# Patient Record
Sex: FEMALE | Race: ASIAN | NOT HISPANIC OR LATINO | Employment: UNEMPLOYED | URBAN - METROPOLITAN AREA
[De-identification: names, ages, dates, MRNs, and addresses within clinical notes are randomized per-mention and may not be internally consistent; named-entity substitution may affect disease eponyms.]

---

## 2023-04-08 ENCOUNTER — HOSPITAL ENCOUNTER (INPATIENT)
Facility: CLINIC | Age: 53
LOS: 6 days | Discharge: NURSING, REHAB OR LONG-TERM CARE | End: 2023-04-14
Attending: PSYCHIATRY & NEUROLOGY | Admitting: PSYCHIATRY & NEUROLOGY
Payer: COMMERCIAL

## 2023-04-08 ENCOUNTER — APPOINTMENT (OUTPATIENT)
Dept: CT IMAGING | Facility: HOSPITAL | Age: 53
End: 2023-04-08
Attending: EMERGENCY MEDICINE
Payer: COMMERCIAL

## 2023-04-08 ENCOUNTER — APPOINTMENT (OUTPATIENT)
Dept: CT IMAGING | Facility: CLINIC | Age: 53
End: 2023-04-08
Attending: PSYCHIATRY & NEUROLOGY
Payer: COMMERCIAL

## 2023-04-08 ENCOUNTER — APPOINTMENT (OUTPATIENT)
Dept: SPEECH THERAPY | Facility: CLINIC | Age: 53
End: 2023-04-08
Attending: STUDENT IN AN ORGANIZED HEALTH CARE EDUCATION/TRAINING PROGRAM
Payer: COMMERCIAL

## 2023-04-08 ENCOUNTER — HOSPITAL ENCOUNTER (EMERGENCY)
Facility: HOSPITAL | Age: 53
Discharge: ANOTHER HOSPITAL | End: 2023-04-08
Attending: EMERGENCY MEDICINE | Admitting: EMERGENCY MEDICINE
Payer: COMMERCIAL

## 2023-04-08 ENCOUNTER — APPOINTMENT (OUTPATIENT)
Dept: CT IMAGING | Facility: CLINIC | Age: 53
End: 2023-04-08
Attending: NURSE PRACTITIONER
Payer: COMMERCIAL

## 2023-04-08 ENCOUNTER — APPOINTMENT (OUTPATIENT)
Dept: OCCUPATIONAL THERAPY | Facility: CLINIC | Age: 53
End: 2023-04-08
Attending: STUDENT IN AN ORGANIZED HEALTH CARE EDUCATION/TRAINING PROGRAM
Payer: COMMERCIAL

## 2023-04-08 VITALS
DIASTOLIC BLOOD PRESSURE: 73 MMHG | BODY MASS INDEX: 22.04 KG/M2 | TEMPERATURE: 98.6 F | HEIGHT: 58 IN | SYSTOLIC BLOOD PRESSURE: 128 MMHG | RESPIRATION RATE: 19 BRPM | WEIGHT: 105 LBS | HEART RATE: 124 BPM | OXYGEN SATURATION: 97 %

## 2023-04-08 DIAGNOSIS — I10 HYPERTENSION, UNSPECIFIED TYPE: ICD-10-CM

## 2023-04-08 DIAGNOSIS — I62.9 INTRACRANIAL HEMORRHAGE (H): ICD-10-CM

## 2023-04-08 DIAGNOSIS — I61.9 HYPERTENSIVE INTRACEREBRAL HEMORRHAGE (H): ICD-10-CM

## 2023-04-08 DIAGNOSIS — I61.0 NONTRAUMATIC SUBCORTICAL HEMORRHAGE OF LEFT CEREBRAL HEMISPHERE (H): Primary | ICD-10-CM

## 2023-04-08 DIAGNOSIS — R53.1 ACUTE RIGHT-SIDED WEAKNESS: ICD-10-CM

## 2023-04-08 DIAGNOSIS — E78.5 HYPERLIPIDEMIA, UNSPECIFIED HYPERLIPIDEMIA TYPE: ICD-10-CM

## 2023-04-08 DIAGNOSIS — E87.6 HYPOKALEMIA: ICD-10-CM

## 2023-04-08 LAB
ABO/RH(D): NORMAL
ANION GAP SERPL CALCULATED.3IONS-SCNC: 12 MMOL/L (ref 7–15)
ANION GAP SERPL CALCULATED.3IONS-SCNC: 15 MMOL/L (ref 7–15)
ANTIBODY SCREEN: NEGATIVE
APTT PPP: 29 SECONDS (ref 22–38)
ATRIAL RATE - MUSE: 83 BPM
BASOPHILS # BLD AUTO: 0 10E3/UL (ref 0–0.2)
BASOPHILS NFR BLD AUTO: 1 %
BUN SERPL-MCNC: 4.6 MG/DL (ref 6–20)
BUN SERPL-MCNC: 6.6 MG/DL (ref 6–20)
CALCIUM SERPL-MCNC: 9.3 MG/DL (ref 8.6–10)
CALCIUM SERPL-MCNC: 9.4 MG/DL (ref 8.6–10)
CHLORIDE SERPL-SCNC: 103 MMOL/L (ref 98–107)
CHLORIDE SERPL-SCNC: 105 MMOL/L (ref 98–107)
CREAT SERPL-MCNC: 0.58 MG/DL (ref 0.51–0.95)
CREAT SERPL-MCNC: 0.68 MG/DL (ref 0.51–0.95)
DEPRECATED HCO3 PLAS-SCNC: 20 MMOL/L (ref 22–29)
DEPRECATED HCO3 PLAS-SCNC: 26 MMOL/L (ref 22–29)
DIASTOLIC BLOOD PRESSURE - MUSE: 118 MMHG
EOSINOPHIL # BLD AUTO: 0.2 10E3/UL (ref 0–0.7)
EOSINOPHIL NFR BLD AUTO: 4 %
ERYTHROCYTE [DISTWIDTH] IN BLOOD BY AUTOMATED COUNT: 16 % (ref 10–15)
GFR SERPL CREATININE-BSD FRML MDRD: >90 ML/MIN/1.73M2
GFR SERPL CREATININE-BSD FRML MDRD: >90 ML/MIN/1.73M2
GLUCOSE BLDC GLUCOMTR-MCNC: 109 MG/DL (ref 70–99)
GLUCOSE BLDC GLUCOMTR-MCNC: 115 MG/DL (ref 70–99)
GLUCOSE BLDC GLUCOMTR-MCNC: 116 MG/DL (ref 70–99)
GLUCOSE BLDC GLUCOMTR-MCNC: 155 MG/DL (ref 70–99)
GLUCOSE SERPL-MCNC: 111 MG/DL (ref 70–99)
GLUCOSE SERPL-MCNC: 213 MG/DL (ref 70–99)
HBA1C MFR BLD: 6.2 %
HCT VFR BLD AUTO: 36.9 % (ref 35–47)
HGB BLD-MCNC: 11.5 G/DL (ref 11.7–15.7)
IMM GRANULOCYTES # BLD: 0 10E3/UL
IMM GRANULOCYTES NFR BLD: 0 %
INR PPP: 0.99 (ref 0.85–1.15)
INTERPRETATION ECG - MUSE: NORMAL
LDLC SERPL DIRECT ASSAY-MCNC: 142 MG/DL
LYMPHOCYTES # BLD AUTO: 2.5 10E3/UL (ref 0.8–5.3)
LYMPHOCYTES NFR BLD AUTO: 37 %
MAGNESIUM SERPL-MCNC: 1.7 MG/DL (ref 1.7–2.3)
MCH RBC QN AUTO: 25.5 PG (ref 26.5–33)
MCHC RBC AUTO-ENTMCNC: 31.2 G/DL (ref 31.5–36.5)
MCV RBC AUTO: 82 FL (ref 78–100)
MONOCYTES # BLD AUTO: 0.6 10E3/UL (ref 0–1.3)
MONOCYTES NFR BLD AUTO: 8 %
NEUTROPHILS # BLD AUTO: 3.4 10E3/UL (ref 1.6–8.3)
NEUTROPHILS NFR BLD AUTO: 50 %
NRBC # BLD AUTO: 0 10E3/UL
NRBC BLD AUTO-RTO: 0 /100
P AXIS - MUSE: 44 DEGREES
PHOSPHATE SERPL-MCNC: 3.2 MG/DL (ref 2.5–4.5)
PLATELET # BLD AUTO: 287 10E3/UL (ref 150–450)
POTASSIUM SERPL-SCNC: 2.6 MMOL/L (ref 3.4–5.3)
POTASSIUM SERPL-SCNC: 2.7 MMOL/L (ref 3.4–5.3)
POTASSIUM SERPL-SCNC: 3.3 MMOL/L (ref 3.4–5.3)
PR INTERVAL - MUSE: 162 MS
QRS DURATION - MUSE: 84 MS
QT - MUSE: 390 MS
QTC - MUSE: 458 MS
R AXIS - MUSE: 23 DEGREES
RADIOLOGIST FLAGS: ABNORMAL
RBC # BLD AUTO: 4.51 10E6/UL (ref 3.8–5.2)
SODIUM SERPL-SCNC: 140 MMOL/L (ref 136–145)
SODIUM SERPL-SCNC: 141 MMOL/L (ref 136–145)
SPECIMEN EXPIRATION DATE: NORMAL
SYSTOLIC BLOOD PRESSURE - MUSE: 234 MMHG
T AXIS - MUSE: -87 DEGREES
TROPONIN T SERPL HS-MCNC: 10 NG/L
TROPONIN T SERPL HS-MCNC: 32 NG/L
TROPONIN T SERPL HS-MCNC: 40 NG/L
TROPONIN T SERPL HS-MCNC: 45 NG/L
TROPONIN T SERPL HS-MCNC: 50 NG/L
TSH SERPL DL<=0.005 MIU/L-ACNC: 3.45 UIU/ML (ref 0.3–4.2)
VENTRICULAR RATE- MUSE: 83 BPM
WBC # BLD AUTO: 6.7 10E3/UL (ref 4–11)

## 2023-04-08 PROCEDURE — 250N000011 HC RX IP 250 OP 636: Performed by: PSYCHIATRY & NEUROLOGY

## 2023-04-08 PROCEDURE — 99292 CRITICAL CARE ADDL 30 MIN: CPT | Performed by: PSYCHIATRY & NEUROLOGY

## 2023-04-08 PROCEDURE — 93005 ELECTROCARDIOGRAM TRACING: CPT | Performed by: EMERGENCY MEDICINE

## 2023-04-08 PROCEDURE — 80048 BASIC METABOLIC PNL TOTAL CA: CPT | Performed by: NURSE PRACTITIONER

## 2023-04-08 PROCEDURE — 97535 SELF CARE MNGMENT TRAINING: CPT | Mod: GO

## 2023-04-08 PROCEDURE — 92526 ORAL FUNCTION THERAPY: CPT | Mod: GN

## 2023-04-08 PROCEDURE — 85730 THROMBOPLASTIN TIME PARTIAL: CPT | Performed by: EMERGENCY MEDICINE

## 2023-04-08 PROCEDURE — 36415 COLL VENOUS BLD VENIPUNCTURE: CPT | Performed by: NURSE PRACTITIONER

## 2023-04-08 PROCEDURE — 36415 COLL VENOUS BLD VENIPUNCTURE: CPT | Performed by: EMERGENCY MEDICINE

## 2023-04-08 PROCEDURE — 84100 ASSAY OF PHOSPHORUS: CPT | Performed by: NURSE PRACTITIONER

## 2023-04-08 PROCEDURE — 84132 ASSAY OF SERUM POTASSIUM: CPT | Performed by: PSYCHIATRY & NEUROLOGY

## 2023-04-08 PROCEDURE — 97530 THERAPEUTIC ACTIVITIES: CPT | Mod: GO

## 2023-04-08 PROCEDURE — 82962 GLUCOSE BLOOD TEST: CPT

## 2023-04-08 PROCEDURE — 85610 PROTHROMBIN TIME: CPT | Performed by: EMERGENCY MEDICINE

## 2023-04-08 PROCEDURE — 93005 ELECTROCARDIOGRAM TRACING: CPT

## 2023-04-08 PROCEDURE — 83036 HEMOGLOBIN GLYCOSYLATED A1C: CPT | Performed by: NURSE PRACTITIONER

## 2023-04-08 PROCEDURE — 85025 COMPLETE CBC W/AUTO DIFF WBC: CPT | Performed by: EMERGENCY MEDICINE

## 2023-04-08 PROCEDURE — 96366 THER/PROPH/DIAG IV INF ADDON: CPT

## 2023-04-08 PROCEDURE — 250N000013 HC RX MED GY IP 250 OP 250 PS 637: Performed by: NURSE PRACTITIONER

## 2023-04-08 PROCEDURE — 70498 CT ANGIOGRAPHY NECK: CPT

## 2023-04-08 PROCEDURE — 250N000011 HC RX IP 250 OP 636: Performed by: NURSE PRACTITIONER

## 2023-04-08 PROCEDURE — 84484 ASSAY OF TROPONIN QUANT: CPT | Performed by: NURSE PRACTITIONER

## 2023-04-08 PROCEDURE — 83735 ASSAY OF MAGNESIUM: CPT | Performed by: NURSE PRACTITIONER

## 2023-04-08 PROCEDURE — 70496 CT ANGIOGRAPHY HEAD: CPT

## 2023-04-08 PROCEDURE — 250N000011 HC RX IP 250 OP 636: Performed by: EMERGENCY MEDICINE

## 2023-04-08 PROCEDURE — 258N000003 HC RX IP 258 OP 636: Performed by: STUDENT IN AN ORGANIZED HEALTH CARE EDUCATION/TRAINING PROGRAM

## 2023-04-08 PROCEDURE — 84443 ASSAY THYROID STIM HORMONE: CPT | Performed by: NURSE PRACTITIONER

## 2023-04-08 PROCEDURE — 70450 CT HEAD/BRAIN W/O DYE: CPT

## 2023-04-08 PROCEDURE — 250N000013 HC RX MED GY IP 250 OP 250 PS 637: Performed by: PSYCHIATRY & NEUROLOGY

## 2023-04-08 PROCEDURE — 200N000002 HC R&B ICU UMMC

## 2023-04-08 PROCEDURE — 80048 BASIC METABOLIC PNL TOTAL CA: CPT | Performed by: EMERGENCY MEDICINE

## 2023-04-08 PROCEDURE — 99291 CRITICAL CARE FIRST HOUR: CPT | Mod: 25

## 2023-04-08 PROCEDURE — 70450 CT HEAD/BRAIN W/O DYE: CPT | Mod: 26 | Performed by: RADIOLOGY

## 2023-04-08 PROCEDURE — 96365 THER/PROPH/DIAG IV INF INIT: CPT | Mod: 59

## 2023-04-08 PROCEDURE — 84484 ASSAY OF TROPONIN QUANT: CPT | Performed by: EMERGENCY MEDICINE

## 2023-04-08 PROCEDURE — 99291 CRITICAL CARE FIRST HOUR: CPT | Mod: FS | Performed by: NURSE PRACTITIONER

## 2023-04-08 PROCEDURE — 92610 EVALUATE SWALLOWING FUNCTION: CPT | Mod: GN

## 2023-04-08 PROCEDURE — 83721 ASSAY OF BLOOD LIPOPROTEIN: CPT | Performed by: NURSE PRACTITIONER

## 2023-04-08 PROCEDURE — 97167 OT EVAL HIGH COMPLEX 60 MIN: CPT | Mod: GO

## 2023-04-08 PROCEDURE — 86901 BLOOD TYPING SEROLOGIC RH(D): CPT | Performed by: EMERGENCY MEDICINE

## 2023-04-08 PROCEDURE — 36415 COLL VENOUS BLD VENIPUNCTURE: CPT | Performed by: PSYCHIATRY & NEUROLOGY

## 2023-04-08 PROCEDURE — 93010 ELECTROCARDIOGRAM REPORT: CPT | Mod: 59 | Performed by: INTERNAL MEDICINE

## 2023-04-08 PROCEDURE — 99292 CRITICAL CARE ADDL 30 MIN: CPT

## 2023-04-08 PROCEDURE — 70450 CT HEAD/BRAIN W/O DYE: CPT | Mod: 77

## 2023-04-08 RX ORDER — AMLODIPINE AND BENAZEPRIL HYDROCHLORIDE 10; 20 MG/1; MG/1
1 CAPSULE ORAL
Status: ON HOLD | COMMUNITY
End: 2023-04-13

## 2023-04-08 RX ORDER — ONDANSETRON 2 MG/ML
4 INJECTION INTRAMUSCULAR; INTRAVENOUS EVERY 6 HOURS PRN
Status: DISCONTINUED | OUTPATIENT
Start: 2023-04-08 | End: 2023-04-14 | Stop reason: HOSPADM

## 2023-04-08 RX ORDER — SODIUM CHLORIDE 9 MG/ML
INJECTION, SOLUTION INTRAVENOUS CONTINUOUS
Status: DISCONTINUED | OUTPATIENT
Start: 2023-04-08 | End: 2023-04-09

## 2023-04-08 RX ORDER — IOPAMIDOL 755 MG/ML
75 INJECTION, SOLUTION INTRAVASCULAR ONCE
Status: COMPLETED | OUTPATIENT
Start: 2023-04-08 | End: 2023-04-08

## 2023-04-08 RX ORDER — AMOXICILLIN 250 MG
2 CAPSULE ORAL EVERY EVENING
Status: DISCONTINUED | OUTPATIENT
Start: 2023-04-08 | End: 2023-04-14 | Stop reason: HOSPADM

## 2023-04-08 RX ORDER — POTASSIUM CHLORIDE 7.45 MG/ML
10 INJECTION INTRAVENOUS
Status: COMPLETED | OUTPATIENT
Start: 2023-04-08 | End: 2023-04-08

## 2023-04-08 RX ORDER — POTASSIUM CHLORIDE 7.45 MG/ML
10 INJECTION INTRAVENOUS
Status: COMPLETED | OUTPATIENT
Start: 2023-04-08 | End: 2023-04-09

## 2023-04-08 RX ORDER — NICOTINE POLACRILEX 4 MG
15-30 LOZENGE BUCCAL
Status: DISCONTINUED | OUTPATIENT
Start: 2023-04-08 | End: 2023-04-14 | Stop reason: HOSPADM

## 2023-04-08 RX ORDER — ACETAMINOPHEN 650 MG/1
650 SUPPOSITORY RECTAL EVERY 4 HOURS PRN
Status: DISCONTINUED | OUTPATIENT
Start: 2023-04-08 | End: 2023-04-14 | Stop reason: HOSPADM

## 2023-04-08 RX ORDER — POLYETHYLENE GLYCOL 3350 17 G/17G
17 POWDER, FOR SOLUTION ORAL DAILY
Status: DISCONTINUED | OUTPATIENT
Start: 2023-04-08 | End: 2023-04-14 | Stop reason: HOSPADM

## 2023-04-08 RX ORDER — HYDRALAZINE HYDROCHLORIDE 20 MG/ML
10 INJECTION INTRAMUSCULAR; INTRAVENOUS EVERY 4 HOURS PRN
Status: DISCONTINUED | OUTPATIENT
Start: 2023-04-08 | End: 2023-04-14 | Stop reason: HOSPADM

## 2023-04-08 RX ORDER — ACETAMINOPHEN 325 MG/1
650 TABLET ORAL EVERY 4 HOURS PRN
Status: DISCONTINUED | OUTPATIENT
Start: 2023-04-08 | End: 2023-04-14 | Stop reason: HOSPADM

## 2023-04-08 RX ORDER — LABETALOL HYDROCHLORIDE 5 MG/ML
10 INJECTION, SOLUTION INTRAVENOUS
Status: DISCONTINUED | OUTPATIENT
Start: 2023-04-08 | End: 2023-04-14 | Stop reason: HOSPADM

## 2023-04-08 RX ORDER — POLYETHYLENE GLYCOL 3350 17 G/17G
17 POWDER, FOR SOLUTION ORAL DAILY
Status: DISCONTINUED | OUTPATIENT
Start: 2023-04-08 | End: 2023-04-08

## 2023-04-08 RX ORDER — POTASSIUM CHLORIDE 7.45 MG/ML
10 INJECTION INTRAVENOUS ONCE
Status: COMPLETED | OUTPATIENT
Start: 2023-04-08 | End: 2023-04-08

## 2023-04-08 RX ORDER — POTASSIUM CHLORIDE 1.5 G/1.58G
40 POWDER, FOR SOLUTION ORAL ONCE
Status: COMPLETED | OUTPATIENT
Start: 2023-04-08 | End: 2023-04-08

## 2023-04-08 RX ORDER — DEXTROSE MONOHYDRATE 25 G/50ML
25-50 INJECTION, SOLUTION INTRAVENOUS
Status: DISCONTINUED | OUTPATIENT
Start: 2023-04-08 | End: 2023-04-14 | Stop reason: HOSPADM

## 2023-04-08 RX ORDER — ACETAMINOPHEN 325 MG/10.15ML
650 LIQUID ORAL EVERY 4 HOURS PRN
Status: DISCONTINUED | OUTPATIENT
Start: 2023-04-08 | End: 2023-04-08

## 2023-04-08 RX ORDER — AMOXICILLIN 250 MG
2 CAPSULE ORAL AT BEDTIME
Status: DISCONTINUED | OUTPATIENT
Start: 2023-04-08 | End: 2023-04-08

## 2023-04-08 RX ADMIN — POTASSIUM CHLORIDE 10 MEQ: 7.46 INJECTION, SOLUTION INTRAVENOUS at 01:30

## 2023-04-08 RX ADMIN — POTASSIUM CHLORIDE 40 MEQ: 1.5 POWDER, FOR SOLUTION ORAL at 12:54

## 2023-04-08 RX ADMIN — NICARDIPINE HYDROCHLORIDE 2.5 MG/HR: 0.2 INJECTION, SOLUTION INTRAVENOUS at 00:44

## 2023-04-08 RX ADMIN — POTASSIUM CHLORIDE 10 MEQ: 7.46 INJECTION, SOLUTION INTRAVENOUS at 16:05

## 2023-04-08 RX ADMIN — NICARDIPINE HYDROCHLORIDE 7.5 MG/HR: 0.2 INJECTION, SOLUTION INTRAVENOUS at 05:29

## 2023-04-08 RX ADMIN — IOPAMIDOL 75 ML: 755 INJECTION, SOLUTION INTRAVENOUS at 00:34

## 2023-04-08 RX ADMIN — POTASSIUM CHLORIDE 10 MEQ: 7.46 INJECTION, SOLUTION INTRAVENOUS at 12:54

## 2023-04-08 RX ADMIN — POTASSIUM CHLORIDE 10 MEQ: 7.46 INJECTION, SOLUTION INTRAVENOUS at 10:56

## 2023-04-08 RX ADMIN — POTASSIUM CHLORIDE 10 MEQ: 7.46 INJECTION, SOLUTION INTRAVENOUS at 23:11

## 2023-04-08 RX ADMIN — POTASSIUM CHLORIDE 10 MEQ: 7.46 INJECTION, SOLUTION INTRAVENOUS at 15:23

## 2023-04-08 RX ADMIN — SODIUM CHLORIDE: 9 INJECTION, SOLUTION INTRAVENOUS at 07:04

## 2023-04-08 RX ADMIN — LABETALOL HYDROCHLORIDE 10 MG: 5 INJECTION, SOLUTION INTRAVENOUS at 23:09

## 2023-04-08 RX ADMIN — POLYETHYLENE GLYCOL 3350 17 G: 17 POWDER, FOR SOLUTION ORAL at 12:54

## 2023-04-08 RX ADMIN — POTASSIUM CHLORIDE 10 MEQ: 7.46 INJECTION, SOLUTION INTRAVENOUS at 14:05

## 2023-04-08 RX ADMIN — POTASSIUM CHLORIDE 10 MEQ: 7.46 INJECTION, SOLUTION INTRAVENOUS at 22:35

## 2023-04-08 RX ADMIN — POTASSIUM CHLORIDE 10 MEQ: 7.46 INJECTION, SOLUTION INTRAVENOUS at 12:05

## 2023-04-08 ASSESSMENT — ACTIVITIES OF DAILY LIVING (ADL)
ADLS_ACUITY_SCORE: 41
ADLS_ACUITY_SCORE: 35
ADLS_ACUITY_SCORE: 41
ADLS_ACUITY_SCORE: 35
ADLS_ACUITY_SCORE: 41
ADLS_ACUITY_SCORE: 35
PREVIOUS_RESPONSIBILITIES: MEAL PREP;HOUSEKEEPING;LAUNDRY;SHOPPING;MEDICATION MANAGEMENT;DRIVING
ADLS_ACUITY_SCORE: 35
ADLS_ACUITY_SCORE: 41
ADLS_ACUITY_SCORE: 35
ADLS_ACUITY_SCORE: 35

## 2023-04-08 ASSESSMENT — VISUAL ACUITY
OU: NORMAL ACUITY

## 2023-04-08 ASSESSMENT — ENCOUNTER SYMPTOMS
FACIAL ASYMMETRY: 1
WEAKNESS: 1

## 2023-04-08 NOTE — LETTER
[facility] [address]  [address] [facility] [address]  Phone: [phone]    April 13, 2023        Name of Family Member:          To whom it may concern:    Please excuse ______________ from their professional duties from the dates of ____________, as they were an integral part in aiding in their family member's care whilst hospitalized.  They helped coordinate with translation and daily examinations with the patient under my care.       Please contact me for questions or concerns.       Sincerely,          Adryan Reed, DO  Resident Physician  Department of Neurology  [facility], [place]  [phone]

## 2023-04-08 NOTE — H&P
Neurocritical Care History & Physical    Reason for critical care admission: ICH  Admitting Team: TOD  Date of Service:  04/08/2023  Date of Admission:  4/8/2023  Hospital Day: 1    Assessment/Plan  Ms. Mercy Osmna is a 52 year old woman with a past medical history of HTN admitted on 04/08/23 for spontaneous ICH, likely hypertensive in etiology. She presented to [facility] after she fell due to sudden onset of right-sided weakness.      She was deemed suitable for transfer to [facility] for further management.     Neuro  #Spontaneous left thalamic intracranial hemorrhage, likely secondary to uncontrolled hypertension   #Vasogenic cerebral edema secondary to ICH  - ICH Score at admission: 0  - Hold all antiplatelet and anticoagulant medications  - Repeat head CT now   - Admit to Neuro ICU  - Neurochecks per protocol  - Systolic BP Goal: < 160  - Labetalol and hydralazine PRN available   - Avoid hypotonic IV fluids  - Head of bed elevated  - PT/OT/SLP when appropriate      #Analgesics & sedation  - acetaminophen prn     CV  #HTN  - Cardiac monitoring  - SBP goal < 160 mmHg   - Labetalol and hydralazine available   - Hold PTA amlodipine-benazepril  - EKG, serial troponin  - Echocardiogram      Resp  #DELON  Oxygen/vent: none  - Continuous pulse ox  - Maintain O2 saturations greater than 92%     GI  #DELON  Diet: pending swallow screen, NPO  GI prophylaxis: not indicated  - Bowel regimen: scheduled senna-docusate and Miralax     Renal/  #Hypokalemia  - Daily BMP  - IV fluids: NS 75cc/hr  - Electrolyte replacement protocol     Endo  #Pre-diabetes   - Hgb A1c 6.2% 4/8/2023   - TSH pending   - Monitor glucose levels     Heme  #Anemia, chronic   - Daily CBC  - Hgb goal >7, plt goal >50k  - Transfuse to meet Hgb and plt goals     ID  #DELON  - Daily CBC  - Follow temperature curve     ICU Check List  Lines/tubes/drains: PIV  FEN: NS 75 ml/hour, electrolyte protocols, NPO for now   PPX: DVT - SCDs (pharm contraindicated due to  bleed); GI - not indicated .  Code: Full Code   Dispo: ICU - NCC    TIME SPENT ON THIS ENCOUNTER  I spent 70 minutes of critical care time on the unit/floor managing the care of Mercy Osman excluding time performing procedures. Upon evaluation, this patient had a high probability of imminent or life-threatening deterioration due to ICH, which required my direct attention, intervention, and personal management. Greater than 50% of my time was spent at the bedside counseling the patient and/or coordinating care including chart review, history, exam, documentation, and further activities per this note. I have personally reviewed the following data/imaging over the past 24 hours.     The patient was seen and discussed with the NCC attending, Dr. Javid Silva.    Jaelyn Ingram, APRN, CNP  Neurocritical Care *[id number]    History of Present Illness:  Ms. Mercy Osman is a 52 year old woman with a past medical history of HTN (on amlodipine-benazepril 10-20), history of prior Bell's Palsy admitted on 04/08/23 as a transfer from [facility] for intracranial hemorrhage.     Per chart review she presented 4/7/23 to [facility] ED via EMS after noted right sided weakness and facial droop. Last known well was 4/7 at 23:47. Per EHR review Ms. Osman was walking downstairs and fell due to sudden onset of right arm and leg weakness. She did not have obvious head trauma and she is not currently on anticoagulation. Stroke code activated upon arrival (see fellow note for full details). She was found to have a left basal ganglia hemorrhage. BP on arrival  To /115. Noted to be hypokalemic to 2.6, repleted. Started on a nicardipine gtt.     She was subsequently transferred to [facility].    No Known Allergies    Current Medications:    polyethylene glycol  17 g Oral or Feeding Tube Daily     senna-docusate  2 tablet Oral or Feeding Tube QPM       PRN Medications:  acetaminophen **OR** [DISCONTINUED] acetaminophen **OR**  acetaminophen, hydrALAZINE, labetalol, ondansetron, prochlorperazine    Infusions:    sodium chloride 75 mL/hr at 04/08/23 0704       Physical Examination:  Vitals: /86   Pulse 111   Temp 97.5  F (36.4  C) (Oral)   Resp 23   Wt 52.9 kg (116 lb 10 oz)   SpO2 95%   BMI 24.37 kg/m    General: Adult female patient, lying in bed, critically-ill.  HEENT: Normocephalic, atraumatic.  Cardiac: RRR, no obvious murmur. She appears adequately perfused.   Pulm: She is not hypoxic, no tachypnea.  Abdomen: Soft, non-distended.  Extremities: Warm, no edema, distal extremities appear adequately perfused.   Skin: No obvious rash or lesion on exposed skin.   Psych: Calm and cooperative.  Neuro: Exam is limited by language- family is present and willing to serve as .   Mental status: Awake, alert, attentive, speech not always understandable per family. Follows commands when commands are asked by family in native language.   Cranial nerves: PERRL, conjugate gaze, EOMI, right lower facial droop, unable to shrug right shoulder.    Motor: Normal bulk and tone. No abnormal movements. Right upper extremity no movement, right lower extremity weak but does not hit bed.   Sensory: Appears to be intact.   Coordination: Deferred.    Gait: Deferred.    ICH Score (at arrival)  Scoring Tool Score   Age ? 80 years 1 point No   GCS score  3-4   5-12   13-15   2 point  1 point  0 point GCS 13-15   Hematoma volume, cm 3 ? 30 1 point No   Intraventricular extension 1 point No   Infratentorial location 1 point No   Total 0     Labs and Imaging:    Results for orders placed or performed during the hospital encounter of 04/08/23 (from the past 24 hour(s))   EKG 12-lead   Result Value Ref Range    Systolic Blood Pressure  mmHg    Diastolic Blood Pressure  mmHg    Ventricular Rate 113 BPM    Atrial Rate 113 BPM    SD Interval 142 ms    QRS Duration 88 ms     ms    QTc 496 ms    P Axis 17 degrees    R AXIS -4 degrees    T Axis -50  degrees    Interpretation ECG       Sinus tachycardia  Left ventricular hypertrophy with repolarization abnormality ( R in aVL , Sokolow-Jackson , [place] product )  Abnormal ECG  When compared with ECG of 08-APR-2023 00:35, (unconfirmed)  Nonspecific T wave abnormality, improved in Anterolateral leads     EKG 12-lead, complete   Result Value Ref Range    Systolic Blood Pressure  mmHg    Diastolic Blood Pressure  mmHg    Ventricular Rate 83 BPM    Atrial Rate 83 BPM    NV Interval 168 ms    QRS Duration 90 ms     ms    QTc 526 ms    P Axis 44 degrees    R AXIS 3 degrees    T Axis 105 degrees    Interpretation ECG       Sinus rhythm with sinus arrhythmia  Voltage criteria for left ventricular hypertrophy ( R in aVL , Sokolow-Jackson , [place] product )  ST & T wave abnormality, consider lateral ischemia  Prolonged QT  Abnormal ECG  When compared with ECG of 08-APR-2023 07:01, (unconfirmed)  Nonspecific T wave abnormality has replaced inverted T waves in Inferior leads     Hemoglobin A1c   Result Value Ref Range    Hemoglobin A1C 6.2 (H) <5.7 %       All relevant imaging and laboratory values personally reviewed.

## 2023-04-08 NOTE — PROGRESS NOTES
Neurosurgery Treatment Plan:   Reviewed patients chart  Right sided weakness and slurred speech that began a hour ago   Blood pressure on arrival 227/115 not on blood thinners   Stroke neurology called      Images:   IMPRESSION:   HEAD CT:  1. 2.7 x 1.4 x 2.5 cm acute hemorrhage in the left thalamus/left internal capsule with involvement of the left basal ganglia. No intraventricular extension. This is typical of a hypertensive bleed.  2. Slight left to right bowing of the midline structures.      HEAD CTA: No stenosis/occlusion, aneurysm, or high flow vascular malformation.     NECK CTA:  No measurable stenosis or dissection.     Plan:   Needs emergent transfer to [facility] to neuro ICU pending bed availability if no beds will need to transfer out of system unable to stay at [facility]   No neurosurgical intervention presently indicated  Systolic BP <150   HOB> 30 degrees  Hold all anticoagulants   Repeat head CT in 6 hours      Shae Larson PA-C  [facility] Neurosurgery  O: [phone]

## 2023-04-08 NOTE — PLAN OF CARE
Major Shift Events:  Q2 neuros. RUE only withdraws to pain, no spontaneous movement. RLE will have whole leg movement but limited plantar and dorsal flexion. LUE and LLE follow commands and have +5 strength. Was able to stand and pivot to chair with heavy assist of 2. AxO X4 via family as interpretors. MRI completed.  Cleared for puree diet and thin liquids by speech.  Large amounts of urine, 2 Ls measured and full briefs of unmeasured urine. 6 bags of 10 meq potassium given in replacement along with 40 meqs orally.   Plan: Continue neuros, track bleed  For vital signs and complete assessments, please see documentation flowsheets.

## 2023-04-08 NOTE — PLAN OF CARE
Admitted/transferred from: [facility] ED   Reason for admission/transfer: NC consult   2 RN skin assessment: completed by LILY Sesay and LILY Ayala  Result of skin assessment and interventions/actions: No intervention required   Height, weight, drug calc weight: Done   Patient belongings: None, came with son   MDRO education added to care plan: N/A  ?

## 2023-04-08 NOTE — CONSULTS
[facility]    Stroke Telephone Note    I was called by Zen Wills on 04/08/23 regarding patient Mercy Osman. The patient is a 52 year old female who presents with   PMhx of HTN went to the bathroom and came back down the stairs and had right sided weakness and face droop. She fell against the wall without any head trauma.    LKN: 2347  Stroke code was paged out when patient arrived 30min after symptom onset, stroke code was received at 0042    Stroke Code Data (for stroke code without tele)  Stroke code activated 04/08/23   0042   Stroke provider first response  04/08/23   0044            Last known normal 04/08/23   2347        Time of discovery   (or onset of symptoms) 04/08/23   2347   Head CT read by Stroke Neuro Dr/Provider 04/08/23   0048   Was stroke code de-escalated? No              Imaging Findings   IMPRESSION:   HEAD CT:     1. 2.7 x 1.4 x 2.5 cm acute hemorrhage in the left thalamus/left internal capsule with involvement of the left basal ganglia. No intraventricular extension. This is typical of a hypertensive bleed.  2. Slight left to right bowing of the midline structures.      HEAD CTA: No stenosis/occlusion, aneurysm, or high flow vascular malformation.     NECK CTA:  No measurable stenosis or dissection.       Intravenous Thrombolysis  Not given due to:   - active bleeding    Endovascular Treatment  Not initiated due to absence of proximal vessel occlusion    Impression  Non-traumatic intracerebral hemorrhage of Left thalamus/ IC- who presents with right sided weakness that developed suddenly. She presents with BP in the 230s/118s consistent with a hypertensive etiology of her bleed. CTA showed no evidence of aneurysm or vascular malformation.     Recommendations   Acute Hemorrhagic Stroke Recommendations  - Neurochecks and Vital Signs every Q1H  - Systolic BP Goal: 140-160  - Don't drop BP by 20% in the first hour  - Labs coags: PT/INR  - Head of bed  "elevated  - Telemetry, EKG  - Imaging: repeat head CT in 6 hours  - Bedside Glucose Monitoring  - A1c, Troponin x 3  - PT/OT/SLP  - Stroke Education  - Euthermia, Euglycemia  - NSYG consult   - Transfer to [facility] or [facility]  - MRI brain WO    My recommendations are based on the information provided over the phone by Mercy Osman's in-person providers. They are not intended to replace the clinical judgment of her in-person providers. I was not requested to personally see or examine the patient at this time.    The Stroke Staff is Dr. Diaz  .    Belle Buchanan MD  Vascular Neurology Fellow    To page me or covering stroke neurology team member, click here: AMCOM  Choose \"On Call\" tab at top, then select \"NEUROLOGY/ALL SITES\" from middle drop-down box, press Enter, then look for \"stroke\" or \"telestroke\" for your site.          "

## 2023-04-08 NOTE — PROGRESS NOTES
04/08/23 1124   Appointment Info   Signing Clinician's Name / Credentials (SLP) Nadia Forrester MA CCC-SLP   General Information   Onset of Illness/Injury or Date of Surgery 04/08/23   Referring Physician Julisa Barker MD   Patient/Family Therapy Goal Statement (SLP) None stated   Pertinent History of Current Problem Pt is a 52 year old woman with a past medical history of HTN admitted on 04/08/23 for spontaneous ICH, likely hypertensive in etiology. She presented to [facility] after she fell due to sudden onset of right-sided weakness. Clinical swallow eval completed per MD order.   General Observations Upon SLP arrival, pt positioned upright in the chair, alert, and willing to participate. Noted significant communication deficits. Pt's family present.       Present yes  (Family)   Language Hmong   Type of Evaluation   Type of Evaluation Swallow Evaluation   Oral Motor   Oral Musculature anomalies present   Structural Abnormalities none present   Mucosal Quality dry   Dentition (Oral Motor)   Dentition (Oral Motor) some missing teeth   Facial Symmetry (Oral Motor)   Facial Symmetry (Oral Motor) right side impairment   Right Side Facial Asymmetry moderate impairment   Lip Function (Oral Motor)   Lip Range of Motion (Oral Motor) protrusion impairment;retraction impairment   Protrusion, Lip Range of Motion right side;moderate impairment   Retraction, Lip Range of Motion right side;moderate impairment   Tongue Function (Oral Motor)   Tongue ROM (Oral Motor) WNL   Jaw Function (Oral Motor)   Jaw Function (Oral Motor) WNL   Cough/Swallow/Gag Reflex (Oral Motor)   Volitional Throat Clear/Cough (Oral Motor) unable/difficult to assess   Volitional Swallow (Oral Motor) mildly delayed   Vocal Quality/Secretion Management (Oral Motor)   Vocal Quality (Oral Motor) WFL   Secretion Management (Oral Motor) WNL   General Swallowing Observations   Past History of Dysphagia No hx of dysphagia per  chart review and family report.   Respiratory Support (General Swallowing Observations) none   Current Diet/Method of Nutritional Intake (General Swallowing Observations, NIS) NPO   Swallowing Evaluation Clinical swallow evaluation   Clinical Swallow Evaluation   Feeding Assistance frequent cues/help required   Clinical Swallow Evaluation Textures Trialed thin liquids;pureed   Clinical Swallow Eval: Thin Liquid Texture Trial   Mode of Presentation, Thin Liquids cup;spoon;straw;fed by clinician   Volume of Liquid or Food Presented 4 oz   Oral Phase of Swallow WFL   Pharyngeal Phase of Swallow intact   Diagnostic Statement No overt s/sx of aspiration evident.   Clinical Swallow Evaluation: Puree Solid Texture Trial   Mode of Presentation, Puree spoon;fed by clinician   Volume of Puree Presented 2 oz   Oral Phase, Puree WFL   Pharyngeal Phase, Puree intact   Diagnostic Statement No overt s/sx of aspiration evident.   Esophageal Phase of Swallow   Patient reports or presents with symptoms of esophageal dysphagia No   Swallowing Recommendations   Diet Consistency Recommendations thin liquids (level 0);pureed (level 4)   Supervision Level for Intake 1:1 supervision needed   Mode of Delivery Recommendations bolus size, small   Swallowing Maneuver Recommendations alternate food and liquid intake   Monitoring/Assistance Required (Eating/Swallowing) stop eating activities when fatigue is present;monitor for cough or change in vocal quality with intake   Recommended Feeding/Eating Techniques (Swallow Eval) maintain upright sitting position for eating;provide assist with feeding;minimize distractions during oral intake   Medication Administration Recommendations, Swallowing (SLP) Crushed in puree   Instrumental Assessment Recommendations instrumental evaluation not recommended at this time   General Therapy Interventions   Planned Therapy Interventions Dysphagia Treatment   Dysphagia treatment Oropharyngeal exercise  training;Modified diet education;Instruction of safe swallow strategies;Compensatory strategies for swallowing   Clinical Impression   Criteria for Skilled Therapeutic Interventions Met (SLP Eval) Yes, treatment indicated   SLP Diagnosis Mild oropharyngeal dysphagia   Problem List (SLP) Below baseline swallow mechanism   Risks & Benefits of therapy have been explained evaluation/treatment results reviewed;care plan/treatment goals reviewed;risks/benefits reviewed;current/potential barriers reviewed;participants voiced agreement with care plan;participants included;patient   Clinical Impression Comments Clinical swallow eval completed per MD order. Pt presents w/ mild oropharyngeal dysphagia in setting of ICH. Oral mech exam remarkable for R sided facial and labial weakness and some missing teeth. Mod-severe dysarthria noted. Pt assessed w/ thin liquids via tsp, cup, and straw and puree. Oral phase adequate. Pharyngeal phase unremarkable, w/ no overt s/sx of aspiration. Pt required frequent models, cues, and assiatnce to follow safe swallowing during eval. Recommend puree diet (IDDSI 4) and thin liquids w/ 1:1 supervision. Meds OK crushed in puree. Ensure pt is fully upright and alert, taking small sips/bites at a slow rate. SLP to follow.   SLP Total Evaluation Time   Eval: oral/pharyngeal swallow function, clinical swallow Minutes (46620) 17   SLP Discharge Planning   SLP Plan Diet tolerance/upgrade   SLP Discharge Recommendation Transitional Care Facility;Acute Rehab Center-Motivated patient will benefit from intensive, interdisciplinary therapy.  Anticipate will be able to tolerate 3 hours of therapy per day   SLP Rationale for DC Rec Dysphagia, below baseline communication   SLP Brief overview of current status  Recommend puree diet (IDDSI 4) and thin liquids w/ 1:1 supervision. Meds OK crushed in puree. Ensure pt is fully upright and alert, taking small sips/bites at a slow rate. SLP to follow.   Total Session  Time   Total Session Time (sum of timed and untimed services) 25

## 2023-04-08 NOTE — ED NOTES
Bed: JNED-01  Expected date: 4/8/23  Expected time: 12:09 AM  Means of arrival: Ambulance  Comments:  Lisa thomas

## 2023-04-08 NOTE — ED TRIAGE NOTES
Pt came via EMS and went to CT upon arrival. Pt went to [facility] normally and came out with having stroke symptoms such as right sided weakness, garbled speech according to pt's son. Symptoms occurred 20 minutes before Ems got there. Pt's son at bedside to translate.

## 2023-04-08 NOTE — ED PROVIDER NOTES
NAME: Mercy Osman  AGE: 52 year old female  YOB: 1970  MRN: [id number]  EVALUATION DATE & TIME: No admission date for patient encounter.    PCP: Ara Osman    ED PROVIDER: Zne Wills M.D.    Chief Complaint   Patient presents with     Stroke Symptoms     FINAL IMPRESSION:  1. Hypertensive intracerebral hemorrhage (H)    2. Intracranial hemorrhage (H)    3. Acute right-sided weakness    4. Hypokalemia      MEDICAL DECISION MAKIN:12 AM I met with the patient, obtained history, performed an initial exam, and discussed options and plan for diagnostics and treatment here in the ED. Tier I stroke code called.   12:32 AM Spoke with [facility]. Patient with head bleed.   12:44 AM Spoke with Dr. Belle Buchanan, Stroke Neurology, about patient.   12:52 AM Spoke with Shae Larson PA-C, Neurosurgery, about patient.   1:32 AM Spoke with Dr. Silva, [facility] hospitalist, who agrees to accept patient from admission to neuro critical care.   2:02 AM patient's family updated and recheck of patient with improvement in movement of right upper extremity.    Patient was clinically assessed and consented to treatment. After assessment, medical decision making and workup were discussed with the patient. The patient was agreeable to plan for testing, workup, and treatment.  Pertinent Labs & Imaging studies reviewed. (See chart for details)       Medical Decision Making    History:    Supplemental history from: EMS    External Record(s) reviewed: Documented in chart, if applicable.    Work Up:    Chart documentation includes differential considered and any EKGs or imaging independently interpreted by provider, where specified.    In additional to work up documented, I considered the following work up: Documented in chart, if applicable.    External consultation:    Discussion of management with another provider: Radiology (regarding imaging) and Other: Stroke Neurology, Neuro  Intensivist    Complicating factors:    Care impacted by chronic illness: Hypertension and Other: Bell's Palsy    Care affected by social determinants of health: Other: Language Barrier    Disposition considerations: Admit.    Mercy Osman is a 52 year old female who presents with stroke symptoms.   Differential diagnosis includes but not limited to CVA, TIA, Leonard's paralysis, intracranial hemorrhage, hypertensive emergency.  Patient with right-sided deficit that started 30 minutes prior to arrival.  Patient does have significant drift and weakness on the right side in the upper and lower extremity as well as right facial droop.  Speech is dysarthric.  Stroke code activated on arrival and patient sent straight to CT scan.  Radiology called back and CT scan did show a left thalamic intraparenchymal hemorrhage.  Likely hypertensive related.  Patient blood pressure still in the 200s and radiology will consider CTA but likely hypertensive intracranial hemorrhage.  Nicardipine ordered to help with blood pressure control and I spoke with stroke neurology.  Given the bleed stroke neurology recommended patient be transferred to either [facility] or the [facility] where neurosurgery had intracranial capabilities.  I did also speak with neurosurgery who made similar recommendation for transfer, although they did not feel patient would emergently need any neurosurgical intervention.  They recommended blood pressure control and will try to find ICU bed at either of those facilities for admission.  Blood pressure control still ongoing with nicardipine and adjusting rate to reduce blood pressure below 160.  Blood pressure maintaining below 160 systolically at 7.5, nicardipine.  Neurochecks continuing and patient was accepted for ICU admission at [facility] with Dr. Silva.  We will continue nicardipine for blood pressure control and monitor neuro exam.  On rechecks patient's  neuro exam did improve slightly with some right-sided droop on the right side of her face as well as improvement in some proximal muscle movement of the right upper extremity.  We will continue close neuro exams and monitoring with plan to transfer to [place] as soon as transport is available.    Critical Care     Performed by: Dr Dmitry Wills  Authorized by: Dr Dmitry Wills  Total critical care time: 108 minutes  Critical care was necessary to treat or prevent imminent or life-threatening deterioration of the following conditions:  Right-sided weakness, hypertensive emergency, intracranial hemorrhage.  Critical care was time spent personally by me on the following activities: development of treatment plan with patient or surrogate, discussions with consultants, examination of patient, evaluation of patient's response to treatment, obtaining history from patient or surrogate, ordering and performing treatments and interventions, ordering and review of laboratory studies, ordering and review of radiographic studies, re-evaluation of patient's condition and monitoring for potential decompensation.  Critical care time was exclusive of separately billable procedures and treating other patients.    MEDICATIONS GIVEN IN THE EMERGENCY:  Medications   niCARdipine 40 mg in 200 mL NS (CARDENE) infusion (7.5 mg/hr Intravenous Rate/Dose Verify 4/8/23 0234)   iopamidol (ISOVUE-370) solution 75 mL (75 mLs Intravenous $Given 4/8/23 0034)   potassium chloride 10 mEq in 100 mL sterile water infusion (0 mEq Intravenous Stopped 4/8/23 0234)       NEW PRESCRIPTIONS STARTED AT TODAY'S ER VISIT:  New Prescriptions    No medications on file       =================================================================    HPI    Patient information was obtained from: EMS    Use of : N/A    HPI is limited due to acuity of condition.     Mercy Osman is a 52 year old female with a past medical history of HTN, Bell's Palsy, who presents with  "stroke symptoms. Patient's last known well was 30 minutes ago. Patient presents with weakness in her right arm and leg. Patient was was walking down stairs and fell due to sudden onset of right-sided weakness. She endorses rights-sided facial droop. BG measured to be 292.     REVIEW OF SYSTEMS   Review of Systems   Unable to perform ROS: Acuity of condition   Neurological: Positive for facial asymmetry and weakness.      PAST MEDICAL HISTORY:  History reviewed. No pertinent past medical history.    PAST SURGICAL HISTORY:  History reviewed. No pertinent surgical history.    CURRENT MEDICATIONS:      Current Facility-Administered Medications:      niCARdipine 40 mg in 200 mL NS (CARDENE) infusion, 0.5-15 mg/hr, Intravenous, Continuous, WallZen MD, Last Rate: 37.5 mL/hr at 04/08/23 0234, 7.5 mg/hr at 04/08/23 0234    Current Outpatient Medications:      amLODIPine-benazepril (LOTREL) 10-20 MG capsule, 1 capsule, Disp: , Rfl:     ALLERGIES:  No Known Allergies    FAMILY HISTORY:  History reviewed. No pertinent family history.    SOCIAL HISTORY:   Social History     Socioeconomic History     Marital status:      PHYSICAL EXAM:    Vitals: /84   Pulse (!) 121   Temp 98.6  F (37  C) (Oral)   Resp 29   Ht 1.473 m (4' 10\")   Wt 47.6 kg (105 lb)   SpO2 96%   BMI 21.95 kg/m     Physical Exam  Vitals and nursing note reviewed.   Constitutional:       General: She is in acute distress.      Appearance: Normal appearance. She is normal weight.   HENT:      Head: Atraumatic.   Eyes:      Extraocular Movements: Extraocular movements intact.      Pupils: Pupils are equal, round, and reactive to light.   Cardiovascular:      Rate and Rhythm: Normal rate and regular rhythm.      Heart sounds: Normal heart sounds.   Pulmonary:      Effort: Pulmonary effort is normal. No respiratory distress.      Breath sounds: Normal breath sounds.   Abdominal:      Palpations: Abdomen is soft.   Musculoskeletal: "         General: No signs of injury.   Skin:     General: Skin is warm and dry.      Coloration: Skin is not pale.   Neurological:      Mental Status: She is alert.      GCS: GCS eye subscore is 4. GCS verbal subscore is 5. GCS motor subscore is 6.      Cranial Nerves: Cranial nerve deficit, dysarthria and facial asymmetry present.      Sensory: Sensory deficit present.      Motor: Weakness, abnormal muscle tone and pronator drift present. No tremor.      Comments: Right-sided deficit of the face, upper and lower extremity with no effort against gravity.  Additionally decreased sensation and neglect of those upper and lower extremity.   Psychiatric:         Mood and Affect: Mood normal.      National [employer] Stroke Scale  Exam Interval: Baseline   Score    Level of consciousness: (0)   Alert, keenly responsive    LOC questions: (2)   Answers neither question correctly    LOC commands: (2)   Performs neither task correctly    Best gaze: (1)   Partial gaze palsy    Visual: (0)   No visual loss    Facial palsy: (2)   Partial paralysis (total/near total of lower face)    Motor arm (left): (0)   No drift    Motor arm (right): (4)   No movement    Motor leg (left): (0)   No drift    Motor leg (right): (4)   No movement    Limb ataxia: (0)   Absent    Sensory: (2)   Severe to total sensory loss    Best language: (0)   Normal- no aphasia    Dysarthria: (1)   Mild to moderate dysarthria    Extinction and inattention: (2)   Profound mir-inattention / extinction > one modality        Total Score:  20      LAB:  All pertinent labs reviewed and interpreted.  Labs Ordered and Resulted from Time of ED Arrival to Time of ED Departure   BASIC METABOLIC PANEL - Abnormal       Result Value    Sodium 141      Potassium 2.6 (*)     Chloride 103      Carbon Dioxide (CO2) 26      Anion Gap 12      Urea Nitrogen 6.6      Creatinine 0.68      Calcium 9.3      Glucose 213 (*)     GFR Estimate >90     CBC WITH PLATELETS AND  DIFFERENTIAL - Abnormal    WBC Count 6.7      RBC Count 4.51      Hemoglobin 11.5 (*)     Hematocrit 36.9      MCV 82      MCH 25.5 (*)     MCHC 31.2 (*)     RDW 16.0 (*)     Platelet Count 287      % Neutrophils 50      % Lymphocytes 37      % Monocytes 8      % Eosinophils 4      % Basophils 1      % Immature Granulocytes 0      NRBCs per 100 WBC 0      Absolute Neutrophils 3.4      Absolute Lymphocytes 2.5      Absolute Monocytes 0.6      Absolute Eosinophils 0.2      Absolute Basophils 0.0      Absolute Immature Granulocytes 0.0      Absolute NRBCs 0.0     INR - Normal    INR 0.99     PARTIAL THROMBOPLASTIN TIME - Normal    aPTT 29     TROPONIN T, HIGH SENSITIVITY - Normal    Troponin T, High Sensitivity 10     GLUCOSE MONITOR NURSING POCT   TYPE AND SCREEN, ADULT    ABO/RH(D) A POS      Antibody Screen Negative      SPECIMEN EXPIRATION DATE [id number]     ABO/RH TYPE AND SCREEN     RADIOLOGY:  CTA Head Neck with Contrast   Final Result   Abnormal   IMPRESSION:    HEAD CT:      1. 2.7 x 1.4 x 2.5 cm acute hemorrhage in the left thalamus/left internal capsule with involvement of the left basal ganglia. No intraventricular extension. This is typical of a hypertensive bleed.   2. Slight left to right bowing of the midline structures.       HEAD CTA: No stenosis/occlusion, aneurysm, or high flow vascular malformation.      NECK CTA:   No measurable stenosis or dissection.         [Critical Result: Acute intracranial hemorrhage]      Finding was identified on 4/8/2023 12:36 AM.       Dr. Wills was contacted by me on 4/8/2023 12:34 AM and verbalized understanding of the critical result.            EKG:   Performed at: 00:35  Impression: Sinus rhythm with minimal LVH criteria, no ST elevation or ischemia, no irregular rhythm.  Rate: 83 BPM  Rhythm: Sinus rhythm  QRS Interval: 84 ms  QTc Interval: 390/458 ms  Comparison: No prior EKG for comparison.  I have independently reviewed and interpreted the EKG(s) documented  above.     I, José Jovel, am serving as a scribe to document services personally performed by Dr. Zen Wills  based on my observation and the provider's statements to me. I, Zen Wills MD attest that José Jovel is acting in a scribe capacity, has observed my performance of the services and has documented them in accordance with my direction.    Zen Wills M.D.  Emergency Medicine  [facility] Emergency Department     Zen Wills MD  04/08/23 1666

## 2023-04-08 NOTE — ED NOTES
EMERGENCY DEPARTMENT PROGRESS NOTE         ED COURSE AND MEDICAL DECISION MAKING  Patient was signed out to me by Dr. Wills at 5:30 AM      Mercy Osman is a 52 year old female who presents with stroke symptoms    Patient signed out to me awaiting transport to [facility].  No acute events while waiting for transport.  Transport arrived at 6 AM and the patient was transferred with EMS    Medications   niCARdipine 40 mg in 200 mL NS (CARDENE) infusion (7.5 mg/hr Intravenous Rate/Dose Verify 4/8/23 0531)   iopamidol (ISOVUE-370) solution 75 mL (75 mLs Intravenous $Given 4/8/23 0034)   potassium chloride 10 mEq in 100 mL sterile water infusion (0 mEq Intravenous Stopped 4/8/23 0684)     New Prescriptions    No medications on file       LAB  Pertinent labs results reviewed   Results for orders placed or performed during the hospital encounter of 04/08/23   CTA Head Neck with Contrast     Status: Abnormal   Result Value Ref Range    Radiologist flags Acute intracranial hemorrhage (AA)     Narrative    EXAM: CTA HEAD NECK W CONTRAST  LOCATION: [facility]  DATE/TIME: 4/8/2023 12:31 AM    INDICATION: Code Stroke to evaluate for potential thrombolysis and thrombectomy. PLEASE READ IMMEDIATELY.  COMPARISON: None.  CONTRAST: ISOVUE 370 75ML  TECHNIQUE: Head and neck CT angiogram with IV contrast. Noncontrast head CT followed by axial helical CT images of the head and neck vessels obtained during the arterial phase of intravenous contrast administration. Axial 2D reconstructed images and   multiplanar 3D MIP reconstructed images of the head and neck vessels were performed by the technologist. Dose reduction techniques were used. All stenosis measurements made according to NASCET criteria unless otherwise specified.    FINDINGS:   NONCONTRAST HEAD CT:   INTRACRANIAL CONTENTS: There is a 2.7 x 1.4 x 2.5 cm (TR by AP by CC) acute hemorrhage in the left thalamus/left internal capsule with involvement of the  left basal ganglia. No definite intraventricular extension. Slight left to right bowing of the   midline structures. Basal cisterns are widely patent. Mild presumed chronic small vessel ischemic changes. Chronic lacunar infarcts in the bilateral corona radiata. Normal ventricles and sulci.     VISUALIZED ORBITS/SINUSES/MASTOIDS: No intraorbital abnormality. No paranasal sinus mucosal disease. No middle ear or mastoid effusion.    BONES/SOFT TISSUES: No acute abnormality.    HEAD CTA:  ANTERIOR CIRCULATION: Scattered atheromatous disease. No stenosis/occlusion, aneurysm, or high flow vascular malformation. Fetal origin of the right posterior cerebral artery from the anterior circulation.    POSTERIOR CIRCULATION: No stenosis/occlusion, aneurysm, or high flow vascular malformation. Balanced vertebral arteries supply a normal basilar artery.     DURAL VENOUS SINUSES: Not well evaluated on a technical basis.    NECK CTA:  RIGHT CAROTID: No measurable stenosis or dissection.    LEFT CAROTID: No measurable stenosis or dissection.    VERTEBRAL ARTERIES: No focal stenosis or dissection. Balanced vertebral arteries.    AORTIC ARCH: Classic aortic arch anatomy with no significant stenosis at the origin of the great vessels.    NONVASCULAR STRUCTURES: Unremarkable.      Impression    IMPRESSION:   HEAD CT:    1. 2.7 x 1.4 x 2.5 cm acute hemorrhage in the left thalamus/left internal capsule with involvement of the left basal ganglia. No intraventricular extension. This is typical of a hypertensive bleed.  2. Slight left to right bowing of the midline structures.     HEAD CTA: No stenosis/occlusion, aneurysm, or high flow vascular malformation.    NECK CTA:  No measurable stenosis or dissection.      [Critical Result: Acute intracranial hemorrhage]    Finding was identified on 4/8/2023 12:36 AM.     Dr. Wills was contacted by me on 4/8/2023 12:34 AM and verbalized understanding of the critical result.      Basic metabolic panel      Status: Abnormal   Result Value Ref Range    Sodium 141 136 - 145 mmol/L    Potassium 2.6 (LL) 3.4 - 5.3 mmol/L    Chloride 103 98 - 107 mmol/L    Carbon Dioxide (CO2) 26 22 - 29 mmol/L    Anion Gap 12 7 - 15 mmol/L    Urea Nitrogen 6.6 6.0 - 20.0 mg/dL    Creatinine 0.68 0.51 - 0.95 mg/dL    Calcium 9.3 8.6 - 10.0 mg/dL    Glucose 213 (H) 70 - 99 mg/dL    GFR Estimate >90 >60 mL/min/1.73m2   INR     Status: Normal   Result Value Ref Range    INR 0.99 0.85 - 1.15   Partial thromboplastin time     Status: Normal   Result Value Ref Range    aPTT 29 22 - 38 Seconds   Troponin T, High Sensitivity     Status: Normal   Result Value Ref Range    Troponin T, High Sensitivity 10 <=14 ng/L   CBC with platelets and differential     Status: Abnormal   Result Value Ref Range    WBC Count 6.7 4.0 - 11.0 10e3/uL    RBC Count 4.51 3.80 - 5.20 10e6/uL    Hemoglobin 11.5 (L) 11.7 - 15.7 g/dL    Hematocrit 36.9 35.0 - 47.0 %    MCV 82 78 - 100 fL    MCH 25.5 (L) 26.5 - 33.0 pg    MCHC 31.2 (L) 31.5 - 36.5 g/dL    RDW 16.0 (H) 10.0 - 15.0 %    Platelet Count 287 150 - 450 10e3/uL    % Neutrophils 50 %    % Lymphocytes 37 %    % Monocytes 8 %    % Eosinophils 4 %    % Basophils 1 %    % Immature Granulocytes 0 %    NRBCs per 100 WBC 0 <1 /100    Absolute Neutrophils 3.4 1.6 - 8.3 10e3/uL    Absolute Lymphocytes 2.5 0.8 - 5.3 10e3/uL    Absolute Monocytes 0.6 0.0 - 1.3 10e3/uL    Absolute Eosinophils 0.2 0.0 - 0.7 10e3/uL    Absolute Basophils 0.0 0.0 - 0.2 10e3/uL    Absolute Immature Granulocytes 0.0 <=0.4 10e3/uL    Absolute NRBCs 0.0 10e3/uL   Adult Type and Screen     Status: None   Result Value Ref Range    ABO/RH(D) A POS     Antibody Screen Negative Negative    SPECIMEN EXPIRATION DATE [id number]    CBC with Platelets & Differential     Status: Abnormal    Narrative    The following orders were created for panel order CBC with Platelets & Differential.  Procedure                               Abnormality         Status                      ---------                               -----------         ------                     CBC with platelets and d...[[id number]]  Abnormal            Final result                 Please view results for these tests on the individual orders.   ABO/Rh type and screen     Status: None    Narrative    The following orders were created for panel order ABO/Rh type and screen.  Procedure                               Abnormality         Status                     ---------                               -----------         ------                     Adult Type and Screen[[id number]]                            Final result                 Please view results for these tests on the individual orders.         RADIOLOGY    Pertinent imaging reviewed   Please see official radiology report.  CTA Head Neck with Contrast   Final Result   Abnormal   IMPRESSION:    HEAD CT:      1. 2.7 x 1.4 x 2.5 cm acute hemorrhage in the left thalamus/left internal capsule with involvement of the left basal ganglia. No intraventricular extension. This is typical of a hypertensive bleed.   2. Slight left to right bowing of the midline structures.       HEAD CTA: No stenosis/occlusion, aneurysm, or high flow vascular malformation.      NECK CTA:   No measurable stenosis or dissection.         [Critical Result: Acute intracranial hemorrhage]      Finding was identified on 4/8/2023 12:36 AM.       Dr. Wills was contacted by me on 4/8/2023 12:34 AM and verbalized understanding of the critical result.              FINAL IMPRESSION    1. Hypertensive intracerebral hemorrhage (H)    2. Intracranial hemorrhage (H)    3. Acute right-sided weakness    4. Hypokalemia            Kayli Kumar MD  04/08/23 0612

## 2023-04-08 NOTE — ED NOTES
RN tried another IV placement, pt was able to move her arm twisted and pronation. Pt unable to hold her arm against gravity.

## 2023-04-09 ENCOUNTER — APPOINTMENT (OUTPATIENT)
Dept: CARDIOLOGY | Facility: CLINIC | Age: 53
End: 2023-04-09
Attending: NURSE PRACTITIONER
Payer: COMMERCIAL

## 2023-04-09 LAB
ANION GAP SERPL CALCULATED.3IONS-SCNC: 13 MMOL/L (ref 7–15)
BUN SERPL-MCNC: 3.1 MG/DL (ref 6–20)
CALCIUM SERPL-MCNC: 9.4 MG/DL (ref 8.6–10)
CHLORIDE SERPL-SCNC: 108 MMOL/L (ref 98–107)
CREAT SERPL-MCNC: 0.64 MG/DL (ref 0.51–0.95)
DEPRECATED HCO3 PLAS-SCNC: 20 MMOL/L (ref 22–29)
ERYTHROCYTE [DISTWIDTH] IN BLOOD BY AUTOMATED COUNT: 16.9 % (ref 10–15)
GFR SERPL CREATININE-BSD FRML MDRD: >90 ML/MIN/1.73M2
GLUCOSE BLDC GLUCOMTR-MCNC: 113 MG/DL (ref 70–99)
GLUCOSE BLDC GLUCOMTR-MCNC: 118 MG/DL (ref 70–99)
GLUCOSE BLDC GLUCOMTR-MCNC: 82 MG/DL (ref 70–99)
GLUCOSE SERPL-MCNC: 115 MG/DL (ref 70–99)
HCT VFR BLD AUTO: 41.4 % (ref 35–47)
HGB BLD-MCNC: 12.5 G/DL (ref 11.7–15.7)
HOLD SPECIMEN: NORMAL
LVEF ECHO: NORMAL
MAGNESIUM SERPL-MCNC: 1.7 MG/DL (ref 1.7–2.3)
MCH RBC QN AUTO: 25 PG (ref 26.5–33)
MCHC RBC AUTO-ENTMCNC: 30.2 G/DL (ref 31.5–36.5)
MCV RBC AUTO: 83 FL (ref 78–100)
PHOSPHATE SERPL-MCNC: 3.3 MG/DL (ref 2.5–4.5)
PLATELET # BLD AUTO: 316 10E3/UL (ref 150–450)
POTASSIUM SERPL-SCNC: 3.4 MMOL/L (ref 3.4–5.3)
POTASSIUM SERPL-SCNC: 3.8 MMOL/L (ref 3.4–5.3)
RBC # BLD AUTO: 5.01 10E6/UL (ref 3.8–5.2)
SARS-COV-2 RNA RESP QL NAA+PROBE: NEGATIVE
SODIUM SERPL-SCNC: 141 MMOL/L (ref 136–145)
TROPONIN T SERPL HS-MCNC: 51 NG/L
TROPONIN T SERPL HS-MCNC: 52 NG/L
WBC # BLD AUTO: 7 10E3/UL (ref 4–11)

## 2023-04-09 PROCEDURE — 250N000013 HC RX MED GY IP 250 OP 250 PS 637: Performed by: STUDENT IN AN ORGANIZED HEALTH CARE EDUCATION/TRAINING PROGRAM

## 2023-04-09 PROCEDURE — 85027 COMPLETE CBC AUTOMATED: CPT | Performed by: NURSE PRACTITIONER

## 2023-04-09 PROCEDURE — 250N000013 HC RX MED GY IP 250 OP 250 PS 637: Performed by: NURSE PRACTITIONER

## 2023-04-09 PROCEDURE — 999N000208 ECHOCARDIOGRAM COMPLETE

## 2023-04-09 PROCEDURE — 80048 BASIC METABOLIC PNL TOTAL CA: CPT | Performed by: NURSE PRACTITIONER

## 2023-04-09 PROCEDURE — 93306 TTE W/DOPPLER COMPLETE: CPT | Mod: 26 | Performed by: INTERNAL MEDICINE

## 2023-04-09 PROCEDURE — 250N000013 HC RX MED GY IP 250 OP 250 PS 637: Performed by: PSYCHIATRY & NEUROLOGY

## 2023-04-09 PROCEDURE — 99233 SBSQ HOSP IP/OBS HIGH 50: CPT | Mod: FS | Performed by: NURSE PRACTITIONER

## 2023-04-09 PROCEDURE — 84100 ASSAY OF PHOSPHORUS: CPT | Performed by: NURSE PRACTITIONER

## 2023-04-09 PROCEDURE — 84484 ASSAY OF TROPONIN QUANT: CPT | Performed by: STUDENT IN AN ORGANIZED HEALTH CARE EDUCATION/TRAINING PROGRAM

## 2023-04-09 PROCEDURE — 250N000011 HC RX IP 250 OP 636: Performed by: NURSE PRACTITIONER

## 2023-04-09 PROCEDURE — 250N000011 HC RX IP 250 OP 636: Performed by: PSYCHIATRY & NEUROLOGY

## 2023-04-09 PROCEDURE — 36415 COLL VENOUS BLD VENIPUNCTURE: CPT | Performed by: NURSE PRACTITIONER

## 2023-04-09 PROCEDURE — 83735 ASSAY OF MAGNESIUM: CPT | Performed by: NURSE PRACTITIONER

## 2023-04-09 PROCEDURE — U0005 INFEC AGEN DETEC AMPLI PROBE: HCPCS | Performed by: NURSE PRACTITIONER

## 2023-04-09 PROCEDURE — 250N000009 HC RX 250: Performed by: NURSE PRACTITIONER

## 2023-04-09 PROCEDURE — 36415 COLL VENOUS BLD VENIPUNCTURE: CPT | Performed by: PSYCHIATRY & NEUROLOGY

## 2023-04-09 PROCEDURE — 84132 ASSAY OF SERUM POTASSIUM: CPT | Performed by: PSYCHIATRY & NEUROLOGY

## 2023-04-09 PROCEDURE — 120N000002 HC R&B MED SURG/OB UMMC

## 2023-04-09 PROCEDURE — 255N000002 HC RX 255 OP 636: Performed by: INTERNAL MEDICINE

## 2023-04-09 RX ORDER — POTASSIUM CHLORIDE 7.45 MG/ML
10 INJECTION INTRAVENOUS
Status: COMPLETED | OUTPATIENT
Start: 2023-04-09 | End: 2023-04-09

## 2023-04-09 RX ORDER — AMLODIPINE BESYLATE 10 MG/1
10 TABLET ORAL DAILY
Status: DISCONTINUED | OUTPATIENT
Start: 2023-04-09 | End: 2023-04-09

## 2023-04-09 RX ORDER — MAGNESIUM SULFATE HEPTAHYDRATE 40 MG/ML
2 INJECTION, SOLUTION INTRAVENOUS ONCE
Status: COMPLETED | OUTPATIENT
Start: 2023-04-09 | End: 2023-04-09

## 2023-04-09 RX ORDER — LISINOPRIL 10 MG/1
10 TABLET ORAL DAILY
Status: DISCONTINUED | OUTPATIENT
Start: 2023-04-09 | End: 2023-04-10

## 2023-04-09 RX ORDER — SODIUM CHLORIDE, SODIUM GLUCONATE, SODIUM ACETATE, POTASSIUM CHLORIDE AND MAGNESIUM CHLORIDE 526; 502; 368; 37; 30 MG/100ML; MG/100ML; MG/100ML; MG/100ML; MG/100ML
INJECTION, SOLUTION INTRAVENOUS CONTINUOUS
Status: DISCONTINUED | OUTPATIENT
Start: 2023-04-09 | End: 2023-04-12

## 2023-04-09 RX ORDER — LISINOPRIL 20 MG/1
20 TABLET ORAL DAILY
Status: DISCONTINUED | OUTPATIENT
Start: 2023-04-09 | End: 2023-04-09

## 2023-04-09 RX ADMIN — ACETAMINOPHEN 650 MG: 325 TABLET, FILM COATED ORAL at 19:52

## 2023-04-09 RX ADMIN — POTASSIUM CHLORIDE 10 MEQ: 7.46 INJECTION, SOLUTION INTRAVENOUS at 00:48

## 2023-04-09 RX ADMIN — HUMAN ALBUMIN MICROSPHERES AND PERFLUTREN 3 ML: 10; .22 INJECTION, SOLUTION INTRAVENOUS at 09:04

## 2023-04-09 RX ADMIN — POLYETHYLENE GLYCOL 3350 17 G: 17 POWDER, FOR SOLUTION ORAL at 09:29

## 2023-04-09 RX ADMIN — POTASSIUM CHLORIDE 10 MEQ: 7.46 INJECTION, SOLUTION INTRAVENOUS at 00:46

## 2023-04-09 RX ADMIN — POTASSIUM CHLORIDE 10 MEQ: 7.46 INJECTION, SOLUTION INTRAVENOUS at 06:47

## 2023-04-09 RX ADMIN — LABETALOL HYDROCHLORIDE 10 MG: 5 INJECTION, SOLUTION INTRAVENOUS at 16:01

## 2023-04-09 RX ADMIN — SODIUM CHLORIDE, SODIUM GLUCONATE, SODIUM ACETATE, POTASSIUM CHLORIDE AND MAGNESIUM CHLORIDE: 526; 502; 368; 37; 30 INJECTION, SOLUTION INTRAVENOUS at 09:29

## 2023-04-09 RX ADMIN — POTASSIUM CHLORIDE 10 MEQ: 7.46 INJECTION, SOLUTION INTRAVENOUS at 05:40

## 2023-04-09 RX ADMIN — SENNOSIDES AND DOCUSATE SODIUM 2 TABLET: 50; 8.6 TABLET ORAL at 19:52

## 2023-04-09 RX ADMIN — LISINOPRIL 10 MG: 10 TABLET ORAL at 13:10

## 2023-04-09 RX ADMIN — MAGNESIUM SULFATE IN WATER 2 G: 40 INJECTION, SOLUTION INTRAVENOUS at 03:02

## 2023-04-09 ASSESSMENT — ACTIVITIES OF DAILY LIVING (ADL)
ADLS_ACUITY_SCORE: 41
ADLS_ACUITY_SCORE: 26
ADLS_ACUITY_SCORE: 41
ADLS_ACUITY_SCORE: 41
ADLS_ACUITY_SCORE: 26
ADLS_ACUITY_SCORE: 41
ADLS_ACUITY_SCORE: 42
ADLS_ACUITY_SCORE: 26
ADLS_ACUITY_SCORE: 41
DEPENDENT_IADLS:: INDEPENDENT
ADLS_ACUITY_SCORE: 41

## 2023-04-09 NOTE — PROGRESS NOTES
Brief Stroke Neurology Progress Note    Mercy Osman is a 53-year-old female, with a past medical history that includes hypertension, chronic anemia, and prior Bell's palsy, who was admitted on 4/8 after she developed acute-onset weakness of the right hemibody.  She was found to have acute intracranial hemorrhage (ICH) of the left basal ganglia.  Mechanism of this ICH is thought to be poorly controlled hypertension.  Patient's blood pressure on arrival to the emergency department was 227/115.  She was initially managed on a nicardipine drip in the Neuro ICU, but was transitioned to a regimen of lisinopril 10 mg daily and as needed IV antihypertensives on 4/9.  She was deemed to be stable for transfer to the floor on 4/9.    Patient was evaluated at the bedside on 4/9.  She and her family members denied new subjective concerns.  Her neurologic exam was stable from prior exams documented by the neuro critical care team, and was notable for right facial droop and moderate to severe weakness of the right hemibody (RUE is weaker than RLE).  Patient was able to follow simple commands when they were given in her native language, but struggled to follow complex commands that involved sequencing multiple actions or crossing the midline. She did not respond to orientation questions, but was able to verbally identify family members in the room. The stroke neurology service assumed the role of the patient's primary team on the afternoon of 4/9.    Signed,    Hector Cardoso MD  PGY-1, Stroke Neurology Service

## 2023-04-09 NOTE — PROVIDER NOTIFICATION
Neurocrit notified of neuro change. Pt unable to move RLE and not responding to orientation questions. Also notified of diastolic BP >100. Provider saw pt at bedside and CT ordered. Provider aware of results.

## 2023-04-09 NOTE — CONSULTS
Care Management Initial Consult    General Information  Assessment completed with: Children, Patient, Adult children because they spoke english, they will translate what we discussed back to their mom  Type of CM/SW Visit: Initial Assessment    Primary Care Provider verified and updated as needed: Yes   Readmission within the last 30 days:        Reason for Consult: other (see comments) (consult for stroke)  Advance Care Planning: Advance Care Planning Reviewed: no concerns identified (family will consult with providers if and when this is needed- they don't feel she needs a document because they can speak for her wishes)          Communication Assessment  Patient's communication style: spoken language (non-English) (kids very willing to translate)             Cognitive  Cognitive/Neuro/Behavioral: (P) .WDL except, motor response, speech  Level of Consciousness: (P) alert  Arousal Level: (P) opens eyes spontaneously  Orientation: (P) disoriented to, situation  Mood/Behavior: (P) calm, cooperative  Best Language: (P) 1 - Mild to moderate  Speech: (P) clear, slow    Living Environment:   People in home: spouse, child(riaz), adult     Current living Arrangements: house      Able to return to prior arrangements: yes       Family/Social Support:  Care provided by: self  Provides care for: no one  Marital Status:   , Children          Description of Support System: Supportive, Involved    Support Assessment: Adequate family and caregiver support, Adequate social supports    Current Resources:   Patient receiving home care services: No     Community Resources: Other (see comment) (food stamps)  Equipment currently used at home: none  Supplies currently used at home: None    Employment/Financial:  Employment Status: retired        Financial Concerns: No concerns identified   Referral to Financial Worker: No       Lifestyle & Psychosocial Needs:  Social Determinants of Health     Tobacco Use: Not on file    Alcohol Use: Not on file   Financial Resource Strain: Not on file   Food Insecurity: Not on file   Transportation Needs: Not on file   Physical Activity: Not on file   Stress: Not on file   Social Connections: Not on file   Intimate Partner Violence: Not on file   Depression: Not on file   Housing Stability: Not on file       Functional Status:  Prior to admission patient needed assistance:   Dependent ADLs:: Independent  Dependent IADLs:: Independent       Mental Health Status:  Mental Health Status: No Current Concerns       Chemical Dependency Status:  Chemical Dependency Status: No Current Concerns             Values/Beliefs:  Spiritual, Cultural Beliefs, Denominational Practices, Values that affect care: no               Additional Information:  2:15 SW met with patient and family members at bedside. SW spoke with adult children as they were willing to translate what we discussed in our conversation back to [facility]. The children were able to confirm her PCP and residence letting this SW know that she lives in a house with her  (who was present in the room) as well as one of the 3 adult children who were present live with them and have other adult siblings who live together at the house. They said prior to this visit she was completely independent and able to care for herself with no other significant medical concerns. The only community resource the patient utilizes is food stamps as Mercy and selvin  are retired and getting social security moneyThey stated that they are unsure what discharge plans are because this is so recent of admitting to ICU, but they anticipate her going back home with family via their family car. The son asked about her being transferred stating that she may be able to go upstairs per what they have been told and are waiting for a bed. This SW explained the difference between units and the ICU is typically for very intense cases meaning that movement between floor is common and  usually a few transfers between floors and units happen before going home. This SW also explained that stroke patients typically start in ICU and may get transferred between floors as time goes on and that stroke patients get seen by SW's because of health acuity and seriousness. The children had no further questions or concerns at this time and SW explained should she need any services or transitions of care for discharging, that another SW may aid them in this process.        Sukh Haley, CHAD, [facility]   Covering ICU SW  Pager: [phone]

## 2023-04-09 NOTE — PHARMACY-ADMISSION MEDICATION HISTORY
Admission Medication History Completed by Pharmacy    See [facility] Admission Navigator for allergy information, preferred outpatient pharmacy, prior to admission medications and immunization status.     Medication History Sources:     Spoke in person with son Mamta who showed me a picture of the patient's only medication vial    Changes made to PTA medication list (reason):    Added: None    Deleted: None    Changed: None    Additional Information:    None    Prior to Admission medications    Medication Sig Last Dose Taking? Auth Provider Long Term End Date   amLODIPine-benazepril (LOTREL) 10-20 MG capsule 1 capsule   Reported, Patient Yes      Medications have been reviewed by me and are current to the best of my knowledge and ability.    Date completed: 04/08/23    Medication history completed by: Silke Powers, HollyD

## 2023-04-09 NOTE — PLAN OF CARE
Major Shift Events:  Neuro unchanged. RUE still only withdraws, RLE +2 strength with no fine motor movement in toes or foot. AxO X3. Was able to pivot to chair with heavy assist of 1. Incontinence X3. Maintain puree diet and thin liquids.    Plan: Transferred to  @1630.  For vital signs and complete assessments, please see documentation flowsheets.

## 2023-04-09 NOTE — PLAN OF CARE
Goal Outcome Evaluation:  BP (!) 138/90 (BP Location: Left arm)   Pulse 73   Temp 97.6  F (36.4  C) (Oral)   Resp 17   Wt 52.6 kg (115 lb 15.4 oz)   SpO2 100%   BMI 24.24 kg/m      Care from: 3222-8109    Hypertensive but otherwise vitally stable on RA. A&Ox3, disoriented to situation and needed choices to answer the other questions correctly. Family present at bedside to translate. Has right sided weakness and droop. RUE strength 1/5; only withdraws, LUE and LLE 5/5, RLE 2/5. Purewick in place, no BM. On puree diet with thin liquids. Heavy assist of 1 with walker and GB, not OOB this shift. Continue with neuro checks and plan of care.

## 2023-04-09 NOTE — PROGRESS NOTES
Neurocritical Care Progress Note    Reason for critical care admission: ICH  Admitting Team: TOD  Date of Service:  04/09/2023  Date of Admission:  4/8/2023  Hospital Day: 2    Assessment/Plan  Ms. Mercy Osman is a 52 year old woman with a past medical history of HTN admitted on 04/08/23 for spontaneous ICH, likely hypertensive in etiology. She presented to [facility] after she fell due to sudden onset of right-sided weakness.      She was deemed suitable for transfer to [facility] for further management.    24 Hour Update: CT last evening for decreased movement of RLE stable.      Neuro  #Spontaneous left thalamic intracranial hemorrhage, likely secondary to uncontrolled hypertension   #Vasogenic cerebral edema secondary to ICH  - ICH Score at admission: 0  - Hold all antiplatelet and anticoagulant medications  - Neurochecks Q4H  - Systolic BP Goal: < 160  - Labetalol and hydralazine PRN available   - Avoid hypotonic IV fluids  - Head of bed elevated 30 degrees   - PT/OT/SLP when appropriate      #Analgesics & sedation  - acetaminophen prn     CV  #HTN  - Cardiac monitoring  - SBP goal < 160 mmHg   - Labetalol and hydralazine available   - Hold PTA amlodipine-benazepril  - Initiate Lisinopril 10 mg daily     #Troponin elevation  - Trend 32 - 40 - 45 - 50 - 51   - EKG without obvious acute myocardial ischemia   - Echocardiogram pending     #Hyperlipidemia  - Direct   - Consider statin      Resp  #DELON  Oxygen/vent: none  - Continuous pulse ox  - Maintain O2 saturations greater than 92%     GI  #DELON  Diet: Pureed diet, thin liquids   GI prophylaxis: not indicated  - Bowel regimen: scheduled senna-docusate and Miralax     Renal/  #Hypokalemia  #Hyperchloridemia   - Daily BMP  - IV fluids: Plasmalyte 50 ml/hour   - Electrolyte replacement protocol     Endo  #Pre-diabetes   - Hgb A1c 6.2% 4/8/2023   - TSH pending   - Monitor glucose levels     Heme  #Anemia, chronic   - Daily CBC  - Hgb goal >7, plt goal  >50k  - Transfuse to meet Hgb and plt goals     ID  #[email or web address]  - Daily CBC  - Follow temperature curve     ICU Check List  Lines/tubes/drains: PIV  FEN: Plasmalyte 50 ml/hour, electrolyte protocols, pureed diet   PPX: DVT - SCDs (pharm contraindicated due to bleed); GI - not indicated .  Code: Full Code   Dispo: ICU - NCC     TIME SPENT ON THIS ENCOUNTER  I spent 52 minutes of critical care time on the unit/floor managing the care of Mercy Osman excluding time performing procedures. Upon evaluation, this patient had a high probability of imminent or life-threatening deterioration due to ICH, which required my direct attention, intervention, and personal management. Greater than 50% of my time was spent at the bedside counseling the patient and/or coordinating care including chart review, history, exam, documentation, and further activities per this note. I have personally reviewed the following data/imaging over the past 24 hours.      The patient was seen and discussed with the NCC attending, Dr. Javid Silva.     Jaelyn Ingram, APRN, CNP  Neurocritical Care *[id number]    24 Hour Vital Signs Summary:  Temp: 98.4  F (36.9  C) Temp  Min: 98.2  F (36.8  C)  Max: 98.5  F (36.9  C)  Resp: 15 Resp  Min: 14  Max: 16  SpO2: 99 % SpO2  Min: 95 %  Max: 100 %  Pulse: 75 Pulse  Min: 68  Max: 111    No data recorded  BP: (!) 156/107 Systolic (24hrs), Av , Min:114 , Max:186   Diastolic (24hrs), Av, Min:86, Max:174      Respiratory monitoring:   Resp: 15      I/O last 3 completed shifts:  In:  [I.V.:]  Out:  [Urine:]    Current Medications:    insulin aspart  1-7 Units Subcutaneous TID AC     insulin aspart  1-5 Units Subcutaneous At Bedtime     polyethylene glycol  17 g Oral or Feeding Tube Daily     potassium chloride  10 mEq Intravenous Q1H     senna-docusate  2 tablet Oral or Feeding Tube QPM       PRN Medications:  acetaminophen **OR** [DISCONTINUED] acetaminophen **OR** acetaminophen, glucose **OR**  dextrose **OR** glucagon, hydrALAZINE, labetalol, ondansetron, prochlorperazine    Infusions:      No Known Allergies    Physical Examination:  Vitals: BP (!) 156/107   Pulse 75   Temp 98.4  F (36.9  C) (Oral)   Resp 15   Wt 52.6 kg (115 lb 15.4 oz)   SpO2 99%   BMI 24.24 kg/m    General: Adult female patient, lying in bed, critically-ill.  HEENT: Normocephalic, atraumatic.  Cardiac: She appears adequately perfused.   Pulm: She is not hypoxic, no tachypnea.  Abdomen: Soft, non-distended.  Extremities: Warm, no edema, distal extremities appear adequately perfused.   Skin: No obvious rash or lesion on exposed skin.   Psych: Calm and cooperative.  Neuro: Exam is limited by language- family is present and willing to serve as .   Mental status: Awake, alert, attentive, speech not always understandable per family. Follows commands when commands are asked by family in native language.   Cranial nerves: PERRL, conjugate gaze, EOMI, right lower facial droop, unable to shrug right shoulder.    Motor: Normal bulk and tone. No abnormal movements. Right upper extremity no movement, right lower extremity weak but does not hit bed.   Sensory: Appears to be intact.   Coordination: Deferred.    Gait: Deferred.    Labs and Imaging:    Results for orders placed or performed during the hospital encounter of 04/08/23 (from the past 24 hour(s))   Troponin T, High Sensitivity   Result Value Ref Range    Troponin T, High Sensitivity 40 (H) <=14 ng/L   Glucose by meter   Result Value Ref Range    GLUCOSE BY METER POCT 116 (H) 70 - 99 mg/dL   Troponin T, High Sensitivity   Result Value Ref Range    Troponin T, High Sensitivity 45 (H) <=14 ng/L   Glucose by meter   Result Value Ref Range    GLUCOSE BY METER POCT 109 (H) 70 - 99 mg/dL   Potassium   Result Value Ref Range    Potassium 3.3 (L) 3.4 - 5.3 mmol/L   Troponin T, High Sensitivity   Result Value Ref Range    Troponin T, High Sensitivity 50 (H) <=14 ng/L   CT Head w/o  Contrast    Narrative    EXAM: CT HEAD W/O CONTRAST  4/8/2023 9:09 PM     HISTORY:  Worsened RLE weakness in pt with known L BG IPH       COMPARISON:  4/8/2023    TECHNIQUE: Using multidetector thin collimation helical acquisition  technique, axial, coronal and sagittal CT images from the skull base  to the vertex were obtained without intravenous contrast.   (topogram) image(s) also obtained and reviewed.    FINDINGS:  Unchanged appearance of 3.0 x 1.4 cm left thalamic intraparenchymal  hemorrhage with adjacent soft tissue edema. There is unchanged mass  effect on the adjacent cerebral parenchyma and left lateral ventricle.  Chronic lacunar infarcts in the lateral corona radiata . Unchanged  trace rightward midline shift. No acute loss of gray-white matter  differentiation in the cerebral hemispheres. Ventricles are  proportionate to the cerebral sulci. Clear basal cisterns.    The bony calvaria and the bones of the skull base are normal. The  visualized portions of the paranasal sinuses and mastoid air cells are  clear. Grossly normal orbits.       Impression    IMPRESSION: Unchanged intraparenchymal hemorrhage centered in the left  thalamus with associated mass effect. No new hemorrhage or acute  intracranial finding.    I have personally reviewed the examination and initial interpretation  and I agree with the findings.    DEBORAH YI MD         SYSTEM ID:  [id number]   Glucose by meter   Result Value Ref Range    GLUCOSE BY METER POCT 155 (H) 70 - 99 mg/dL   Troponin T, High Sensitivity   Result Value Ref Range    Troponin T, High Sensitivity 51 (H) <=14 ng/L   Basic metabolic panel   Result Value Ref Range    Sodium 141 136 - 145 mmol/L    Potassium 3.4 3.4 - 5.3 mmol/L    Chloride 108 (H) 98 - 107 mmol/L    Carbon Dioxide (CO2) 20 (L) 22 - 29 mmol/L    Anion Gap 13 7 - 15 mmol/L    Urea Nitrogen 3.1 (L) 6.0 - 20.0 mg/dL    Creatinine 0.64 0.51 - 0.95 mg/dL    Calcium 9.4 8.6 - 10.0 mg/dL    Glucose 115  (H) 70 - 99 mg/dL    GFR Estimate >90 >60 mL/min/1.73m2   CBC with platelets   Result Value Ref Range    WBC Count 7.0 4.0 - 11.0 10e3/uL    RBC Count 5.01 3.80 - 5.20 10e6/uL    Hemoglobin 12.5 11.7 - 15.7 g/dL    Hematocrit 41.4 35.0 - 47.0 %    MCV 83 78 - 100 fL    MCH 25.0 (L) 26.5 - 33.0 pg    MCHC 30.2 (L) 31.5 - 36.5 g/dL    RDW 16.9 (H) 10.0 - 15.0 %    Platelet Count 316 150 - 450 10e3/uL   Magnesium   Result Value Ref Range    Magnesium 1.7 1.7 - 2.3 mg/dL   Phosphorus   Result Value Ref Range    Phosphorus 3.3 2.5 - 4.5 mg/dL   Potassium   Result Value Ref Range    Potassium 3.8 3.4 - 5.3 mmol/L   Troponin T, High Sensitivity   Result Value Ref Range    Troponin T, High Sensitivity 52 (H) <=14 ng/L   Extra Tube ([place] Draw)    Narrative    The following orders were created for panel order Extra Tube ([place] Draw).  Procedure                               Abnormality         Status                     ---------                               -----------         ------                     Extra Purple Top Tube[[id number]]                            Final result                 Please view results for these tests on the individual orders.   Extra Purple Top Tube   Result Value Ref Range    Hold Specimen JI    Glucose by meter   Result Value Ref Range    GLUCOSE BY METER POCT 118 (H) 70 - 99 mg/dL       All relevant imaging and laboratory values personally reviewed.

## 2023-04-10 ENCOUNTER — APPOINTMENT (OUTPATIENT)
Dept: EDUCATION SERVICES | Facility: CLINIC | Age: 53
End: 2023-04-10
Attending: STUDENT IN AN ORGANIZED HEALTH CARE EDUCATION/TRAINING PROGRAM
Payer: COMMERCIAL

## 2023-04-10 LAB
ANION GAP SERPL CALCULATED.3IONS-SCNC: 13 MMOL/L (ref 7–15)
ATRIAL RATE - MUSE: 113 BPM
ATRIAL RATE - MUSE: 83 BPM
BUN SERPL-MCNC: 6.7 MG/DL (ref 6–20)
CALCIUM SERPL-MCNC: 9.1 MG/DL (ref 8.6–10)
CHLORIDE SERPL-SCNC: 108 MMOL/L (ref 98–107)
CREAT SERPL-MCNC: 0.73 MG/DL (ref 0.51–0.95)
DEPRECATED HCO3 PLAS-SCNC: 21 MMOL/L (ref 22–29)
DIASTOLIC BLOOD PRESSURE - MUSE: NORMAL MMHG
DIASTOLIC BLOOD PRESSURE - MUSE: NORMAL MMHG
ERYTHROCYTE [DISTWIDTH] IN BLOOD BY AUTOMATED COUNT: 17.1 % (ref 10–15)
GFR SERPL CREATININE-BSD FRML MDRD: >90 ML/MIN/1.73M2
GLUCOSE BLDC GLUCOMTR-MCNC: 105 MG/DL (ref 70–99)
GLUCOSE BLDC GLUCOMTR-MCNC: 85 MG/DL (ref 70–99)
GLUCOSE BLDC GLUCOMTR-MCNC: 87 MG/DL (ref 70–99)
GLUCOSE BLDC GLUCOMTR-MCNC: 88 MG/DL (ref 70–99)
GLUCOSE BLDC GLUCOMTR-MCNC: 96 MG/DL (ref 70–99)
GLUCOSE SERPL-MCNC: 105 MG/DL (ref 70–99)
HCT VFR BLD AUTO: 39.4 % (ref 35–47)
HGB BLD-MCNC: 12 G/DL (ref 11.7–15.7)
HOLD SPECIMEN: NORMAL
INTERPRETATION ECG - MUSE: NORMAL
INTERPRETATION ECG - MUSE: NORMAL
MAGNESIUM SERPL-MCNC: 2 MG/DL (ref 1.7–2.3)
MCH RBC QN AUTO: 25.3 PG (ref 26.5–33)
MCHC RBC AUTO-ENTMCNC: 30.5 G/DL (ref 31.5–36.5)
MCV RBC AUTO: 83 FL (ref 78–100)
P AXIS - MUSE: 17 DEGREES
P AXIS - MUSE: 44 DEGREES
PHOSPHATE SERPL-MCNC: 4 MG/DL (ref 2.5–4.5)
PLATELET # BLD AUTO: 302 10E3/UL (ref 150–450)
POTASSIUM SERPL-SCNC: 3.2 MMOL/L (ref 3.4–5.3)
POTASSIUM SERPL-SCNC: 4.6 MMOL/L (ref 3.4–5.3)
PR INTERVAL - MUSE: 142 MS
PR INTERVAL - MUSE: 168 MS
QRS DURATION - MUSE: 88 MS
QRS DURATION - MUSE: 90 MS
QT - MUSE: 362 MS
QT - MUSE: 448 MS
QTC - MUSE: 496 MS
QTC - MUSE: 526 MS
R AXIS - MUSE: -4 DEGREES
R AXIS - MUSE: 3 DEGREES
RBC # BLD AUTO: 4.74 10E6/UL (ref 3.8–5.2)
SODIUM SERPL-SCNC: 142 MMOL/L (ref 136–145)
SYSTOLIC BLOOD PRESSURE - MUSE: NORMAL MMHG
SYSTOLIC BLOOD PRESSURE - MUSE: NORMAL MMHG
T AXIS - MUSE: -50 DEGREES
T AXIS - MUSE: 105 DEGREES
TROPONIN T SERPL HS-MCNC: 100 NG/L
TROPONIN T SERPL HS-MCNC: 113 NG/L
TROPONIN T SERPL HS-MCNC: 81 NG/L
TROPONIN T SERPL HS-MCNC: 84 NG/L
VENTRICULAR RATE- MUSE: 113 BPM
VENTRICULAR RATE- MUSE: 83 BPM
WBC # BLD AUTO: 7.2 10E3/UL (ref 4–11)

## 2023-04-10 PROCEDURE — 36415 COLL VENOUS BLD VENIPUNCTURE: CPT | Performed by: STUDENT IN AN ORGANIZED HEALTH CARE EDUCATION/TRAINING PROGRAM

## 2023-04-10 PROCEDURE — 84132 ASSAY OF SERUM POTASSIUM: CPT | Performed by: STUDENT IN AN ORGANIZED HEALTH CARE EDUCATION/TRAINING PROGRAM

## 2023-04-10 PROCEDURE — 93005 ELECTROCARDIOGRAM TRACING: CPT

## 2023-04-10 PROCEDURE — 82310 ASSAY OF CALCIUM: CPT | Performed by: NURSE PRACTITIONER

## 2023-04-10 PROCEDURE — 83735 ASSAY OF MAGNESIUM: CPT | Performed by: NURSE PRACTITIONER

## 2023-04-10 PROCEDURE — 250N000013 HC RX MED GY IP 250 OP 250 PS 637: Performed by: NURSE PRACTITIONER

## 2023-04-10 PROCEDURE — 250N000009 HC RX 250: Performed by: NURSE PRACTITIONER

## 2023-04-10 PROCEDURE — 250N000013 HC RX MED GY IP 250 OP 250 PS 637: Performed by: STUDENT IN AN ORGANIZED HEALTH CARE EDUCATION/TRAINING PROGRAM

## 2023-04-10 PROCEDURE — 84484 ASSAY OF TROPONIN QUANT: CPT | Performed by: STUDENT IN AN ORGANIZED HEALTH CARE EDUCATION/TRAINING PROGRAM

## 2023-04-10 PROCEDURE — 120N000002 HC R&B MED SURG/OB UMMC

## 2023-04-10 PROCEDURE — 85027 COMPLETE CBC AUTOMATED: CPT | Performed by: NURSE PRACTITIONER

## 2023-04-10 PROCEDURE — 250N000013 HC RX MED GY IP 250 OP 250 PS 637: Performed by: PSYCHIATRY & NEUROLOGY

## 2023-04-10 PROCEDURE — 84484 ASSAY OF TROPONIN QUANT: CPT

## 2023-04-10 PROCEDURE — 99232 SBSQ HOSP IP/OBS MODERATE 35: CPT | Mod: GC | Performed by: PSYCHIATRY & NEUROLOGY

## 2023-04-10 PROCEDURE — 84100 ASSAY OF PHOSPHORUS: CPT | Performed by: NURSE PRACTITIONER

## 2023-04-10 PROCEDURE — 36415 COLL VENOUS BLD VENIPUNCTURE: CPT | Performed by: NURSE PRACTITIONER

## 2023-04-10 PROCEDURE — 93010 ELECTROCARDIOGRAM REPORT: CPT | Performed by: INTERNAL MEDICINE

## 2023-04-10 PROCEDURE — 36415 COLL VENOUS BLD VENIPUNCTURE: CPT

## 2023-04-10 PROCEDURE — 250N000011 HC RX IP 250 OP 636: Performed by: NURSE PRACTITIONER

## 2023-04-10 RX ORDER — POTASSIUM CHLORIDE 1.5 G/1.58G
40 POWDER, FOR SOLUTION ORAL ONCE
Status: COMPLETED | OUTPATIENT
Start: 2023-04-10 | End: 2023-04-10

## 2023-04-10 RX ORDER — LISINOPRIL 20 MG/1
20 TABLET ORAL DAILY
Status: DISCONTINUED | OUTPATIENT
Start: 2023-04-11 | End: 2023-04-14 | Stop reason: HOSPADM

## 2023-04-10 RX ORDER — MAGNESIUM OXIDE 400 MG/1
400 TABLET ORAL EVERY 4 HOURS
Status: COMPLETED | OUTPATIENT
Start: 2023-04-10 | End: 2023-04-10

## 2023-04-10 RX ADMIN — SODIUM CHLORIDE, SODIUM GLUCONATE, SODIUM ACETATE, POTASSIUM CHLORIDE AND MAGNESIUM CHLORIDE: 526; 502; 368; 37; 30 INJECTION, SOLUTION INTRAVENOUS at 04:54

## 2023-04-10 RX ADMIN — LISINOPRIL 10 MG: 10 TABLET ORAL at 08:16

## 2023-04-10 RX ADMIN — POLYETHYLENE GLYCOL 3350 17 G: 17 POWDER, FOR SOLUTION ORAL at 08:20

## 2023-04-10 RX ADMIN — POTASSIUM CHLORIDE 40 MEQ: 1.5 POWDER, FOR SOLUTION ORAL at 08:18

## 2023-04-10 RX ADMIN — MAGNESIUM OXIDE TAB 400 MG (241.3 MG ELEMENTAL MG) 400 MG: 400 (241.3 MG) TAB at 08:16

## 2023-04-10 RX ADMIN — LABETALOL HYDROCHLORIDE 10 MG: 5 INJECTION, SOLUTION INTRAVENOUS at 12:12

## 2023-04-10 RX ADMIN — MAGNESIUM OXIDE TAB 400 MG (241.3 MG ELEMENTAL MG) 400 MG: 400 (241.3 MG) TAB at 12:16

## 2023-04-10 RX ADMIN — SODIUM CHLORIDE, SODIUM GLUCONATE, SODIUM ACETATE, POTASSIUM CHLORIDE AND MAGNESIUM CHLORIDE: 526; 502; 368; 37; 30 INJECTION, SOLUTION INTRAVENOUS at 22:21

## 2023-04-10 RX ADMIN — ACETAMINOPHEN 650 MG: 325 TABLET, FILM COATED ORAL at 04:52

## 2023-04-10 RX ADMIN — ACETAMINOPHEN 650 MG: 325 TABLET, FILM COATED ORAL at 20:25

## 2023-04-10 RX ADMIN — ACETAMINOPHEN 650 MG: 325 TABLET, FILM COATED ORAL at 16:07

## 2023-04-10 ASSESSMENT — ACTIVITIES OF DAILY LIVING (ADL)
ADLS_ACUITY_SCORE: 32
ADLS_ACUITY_SCORE: 26
ADLS_ACUITY_SCORE: 36
ADLS_ACUITY_SCORE: 30
ADLS_ACUITY_SCORE: 32
ADLS_ACUITY_SCORE: 32
ADLS_ACUITY_SCORE: 26
ADLS_ACUITY_SCORE: 32
ADLS_ACUITY_SCORE: 26
ADLS_ACUITY_SCORE: 32
ADLS_ACUITY_SCORE: 26
ADLS_ACUITY_SCORE: 36

## 2023-04-10 NOTE — PLAN OF CARE
Status: Admitted for acute intracranial hemorrhage (ICH) of left basal ganglia. Mechanism of this ICH is thought to be poorly controlled HTN. Hx: HTN, chronic anemia, and prior Bell's palsy.   Vitals: VSS on RA ex hypertensive out of parameters, given 10mg of labetolol, MD notified. SBP<160. On continuous pulse ox and CCM.  Neuros: Hmong speaking, daughter translating at bedside. A&Ox4, needing choices for situation. Flat, withdrawn. Slow, whispered speech. R droop. R sided numbness including face. Strengths: L side 5/5, RUE 1/5 and withdraws, RLE 3/5 and able to lift off bed.  IV: PIV x2: one PIV infusing plasmalyte at 50mL/hr, one PIV SL.  Labs/Electrolytes: on electrolyte protocol, replaced magnesium and potassium replaced this shift. K+ redraw @ 4.6. All redraws ordered for tmw AM. Troponin T elevated @ 84 @ 0931, EKG ordered. EKG showed prolonged QT, now trending troponin T q6hr, last one critical troponin of 100 @ 1813.  Resp/trach: on RA. Denies SOB.  Diet: Pureed w/ thins. Takes pills whole in applesauce. family brought some food from home, educated on proper foods to bring with patients diet.  Bowel status: BS+, two large loose/watery incontinent BM this shift. LBM 4/10. Hold bowel meds.  : Voiding w/ incontinence, purewick in place.   Pain: Generalized body aches managed with PRN tylenol   Activity: A2/GB. Turn/repo as needed, able to weight shift. HOB @ 30 degrees. PPX: DVT - SCDs (pharm contraindicated due to bleed)  Social: Family at bedside for support and . Daughter able to stay overnight.  Plan: Hold all antiplatelet and anticoagulant medications. PLC completed today @ 1425 with family at bedside. Team aware that family would like doctor's note for work. Continue to monitor and follow POC.

## 2023-04-10 NOTE — CONSULTS
[facility] at bedside with family, one family member translated as needed for the rest. The patient was asleep throughout class.    Stroke Education Note    The following information has been reviewed with the family:    1. Warning signs of stroke    2. Calling 911 if having warning signs of stroke    3. All modifiable risk factors: hypertension, CAD, atrial fib, diabetes, hypercholesterolemia, smoking, substance abuse, diet, physical inactivity, obesity, sleep apnea.    4. Patient's risk factors for stroke which include: HTN, unhealthy diet (high salt and red meat)    5. Follow-up plan for after discharge    6. Discharge medications which include: Lisinopril    In addition, the above information was given to the family in writing as a part of the [facility] Stroke Class Handout.    Learner's response to risk factors / lifestyle modification education: Reasons to change Taking steps     Viola Ricks RN

## 2023-04-10 NOTE — PROGRESS NOTES
Care Management Follow Up    Length of Stay (days): 2    Expected Discharge Date: 04/13/23     Concerns to be Addressed: Discharge planning  Patient plan of care discussed at interdisciplinary rounds: Yes    Anticipated Discharge Disposition:  [facility]     Anticipated Discharge Services:  Therapies  Anticipated Discharge DME:  TBD    Patient/family educated on Medicare website which has current facility and service quality ratings:  [facility] already following  Education Provided on the Discharge Plan:  Yes  Patient/Family in Agreement with the Plan:  Yes    Referrals Placed by CM/SW:  [facility]  Private pay costs discussed: Not applicable    Additional Information:  SW spoke with  [facility] liaison who identified that Pt was being followed for [facility] and that updated therapy notes will help with determining appropriateness for placement. Pt remains on IV labetelol. SW will continue to follow for safe discharge planning and placement.     ________________    CHAD Balbuena, LICSW  6D   [facility]  Phone: [phone]  Pager: [phone]  Fax: [phone]

## 2023-04-10 NOTE — PLAN OF CARE
Status: Admitted for acute intracranial hemorrhage (ICH) of left basal ganglia. Mechanism of this ICH is thought to be poorly controlled HTN. Hx: HTN, chronic anemia, and prior Bell's palsy.   Vitals: VSS on RA ex/ HTN within 160 mara, continuous pulse ox in place. [facility]  Neuros: Hmong speaking, daughter translating at bedside. At 0000 [facility] orientation as pt. Was not speaking but was nodding yes/no appropriately to some ?'s. Arousing to voice/touch. At 0400 was Ox3, d/o to situation. R. Droop. R. Facial/UE/LE numbness. RUE/RLE 0-1/5, withdraws. At 0400 was able to lift R. heel off of bed. LUE/LLE 4-5/5.   IV: L. PIV infusing plasmalyte at 50mL/hr. R. PIV, SL   Labs/Electrolytes: WNL  Resp/trach: LS clear  Diet: Puree w/ thins. Takes pills crushed in applesauce.   Bowel status: No BM this shift. LBM PTA. Per pt. Not passing gas. BS+. Taking scheduled senna and miralax   : Voiding w/ incontinence, purewick in place.   Pain: Generalized body aches managed with PRN tylenol   Activity: Ax1-2, GB. Turn/repo. HOB 30 degrees. PPX: DVT - SCDs (pharm contraindicated due to bleed)  Social: Family at bedside for support and .   Plan: Hold all antiplatelet and anticoagulant medications. PLC ordered, not scheduled. Continue with current POC.

## 2023-04-10 NOTE — PLAN OF CARE
Status: Admitted for acute intracranial hemorrhage (ICH) of left basal ganglia. Mechanism of this ICH is thought to be poorly controlled HTN. Hx: HTN, chronic anemia, and prior Bell's palsy.   Vitals: VSS on RA, pt was HTN prior shift.   Neuros: A&Ox3, disoriented to situation. Needs choices. R sided weakness and numbness, R droop. RUE 1/5, RLE 2/5, L side 5.  IV: PIV SL   Labs/Electrolytes: BG ACHS  Resp/trach: WNL  Diet: Puree w/ thins. Takes pills crushed in applesauce.   Bowel status: No BM this shift.   : Voiding w/ incontinence, purewick in place.   Pain: Mild body aches, PRN tylenol given.   Activity: Ax1-2, GB  Social: Family at bedside for support and .   Plan: Continue with current POC.

## 2023-04-10 NOTE — PROVIDER NOTIFICATION
Huey Osman. Just wanted to clarify neuro status. Pt. not answering orientation ?'s but nodding yes/no appropriately. Not following all commands. RUE/RLE 0-1/5. Thanks! [id number]

## 2023-04-10 NOTE — PROGRESS NOTES
[facility]    Stroke Progress Note    Interval EventsNo acute events overnight. Patient is able to mobilize right lower extremity more keita yesterday. Patients troponin has been trending up since admission. ordered repeat troponin was 84.  Yesterday's troponin value was 52.  EKG was obtained which did not show any ischemic changes.  Will trend troponins until it peaks and get repeat EKG if patient has any symptoms of chest pain or shortness of breath.    HPI Summary  Ms. Mercy Osman is a 52 year old woman with a past medical history of HTN (on amlodipine-benazepril 10-20), history of prior Bell's Palsy admitted on 04/08/23 as a transfer from [facility] for intracranial hemorrhage.     Per chart review she presented 4/7/23 to [facility] ED via EMS after noted right sided weakness and facial droop. Last known well was 4/7 at 23:47. Per EHR review Ms. Osman was walking downstairs and fell due to sudden onset of right arm and leg weakness. She did not have obvious head trauma and she is not currently on anticoagulation. Stroke code activated upon arrival (see fellow note for full details). She was found to have a left basal ganglia hemorrhage. BP on arrival  To /115. Noted to be hypokalemic to 2.6, repleted. Started on a nicardipine gtt.    Stroke Evaluation Summarized    MRI/Head CT  CT head 4/8/2023 12:31 AM-  1. 2.7 x 1.4 x 2.5 cm acute hemorrhage in the left thalamus/left internal capsule with involvement of the left basal ganglia. No intraventricular extension. This is typical of a hypertensive bleed.  2. Slight left to right bowing of the midline structures.   CT head 4/8/2023 9:48 AM-  Stable intraparenchymal hemorrhage centered at the left  thalamus with associated mass effect upon the left lateral ventricle.  No new intracranial hemorrhage or other acute intracranial findings.  CT head 4/28/2023 9:09 PM-  Unchanged intraparenchymal hemorrhage  centered in the left  thalamus with associated mass effect. No new hemorrhage or acute  intracranial finding.   Intracranial Vasculature HEAD CTA:  ANTERIOR CIRCULATION: Scattered atheromatous disease. No stenosis/occlusion, aneurysm, or high flow vascular malformation. Fetal origin of the right posterior cerebral artery from the anterior circulation.     POSTERIOR CIRCULATION: No stenosis/occlusion, aneurysm, or high flow vascular malformation. Balanced vertebral arteries supply a normal basilar artery.      DURAL VENOUS SINUSES: Not well evaluated on a technical basis   Cervical Vasculature NECK CTA:  RIGHT CAROTID: No measurable stenosis or dissection.     LEFT CAROTID: No measurable stenosis or dissection.     VERTEBRAL ARTERIES: No focal stenosis or dissection. Balanced vertebral arteries.     AORTIC ARCH: Classic aortic arch anatomy with no significant stenosis at the origin of the great vess     Echocardiogram Left ventricular function is decreased. The ejection fraction is 50-55%  (borderline). Mild to moderate concentric wall thickening consistent with left  ventricular hypertrophy is present.  Global right ventricular function is normal.  No significant valvular abnormalities present.  Estimated mean right atrial pressure is normal.  Ascending aorta dilated at 4.4 cm.  Trivial pericardial effusion is present.   EKG/Telemetry Sinus rhythm with sinus arrhythmia   Voltage criteria for left ventricular hypertrophy ( R in aVL , Sokolow-Jackson , Stockton product )   ST & T wave abnormality, consider lateral ischemia   Prolonged QT   Abnormal ECG    Other Testing Not Applicable      LDL  4/8/2023: 142 mg/dL   A1C  4/8/2023: 6.2 %   Troponin 4/10/2023: 84 ng/L       Impression   #Acute hemorrhage in the left thalamus/left internal capsule with involvement of the left basal ganglia.   Ms. Mercy Osman is a 52 year old woman with a past medical history of HTN admitted on 04/08/23 for spontaneous ICH, likely hypertensive  in etiology. She presented to [facility] after she fell due to sudden onset of right-sided weakness.     Plan  #Spontaneous left thalamic intracranial hemorrhage, likely secondary to uncontrolled hypertension, Stable  #Vasogenic cerebral edema secondary to ICH  - ICH Score at admission: 0  - Hold all antiplatelet and anticoagulant medications  - holding statin for now.   - Neurochecks Q4H  - Systolic BP Goal: < 160  - Labetalol and hydralazine PRN available   - Avoid hypotonic IV fluids  - Head of bed elevated 30 degrees   - PT/OT/SLP when appropriate     #HTN  - Cardiac monitoring  - SBP goal < 160 mmHg   - Labetalol and hydralazine available   - Hold PTA amlodipine-benazepril  - Lisinopril 10 mg daily     #Troponin elevation  - Trend 32 - 40 - 45 - 50 - 51 - 84  - EKG without obvious acute ischemic changes   - Echocardiogram WNL  - Trending troponin's until plateaus Q6h.  - iIf patient has symptoms will consider  Another repeat EKG    #Hypokalemia  #Hyperchloridemia   - Daily BMP  - IV fluids: Plasmalyte 50 ml/hour   - Electrolyte replacement protocol    #Pre-diabetes   - Hgb A1c 6.2% 4/8/2023   - TSH pending   - Monitor glucose levels    #Anemia, chronic   - Daily CBC  - Hgb goal >7, plt goal >50k  - Transfuse to meet Hgb and plt goals    Patient Follow-up            The patient was discussed with The Stroke Staff is Dr. Castellano.    Glen Mai MD  Neurology Resident PGY2  ______________________________________________________    Clinically Significant Risk Factors        # Hypokalemia: Lowest K = 3.2 mmol/L in last 2 days, will replace as needed                           Medications   Scheduled Meds    insulin aspart  1-7 Units Subcutaneous TID AC     insulin aspart  1-5 Units Subcutaneous At Bedtime     lisinopril  10 mg Oral or Feeding Tube Daily     magnesium oxide  400 mg Oral Q4H     polyethylene glycol  17 g Oral or Feeding Tube Daily     senna-docusate  2 tablet Oral or Feeding Tube QPM        Infusion Meds    Plasma-Lyte A 50 mL/hr at 04/10/23 0454       PRN Meds  acetaminophen **OR** [DISCONTINUED] acetaminophen **OR** acetaminophen, glucose **OR** dextrose **OR** glucagon, hydrALAZINE, labetalol, ondansetron, prochlorperazine       PHYSICAL EXAMINATION  Temp:  [97.6  F (36.4  C)-99.3  F (37.4  C)] 98.6  F (37  C)  Pulse:  [66-81] 80  Resp:  [16-18] 16  BP: (126-169)/() 150/107  SpO2:  [96 %-100 %] 100 %      General Exam  General:  patient lying in bed without any acute distress    HEENT:  normocephalic/atraumatic  Cardio:  RRR  Pulmonary:  no respiratory distress  Abdomen:  soft  Extremities:  no edema  Skin:  intact     Neuro Exam  Mental status: Awake, alert, attentive, speech not always understandable per family. Follows commands when commands are asked by family in native language.   Cranial nerves: PERRL, conjugate gaze, EOMI, right lower facial droop, unable to shrug right shoulder.    Motor: Normal bulk and tone. No abnormal movements. Right upper extremity no movement, right lower extremity weak with mild drift.    Sensory: Appears to be intact.   Coordination: Deferred.    Gait: Deferred.    Stroke Scales    NIHSS  1a. Level of Consciousness 0-->Alert, keenly responsive   1b. LOC Questions 0-->Answers both questions correctly   1c. LOC Commands 0-->Performs both tasks correctly   2.   Best Gaze 0-->Normal   3.   Visual 0-->No visual loss   4.   Facial Palsy 1-->Minor paralysis (flattened nasolabial fold, asymmetry on smiling)   5a. Motor Arm, Left 0-->No drift, limb holds 90 (or 45) degrees for full 10 secs   5b. Motor Arm, Right 3-->No effort against gravity, limb falls   6a. Motor Leg, Left 0-->No drift, leg holds 30 degree position for full 5 secs   6b. Motor Leg, right 1-->Drift, leg falls by the end of the 5-sec period but does not hit bed   7.   Limb Ataxia 0-->Absent   8.   Sensory 1-->Mild-to-moderate sensory loss, patient feels pinprick is less sharp or is dull on the  affected side, or there is a loss of superficial pain with pinprick, but patient is aware of being touched   9.   Best Language 0-->No aphasia, normal   10. Dysarthria 1-->Mild-to-moderate dysarthria, patient slurs at least some words and, at worst, can be understood with some difficulty   11. Extinction and Inattention  0-->No abnormality   Total 7 (04/10/23 1038)       Modified [place] Score (Pre-morbid)  0-No deficits    Imaging  I personally reviewed all imaging; relevant findings per HPI.     Lab Results Data   CBC  Recent Labs   Lab 04/10/23  0540 04/09/23  0025 04/08/23  0032   WBC 7.2 7.0 6.7   RBC 4.74 5.01 4.51   HGB 12.0 12.5 11.5*   HCT 39.4 41.4 36.9    316 287     Basic Metabolic Panel    Recent Labs   Lab 04/10/23  0931 04/10/23  0730 04/10/23  0540 04/10/23  0253 04/09/23  0942 04/09/23  0327 04/09/23  0025 04/08/23  1259 04/08/23  0842   NA  --   --  142  --   --   --  141  --  140   POTASSIUM 4.6  --  3.2*  --   --  3.8 3.4   < > 2.7*   CHLORIDE  --   --  108*  --   --   --  108*  --  105   CO2  --   --  21*  --   --   --  20*  --  20*   BUN  --   --  6.7  --   --   --  3.1*  --  4.6*   CR  --   --  0.73  --   --   --  0.64  --  0.58   GLC  --  96 105* 88   < >  --  115*   < > 111*   SALAZAR  --   --  9.1  --   --   --  9.4  --  9.4    < > = values in this interval not displayed.     Liver Panel  No results for input(s): PROTTOTAL, ALBUMIN, BILITOTAL, ALKPHOS, AST, ALT, BILIDIRECT in the last 168 hours.  INR    Recent Labs   Lab Test 04/08/23  0032   INR 0.99      Lipid Profile    Recent Labs   Lab Test 04/08/23  0842   *     A1C    Recent Labs   Lab Test 04/08/23  0842   A1C 6.2*     Troponin    Recent Labs   Lab 04/10/23  0931 04/09/23  0327 04/09/23  0025   CTROPT 84* 52* 51*          Data

## 2023-04-11 ENCOUNTER — APPOINTMENT (OUTPATIENT)
Dept: SPEECH THERAPY | Facility: CLINIC | Age: 53
End: 2023-04-11
Attending: PSYCHIATRY & NEUROLOGY
Payer: COMMERCIAL

## 2023-04-11 ENCOUNTER — APPOINTMENT (OUTPATIENT)
Dept: CT IMAGING | Facility: CLINIC | Age: 53
End: 2023-04-11
Attending: PSYCHIATRY & NEUROLOGY
Payer: COMMERCIAL

## 2023-04-11 ENCOUNTER — APPOINTMENT (OUTPATIENT)
Dept: PHYSICAL THERAPY | Facility: CLINIC | Age: 53
End: 2023-04-11
Attending: STUDENT IN AN ORGANIZED HEALTH CARE EDUCATION/TRAINING PROGRAM
Payer: COMMERCIAL

## 2023-04-11 LAB
ABO/RH(D): NORMAL
ANION GAP SERPL CALCULATED.3IONS-SCNC: 15 MMOL/L (ref 7–15)
ANTIBODY SCREEN: NEGATIVE
ATRIAL RATE - MUSE: 74 BPM
BUN SERPL-MCNC: 6.8 MG/DL (ref 6–20)
CALCIUM SERPL-MCNC: 9.3 MG/DL (ref 8.6–10)
CHLORIDE SERPL-SCNC: 106 MMOL/L (ref 98–107)
CREAT SERPL-MCNC: 0.7 MG/DL (ref 0.51–0.95)
DEPRECATED HCO3 PLAS-SCNC: 21 MMOL/L (ref 22–29)
DIASTOLIC BLOOD PRESSURE - MUSE: NORMAL MMHG
ERYTHROCYTE [DISTWIDTH] IN BLOOD BY AUTOMATED COUNT: 16.8 % (ref 10–15)
GFR SERPL CREATININE-BSD FRML MDRD: >90 ML/MIN/1.73M2
GLUCOSE BLDC GLUCOMTR-MCNC: 104 MG/DL (ref 70–99)
GLUCOSE BLDC GLUCOMTR-MCNC: 84 MG/DL (ref 70–99)
GLUCOSE BLDC GLUCOMTR-MCNC: 91 MG/DL (ref 70–99)
GLUCOSE BLDC GLUCOMTR-MCNC: 97 MG/DL (ref 70–99)
GLUCOSE SERPL-MCNC: 88 MG/DL (ref 70–99)
HCT VFR BLD AUTO: 40.1 % (ref 35–47)
HGB BLD-MCNC: 12.2 G/DL (ref 11.7–15.7)
INTERPRETATION ECG - MUSE: NORMAL
MAGNESIUM SERPL-MCNC: 2 MG/DL (ref 1.7–2.3)
MCH RBC QN AUTO: 25.1 PG (ref 26.5–33)
MCHC RBC AUTO-ENTMCNC: 30.4 G/DL (ref 31.5–36.5)
MCV RBC AUTO: 83 FL (ref 78–100)
P AXIS - MUSE: 23 DEGREES
PHOSPHATE SERPL-MCNC: 4.4 MG/DL (ref 2.5–4.5)
PLATELET # BLD AUTO: 289 10E3/UL (ref 150–450)
POTASSIUM SERPL-SCNC: 3.4 MMOL/L (ref 3.4–5.3)
POTASSIUM SERPL-SCNC: 4.2 MMOL/L (ref 3.4–5.3)
PR INTERVAL - MUSE: 152 MS
QRS DURATION - MUSE: 88 MS
QT - MUSE: 446 MS
QTC - MUSE: 495 MS
R AXIS - MUSE: -5 DEGREES
RBC # BLD AUTO: 4.86 10E6/UL (ref 3.8–5.2)
SODIUM SERPL-SCNC: 142 MMOL/L (ref 136–145)
SPECIMEN EXPIRATION DATE: NORMAL
SYSTOLIC BLOOD PRESSURE - MUSE: NORMAL MMHG
T AXIS - MUSE: 52 DEGREES
TROPONIN T SERPL HS-MCNC: 111 NG/L
TROPONIN T SERPL HS-MCNC: 88 NG/L
TROPONIN T SERPL HS-MCNC: 95 NG/L
VENTRICULAR RATE- MUSE: 74 BPM
WBC # BLD AUTO: 6.4 10E3/UL (ref 4–11)

## 2023-04-11 PROCEDURE — 97161 PT EVAL LOW COMPLEX 20 MIN: CPT | Mod: GP

## 2023-04-11 PROCEDURE — 250N000013 HC RX MED GY IP 250 OP 250 PS 637

## 2023-04-11 PROCEDURE — 120N000002 HC R&B MED SURG/OB UMMC

## 2023-04-11 PROCEDURE — 86850 RBC ANTIBODY SCREEN: CPT | Performed by: NURSE PRACTITIONER

## 2023-04-11 PROCEDURE — 84484 ASSAY OF TROPONIN QUANT: CPT

## 2023-04-11 PROCEDURE — 36415 COLL VENOUS BLD VENIPUNCTURE: CPT | Performed by: PSYCHIATRY & NEUROLOGY

## 2023-04-11 PROCEDURE — 97530 THERAPEUTIC ACTIVITIES: CPT | Mod: GP

## 2023-04-11 PROCEDURE — 85027 COMPLETE CBC AUTOMATED: CPT | Performed by: NURSE PRACTITIONER

## 2023-04-11 PROCEDURE — 99232 SBSQ HOSP IP/OBS MODERATE 35: CPT | Mod: GC | Performed by: PSYCHIATRY & NEUROLOGY

## 2023-04-11 PROCEDURE — 84484 ASSAY OF TROPONIN QUANT: CPT | Performed by: STUDENT IN AN ORGANIZED HEALTH CARE EDUCATION/TRAINING PROGRAM

## 2023-04-11 PROCEDURE — 250N000011 HC RX IP 250 OP 636: Performed by: NURSE PRACTITIONER

## 2023-04-11 PROCEDURE — 84100 ASSAY OF PHOSPHORUS: CPT | Performed by: NURSE PRACTITIONER

## 2023-04-11 PROCEDURE — 83735 ASSAY OF MAGNESIUM: CPT | Performed by: NURSE PRACTITIONER

## 2023-04-11 PROCEDURE — 70450 CT HEAD/BRAIN W/O DYE: CPT | Mod: 26 | Performed by: RADIOLOGY

## 2023-04-11 PROCEDURE — 92526 ORAL FUNCTION THERAPY: CPT | Mod: GN

## 2023-04-11 PROCEDURE — 84132 ASSAY OF SERUM POTASSIUM: CPT | Performed by: PSYCHIATRY & NEUROLOGY

## 2023-04-11 PROCEDURE — 250N000009 HC RX 250: Performed by: NURSE PRACTITIONER

## 2023-04-11 PROCEDURE — 250N000013 HC RX MED GY IP 250 OP 250 PS 637: Performed by: PSYCHIATRY & NEUROLOGY

## 2023-04-11 PROCEDURE — 36415 COLL VENOUS BLD VENIPUNCTURE: CPT

## 2023-04-11 PROCEDURE — 70450 CT HEAD/BRAIN W/O DYE: CPT

## 2023-04-11 PROCEDURE — 36415 COLL VENOUS BLD VENIPUNCTURE: CPT | Performed by: NURSE PRACTITIONER

## 2023-04-11 PROCEDURE — 82310 ASSAY OF CALCIUM: CPT | Performed by: NURSE PRACTITIONER

## 2023-04-11 RX ORDER — ATORVASTATIN CALCIUM 20 MG/1
20 TABLET, FILM COATED ORAL EVERY EVENING
Status: DISCONTINUED | OUTPATIENT
Start: 2023-04-11 | End: 2023-04-14 | Stop reason: HOSPADM

## 2023-04-11 RX ORDER — MAGNESIUM OXIDE 400 MG/1
400 TABLET ORAL EVERY 4 HOURS
Status: COMPLETED | OUTPATIENT
Start: 2023-04-11 | End: 2023-04-11

## 2023-04-11 RX ORDER — POTASSIUM CHLORIDE 750 MG/1
40 TABLET, EXTENDED RELEASE ORAL ONCE
Status: COMPLETED | OUTPATIENT
Start: 2023-04-11 | End: 2023-04-11

## 2023-04-11 RX ORDER — CARBOXYMETHYLCELLULOSE SODIUM 5 MG/ML
1 SOLUTION/ DROPS OPHTHALMIC
Status: DISCONTINUED | OUTPATIENT
Start: 2023-04-11 | End: 2023-04-14 | Stop reason: HOSPADM

## 2023-04-11 RX ORDER — AMLODIPINE BESYLATE 10 MG/1
10 TABLET ORAL DAILY
Status: DISCONTINUED | OUTPATIENT
Start: 2023-04-11 | End: 2023-04-14 | Stop reason: HOSPADM

## 2023-04-11 RX ADMIN — SODIUM CHLORIDE, SODIUM GLUCONATE, SODIUM ACETATE, POTASSIUM CHLORIDE AND MAGNESIUM CHLORIDE: 526; 502; 368; 37; 30 INJECTION, SOLUTION INTRAVENOUS at 14:18

## 2023-04-11 RX ADMIN — ATORVASTATIN CALCIUM 20 MG: 20 TABLET, FILM COATED ORAL at 20:53

## 2023-04-11 RX ADMIN — LISINOPRIL 20 MG: 20 TABLET ORAL at 08:22

## 2023-04-11 RX ADMIN — LABETALOL HYDROCHLORIDE 10 MG: 5 INJECTION, SOLUTION INTRAVENOUS at 04:23

## 2023-04-11 RX ADMIN — AMLODIPINE BESYLATE 10 MG: 10 TABLET ORAL at 11:59

## 2023-04-11 RX ADMIN — Medication 400 MG: at 13:16

## 2023-04-11 RX ADMIN — Medication 400 MG: at 17:29

## 2023-04-11 RX ADMIN — POTASSIUM CHLORIDE 40 MEQ: 750 TABLET, EXTENDED RELEASE ORAL at 13:15

## 2023-04-11 ASSESSMENT — ACTIVITIES OF DAILY LIVING (ADL)
ADLS_ACUITY_SCORE: 32
ADLS_ACUITY_SCORE: 29
ADLS_ACUITY_SCORE: 29
ADLS_ACUITY_SCORE: 32
ADLS_ACUITY_SCORE: 32
ADLS_ACUITY_SCORE: 29
ADLS_ACUITY_SCORE: 29
ADLS_ACUITY_SCORE: 32
ADLS_ACUITY_SCORE: 29
ADLS_ACUITY_SCORE: 29

## 2023-04-11 NOTE — PROGRESS NOTES
"   04/11/23 1500   Appointment Info   Signing Clinician's Name / Credentials (PT) Evette Sky, SPT   Student Supervision Direct supervision provided   Rehab Comments (PT) monitor BP - HTN       Present no  (family with preference to interpret)   Language Hmong   Living Environment   People in Home child(riaz), adult;spouse  (2 daughters, 1 son)   Current Living Arrangements house   Home Accessibility stairs to enter home;stairs within home   Number of Stairs, Main Entrance 1   Stair Railings, Main Entrance none   Number of Stairs, Within Home, Primary greater than 10 stairs  (12)   Stair Railings, Within Home, Primary railing on right side (ascending)   Transportation Anticipated family or friend will provide   Living Environment Comments Pt lives with spouse and adult children (son and 2 daughters) in home with 1 BENJAMIN and no rail, 12 steps to basement for laundry but all needs can be met on main level.   Self-Care   Usual Activity Tolerance good   Current Activity Tolerance poor   Regular Exercise Yes   Activity/Exercise Type walking  (walking 1-2 miles at [place] during warm weather)   Exercise Amount/Frequency 3-5 times/wk   Equipment Currently Used at Home none   Fall history within last six months yes   Number of times patient has fallen within last six months 1   Activity/Exercise/Self-Care Comment Pt denies previous falls, however daughter reports multiple instances of dizziness/\"blacking out\" in which pt has fallen once. After fall, pt took 20-30 min to regain consciousness.   General Information   Onset of Illness/Injury or Date of Surgery 04/08/23   Referring Physician Julisa Barker MD   Patient/Family Therapy Goals Statement (PT) return home   Pertinent History of Current Problem (include personal factors and/or comorbidities that impact the POC) Mercy Osman is a 53-year-old female, with a past medical history that includes hypertension, chronic anemia, and prior Bell's palsy, " who was admitted on 4/8 after she developed acute-onset weakness of the right hemibody.  She was found to have acute intracranial hemorrhage (ICH) of the left basal ganglia.  Mechanism of this ICH is thought to be poorly controlled hypertension.   Existing Precautions/Restrictions fall   Cognition   Affect/Mental Status (Cognition) unable/difficult to assess;low arousal/lethargic   Orientation Status (Cognition) oriented to;person;place   Follows Commands (Cognition) WFL;follows one-step commands;over 90% accuracy   Pain Assessment   Patient Currently in Pain No   Integumentary/Edema   Integumentary/Edema no deficits were identifed   Posture    Posture Forward head position;Protracted shoulders   Range of Motion (ROM)   Range of Motion ROM deficits secondary to weakness   ROM Comment RLE<LLE   Strength (Manual Muscle Testing)   Strength Comments RLE - Supine: pt performs SLR ~1 inch, can initiate heel slide with movement ~1 inch. DF limited ROM. Minimal weightbearing and advancement of RLE during transfer.   Bed Mobility   Comment, (Bed Mobility) sup>sit with modAx2 at LEs and trunk   Transfers   Comment, (Transfers) STS no AD with minAx2   Gait/Stairs (Locomotion)   Comment, (Gait/Stairs) NT d/t safety concerns   Balance   Balance other (describe)   Sitting Balance: Static fair balance   Sitting Balance: Dynamic fair balance   Standing Balance: Static poor balance   Standing Balance: Dynamic poor balance   Balance Quick Add Sitting balance: Static;Sitting balance: dynamic;Standing balance: static;Standing balance: dynamic   Sensory Examination   Sensory Perception Comments Pt reports numbness from R hip to toes   Coordination   Coordination Comments NT d/t weakness   Muscle Tone   Muscle Tone Comments flacid RUE   Clinical Impression   Criteria for Skilled Therapeutic Intervention Yes, treatment indicated   PT Diagnosis (PT) impaired mobility   Influenced by the following impairments decreased strength, balance,  sensation, activity tolerance   Functional limitations due to impairments impaired abilityt to perform bed mobility, transfers, gait, and stairs   Clinical Presentation (PT Evaluation Complexity) Evolving/Changing   Clinical Presentation Rationale Cardiac concerns, ongoing HTN. Clinical judgment   Clinical Decision Making (Complexity) low complexity   Planned Therapy Interventions (PT) balance training;bed mobility training;gait training;home exercise program;motor coordination training;neuromuscular re-education;patient/family education;postural re-education;orthotic fitting/training;ROM (range of motion);stair training;strengthening;stretching;transfer training;progressive activity/exercise;risk factor education   Anticipated Equipment Needs at Discharge (PT) other (see comments)  (Pending pt progress during LOS)   Risk & Benefits of therapy have been explained evaluation/treatment results reviewed;care plan/treatment goals reviewed;risks/benefits reviewed;current/potential barriers reviewed;participants voiced agreement with care plan;participants included;spouse/significant other;patient;daughter;son   PT Total Evaluation Time   PT Eval, Low Complexity Minutes (98663) 15   Physical Therapy Goals   PT Frequency 6x/week   PT Predicted Duration/Target Date for Goal Attainment 06/10/23   PT Goals Bed Mobility;Transfers;Gait;Stairs   PT: Bed Mobility Supervision/stand-by assist;Rolling;Supine to/from sit   PT: Transfers Supervision/stand-by assist;Bed to/from chair;Sit to/from stand  (LRAD)   PT: Gait Supervision/stand-by assist;Greater than 200 feet;Assistive device  (LRAD)   PT: Stairs Minimal assist;1 stair  (no rail)   PT Discharge Planning   PT Plan progress standing endurance, transfer practice, pre-gait   PT Discharge Recommendation (DC Rec) [facility]-Motivated patient will benefit from intensive, interdisciplinary therapy.  Anticipate will be able to tolerate 3 hours of therapy per day   PT Rationale  for DC Rec Pt significantly below functional baseline. Pt has 24/7 support from daughter who lives with her. Daughter expressed interest in becoming pt's PCA. Pt is motivated, has multidisciplinary needs, and will likely tolerate 3 hours of therapy/day.   PT Brief overview of current status OH lift to recliner/commode; encourage upright sitting in recliner as tolerated   Total Session Time   Timed Code Treatment Minutes 8   Total Session Time (sum of timed and untimed services) 23

## 2023-04-11 NOTE — PLAN OF CARE
Status: Admitted for acute intracranial hemorrhage (ICH) of left basal ganglia. Mechanism of this ICH is thought to be poorly controlled HTN.   Hx: HTN, chronic anemia, and prior Bell's palsy.   Vitals: HTN, labetalol given x1 to keep SBP <160. On RA. CCM.   Neuros: Disorientated to time, with choices. Hmong speaking - daughter at bedside to help translate. Flat, withdrawn. Slow, whispered speech, WFD. Slight R droop. R side numbness to face. L side 5/5. RUE 0/5, RLE 3/5. DTA sensation on extremities.   IV: 1 PIV SL. 1 PIV infusing plasmalyte @ 50mL/hr.   Labs/Electrolytes: Elevated troponin = 113 - MD aware. Troponin redraw in AM, troponin q6hr.    Resp/trach: LSC.   Diet: Level 4 pureed with thins. Takes pills whole in applesauce.   Bowel status: LBM 4/10, loose. Hold bowel meds.   : Incontinence. Purewick in place.   Skin: Bruising on arms.   Pain: C/o headache. PRN tylenol offered, pt declined. Resolved when reassessed.   Activity: A2. Turn and repo q2hr. HOB @30 degrees. No SCDs d/t bleed and hx of DVT. PT   Social: Daughter (Kayli) at bedside, able to stay overnight.   Plan: PLC completed 4/10. Hold antiplatelet and anticoagulant medications. Team aware that family would like doctor's note for work. OT/PT and speech consults today.

## 2023-04-11 NOTE — PROGRESS NOTES
[facility]    Stroke Progress Note    Interval EventsNo acute events overnight. Troponin peaked at 113.  Repeat this AM at 111.  Ordered another check for 6 hrs from AM labs.  Pt continues to deny CP, SOB, nausea/vomiting.  Restarted amlodipine and statin today.     HPI Summary  Ms. Mercy Osman is a 52 year old woman with a past medical history of HTN (on amlodipine-benazepril 10-20), history of prior Bell's Palsy admitted on 04/08/23 as a transfer from [facility] for intracranial hemorrhage.     Per chart review she presented 4/7/23 to [facility] ED via EMS after noted right sided weakness and facial droop. Last known well was 4/7 at 23:47. Per EHR review Ms. Osman was walking downstairs and fell due to sudden onset of right arm and leg weakness. She did not have obvious head trauma and she is not currently on anticoagulation. Stroke code activated upon arrival (see fellow note for full details). She was found to have a left basal ganglia hemorrhage. BP on arrival  To /115. Noted to be hypokalemic to 2.6, repleted. Started on a nicardipine gtt which has been off since 4/8.  Currently tolerating lisinopril for pressure management.    Stroke Evaluation Summarized    MRI/Head CT  CT head 4/8/2023 12:31 AM-  1. 2.7 x 1.4 x 2.5 cm acute hemorrhage in the left thalamus/left internal capsule with involvement of the left basal ganglia. No intraventricular extension. This is typical of a hypertensive bleed.  2. Slight left to right bowing of the midline structures.   CT head 4/8/2023 9:48 AM-  Stable intraparenchymal hemorrhage centered at the left  thalamus with associated mass effect upon the left lateral ventricle.  No new intracranial hemorrhage or other acute intracranial findings.  CT head 4/28/2023 9:09 PM-  Unchanged intraparenchymal hemorrhage centered in the left  thalamus with associated mass effect. No new hemorrhage or acute  intracranial  finding.   Intracranial Vasculature HEAD CTA:  ANTERIOR CIRCULATION: Scattered atheromatous disease. No stenosis/occlusion, aneurysm, or high flow vascular malformation. Fetal origin of the right posterior cerebral artery from the anterior circulation.     POSTERIOR CIRCULATION: No stenosis/occlusion, aneurysm, or high flow vascular malformation. Balanced vertebral arteries supply a normal basilar artery.      DURAL VENOUS SINUSES: Not well evaluated on a technical basis   Cervical Vasculature NECK CTA:  RIGHT CAROTID: No measurable stenosis or dissection.     LEFT CAROTID: No measurable stenosis or dissection.     VERTEBRAL ARTERIES: No focal stenosis or dissection. Balanced vertebral arteries.     AORTIC ARCH: Classic aortic arch anatomy with no significant stenosis at the origin of the great vess     Echocardiogram Left ventricular function is decreased. The ejection fraction is 50-55%  (borderline). Mild to moderate concentric wall thickening consistent with left  ventricular hypertrophy is present.  Global right ventricular function is normal.  No significant valvular abnormalities present.  Estimated mean right atrial pressure is normal.  Ascending aorta dilated at 4.4 cm.  Trivial pericardial effusion is present.   EKG/Telemetry Sinus rhythm with sinus arrhythmia   Voltage criteria for left ventricular hypertrophy ( R in aVL , Sokolow-Jackson , [place] product )   ST & T wave abnormality, consider lateral ischemia   Prolonged QT   Abnormal ECG    Other Testing Not Applicable      LDL  4/8/2023: 142 mg/dL   A1C  4/8/2023: 6.2 %   Troponin 4/11/2023: 111 ng/L       Impression   #Acute hemorrhage in the left thalamus/left internal capsule with involvement of the left basal ganglia.   Ms. Mercy Osman is a 52 year old woman with a past medical history of HTN admitted on 04/08/23 for spontaneous ICH, likely hypertensive in etiology (location, HTN during hospitalization). She presented to [facility] after she fell due  to sudden onset of right-sided weakness.     Plan  #Spontaneous left thalamic intracranial hemorrhage, likely secondary to uncontrolled hypertension, Stable  #Vasogenic cerebral edema secondary to ICH  - ICH Score at admission: 0  - Hold all antiplatelet and anticoagulant medications  - Was initially holding statin, however will initiate atorvastatin 20mg daily (in setting of IPH) today  - Neurochecks Q4H  - Systolic BP Goal: < 180  - Labetalol and hydralazine PRN available   - Avoid hypotonic IV fluids  - Head of bed elevated 30 degrees   - PT --> awaiting recommendations  - OT --> recommend ARU  - SLP --> advance diet to regular diet with thin liquids     #HTN  - Cardiac monitoring  - SBP goal < 160 mmHg   - Labetalol and hydralazine available   - Hold PTA amlodipine-benazepril, further medication recommendations below  - Lisinopril 20mg daily  - Will initiate amlodipine 10mg daily today    #Troponin elevation  - Trend 32 - 40 - 45 - 50 - 51 - 84 - 100 - 113 - 111; seems to have peaked on the AM of 4/11, will repeat study in another 6 hours.  - EKG without obvious acute ischemic changes   - Echocardiogram WNL  - Trending troponin's until plateaus Q6h.  - iIf patient has symptoms will consider  Another repeat EKG    #Hypokalemia, resolved, received replacement  #Hyperchloridemia, resolved with adequate hydration   - Daily BMP  - IV fluids: Plasmalyte 50 ml/hour   - Electrolyte replacement protocol    #Pre-diabetes   - Hgb A1c 6.2% 4/8/2023   - TSH pending   - Monitor glucose levels    #Anemia, chronic   - Daily CBC  - Hgb goal >7, plt goal >50k  - Transfuse to meet Hgb and plt goals    Patient Follow-up    Pending disposition/work up    The patient was discussed with The Stroke Staff is Dr. Castellano.    Adryan Reed DO  Resident Physician, PGY-2  Department of Neurology    Dragon disclaimer: This documentation was completed with the aid of dictation software.  Please note that there may be some inconsistencies  due to software errors.  Errors are corrected in real time, however if there is a remaining error, please do not hesitate to reach out for clarification.     ______________________________________________________    Clinically Significant Risk Factors        # Hypokalemia: Lowest K = 3.2 mmol/L in last 2 days, will replace as needed                           Medications   Scheduled Meds    insulin aspart  1-7 Units Subcutaneous TID AC     insulin aspart  1-5 Units Subcutaneous At Bedtime     lisinopril  20 mg Oral or Feeding Tube Daily     polyethylene glycol  17 g Oral or Feeding Tube Daily     senna-docusate  2 tablet Oral or Feeding Tube QPM       Infusion Meds    Plasma-Lyte A 50 mL/hr at 04/11/23 0023       PRN Meds  acetaminophen **OR** [DISCONTINUED] acetaminophen **OR** acetaminophen, glucose **OR** dextrose **OR** glucagon, hydrALAZINE, labetalol, ondansetron, prochlorperazine       PHYSICAL EXAMINATION  Temp:  [98  F (36.7  C)-99  F (37.2  C)] 98.8  F (37.1  C)  Pulse:  [65-80] 65  Resp:  [12-19] 17  BP: (119-177)/(103-129) 164/107  SpO2:  [94 %-98 %] 94 %      General Exam  General:  patient lying in bed without any acute distress    HEENT:  normocephalic/atraumatic  Cardio:  RRR  Pulmonary:  no respiratory distress  Abdomen:  soft  Extremities:  no edema  Skin:  intact     Neuro Exam  Mental status: Awake, alert, attentive, speech not always understandable per family. Oriented to month, year, self.  Follows commands when commands are asked by family in native language.   Cranial nerves: PERRL, conjugate gaze, visual fields full on confrontational testing. EOMI, right lower facial droop.    Motor: Normal bulk and tone. No abnormal movements. Full strength in Left hemibody.  Right upper extremity some spontaneous movement, weak , right lower extremity weak with mild drift.    Sensory: Appears to be intact.   Coordination: Deferred.    Gait: Deferred.    Stroke Scales    NIHSS  1a. Level of  Consciousness 0-->Alert, keenly responsive   1b. LOC Questions 0-->Answers both questions correctly   1c. LOC Commands 0-->Performs both tasks correctly   2.   Best Gaze 0-->Normal   3.   Visual 0-->No visual loss   4.   Facial Palsy 1-->Minor paralysis (flattened nasolabial fold, asymmetry on smiling)   5a. Motor Arm, Left 0-->No drift, limb holds 90 (or 45) degrees for full 10 secs   5b. Motor Arm, Right 2-->Some effort against gravity, limb cannot get to or maintain (if cued) 90 (or 45) degrees, drifts down to bed, but has some effort against gravity   6a. Motor Leg, Left 1-->Drift, leg falls by the end of the 5-sec period but does not hit bed   6b. Motor Leg, right 0-->No drift, leg holds 30 degree position for full 5 secs   7.   Limb Ataxia 0-->Absent   8.   Sensory 1-->Mild-to-moderate sensory loss, patient feels pinprick is less sharp or is dull on the affected side, or there is a loss of superficial pain with pinprick, but patient is aware of being touched   9.   Best Language 0-->No aphasia, normal   10. Dysarthria 1-->Mild-to-moderate dysarthria, patient slurs at least some words and, at worst, can be understood with some difficulty   11. Extinction and Inattention  0-->No abnormality   Total 6 (04/11/23 0846)       Modified [place] Score (Pre-morbid)  0-No deficits    Imaging  I personally reviewed all imaging; relevant findings per HPI.     Lab Results Data   CBC  Recent Labs   Lab 04/11/23  0558 04/10/23  0540 04/09/23  0025   WBC 6.4 7.2 7.0   RBC 4.86 4.74 5.01   HGB 12.2 12.0 12.5   HCT 40.1 39.4 41.4    302 316     Basic Metabolic Panel    Recent Labs   Lab 04/11/23  0756 04/11/23  0558 04/10/23  2142 04/10/23  1146 04/10/23  0931 04/10/23  0730 04/10/23  0540 04/09/23  0327 04/09/23  0025   NA  --  142  --   --   --   --  142  --  141   POTASSIUM  --  3.4  --   --  4.6  --  3.2*   < > 3.4   CHLORIDE  --  106  --   --   --   --  108*  --  108*   CO2  --  21*  --   --   --   --  21*  --  20*    BUN  --  6.8  --   --   --   --  6.7  --  3.1*   CR  --  0.70  --   --   --   --  0.73  --  0.64   GLC 84 88 105*   < >  --    < > 105*   < > 115*   SALAZAR  --  9.3  --   --   --   --  9.1  --  9.4    < > = values in this interval not displayed.     Liver Panel  No results for input(s): PROTTOTAL, ALBUMIN, BILITOTAL, ALKPHOS, AST, ALT, BILIDIRECT in the last 168 hours.  INR    Recent Labs   Lab Test 04/08/23  0032   INR 0.99      Lipid Profile    Recent Labs   Lab Test 04/08/23  0842   *     A1C    Recent Labs   Lab Test 04/08/23  0842   A1C 6.2*     Troponin    Recent Labs   Lab 04/11/23  0558 04/10/23  2228 04/10/23  1813   CTROPT 111* 113* 100*          Data

## 2023-04-11 NOTE — PLAN OF CARE
Status: Admitted for acute intracranial hemorrhage (ICH) of left basal ganglia. Mechanism of this ICH is thought to be poorly controlled HTN.   Hx: HTN, chronic anemia, and prior Bell's palsy.    Vitals: VSS, ex HTN.   To keep SBP < 160.  On RA.  [facility]  Neuros: Hmong speaking only.  Family at bedside, involved and helping with cares, also helping to translate.  Disoriented to time.  Oriented to place with choices.  Flat. Withdrawn. Slow, whispered speech.  WFD.  R droop.  R side numbness, including R face.  L side 5/5.  RUE 0/5. RLE 3/5 (can lift left off bed).   IV: 1 PIV SL.  1 PIV infusing plasmalyte @ 50mL/hr.  Labs/Electrolytes: elevated troponin = 111 (0600).  MD aware.  Troponin redraw q6hrs.    Resp/trach: C  Diet: regular diet with thins.  Takes pills whole with applesauce.  Speech following.  Diet advanced today from pureed to regular.  Needs tray set-up and assistance with feeding.  Bowel status: LBM 4/10 loose.  Hold bowel meds.  : Incontinence.  Purewick in place.  Skin: bruising to arms  Pain: denies  Activity: A2/turn and repo Q2hrs.  HOB @ 30 degrees.   Social: Daughter (Kayli), , and son at bedside.  Able to stay overnight.   Plan: Hold antiplatelet and anticoagulant medications. Team aware that family would like notes from MD for their work.    Updates this shift: Hmong  (in person) order placed, for this afternoon.      Stroke Patients:   PLC scheduled or completed: completed 4/10  Pneumoboots in place: No SCDs d/t bleed and hx of DVT.

## 2023-04-11 NOTE — PROVIDER NOTIFICATION
"MD notified, \"Pt troponin trending up, latest results = 113. No s/sx at this time.\"    MD called back, will order [facility] troponin lab in AM for follow-up    "

## 2023-04-12 ENCOUNTER — APPOINTMENT (OUTPATIENT)
Dept: PHYSICAL THERAPY | Facility: CLINIC | Age: 53
End: 2023-04-12
Attending: PSYCHIATRY & NEUROLOGY
Payer: COMMERCIAL

## 2023-04-12 ENCOUNTER — APPOINTMENT (OUTPATIENT)
Dept: OCCUPATIONAL THERAPY | Facility: CLINIC | Age: 53
End: 2023-04-12
Attending: PSYCHIATRY & NEUROLOGY
Payer: COMMERCIAL

## 2023-04-12 ENCOUNTER — APPOINTMENT (OUTPATIENT)
Dept: SPEECH THERAPY | Facility: CLINIC | Age: 53
End: 2023-04-12
Attending: PSYCHIATRY & NEUROLOGY
Payer: COMMERCIAL

## 2023-04-12 LAB
ANION GAP SERPL CALCULATED.3IONS-SCNC: 15 MMOL/L (ref 7–15)
BUN SERPL-MCNC: 8.2 MG/DL (ref 6–20)
CALCIUM SERPL-MCNC: 9.5 MG/DL (ref 8.6–10)
CHLORIDE SERPL-SCNC: 103 MMOL/L (ref 98–107)
CREAT SERPL-MCNC: 0.66 MG/DL (ref 0.51–0.95)
DEPRECATED HCO3 PLAS-SCNC: 20 MMOL/L (ref 22–29)
ERYTHROCYTE [DISTWIDTH] IN BLOOD BY AUTOMATED COUNT: 16.7 % (ref 10–15)
GFR SERPL CREATININE-BSD FRML MDRD: >90 ML/MIN/1.73M2
GLUCOSE BLDC GLUCOMTR-MCNC: 101 MG/DL (ref 70–99)
GLUCOSE BLDC GLUCOMTR-MCNC: 130 MG/DL (ref 70–99)
GLUCOSE BLDC GLUCOMTR-MCNC: 152 MG/DL (ref 70–99)
GLUCOSE BLDC GLUCOMTR-MCNC: 84 MG/DL (ref 70–99)
GLUCOSE BLDC GLUCOMTR-MCNC: 87 MG/DL (ref 70–99)
GLUCOSE SERPL-MCNC: 93 MG/DL (ref 70–99)
HCT VFR BLD AUTO: 43.1 % (ref 35–47)
HGB BLD-MCNC: 13 G/DL (ref 11.7–15.7)
MAGNESIUM SERPL-MCNC: 2.2 MG/DL (ref 1.7–2.3)
MCH RBC QN AUTO: 24.9 PG (ref 26.5–33)
MCHC RBC AUTO-ENTMCNC: 30.2 G/DL (ref 31.5–36.5)
MCV RBC AUTO: 83 FL (ref 78–100)
PHOSPHATE SERPL-MCNC: 4.5 MG/DL (ref 2.5–4.5)
PLATELET # BLD AUTO: 318 10E3/UL (ref 150–450)
POTASSIUM SERPL-SCNC: 3.8 MMOL/L (ref 3.4–5.3)
RBC # BLD AUTO: 5.22 10E6/UL (ref 3.8–5.2)
SODIUM SERPL-SCNC: 138 MMOL/L (ref 136–145)
WBC # BLD AUTO: 8.1 10E3/UL (ref 4–11)

## 2023-04-12 PROCEDURE — 97116 GAIT TRAINING THERAPY: CPT | Mod: GP | Performed by: REHABILITATION PRACTITIONER

## 2023-04-12 PROCEDURE — 250N000013 HC RX MED GY IP 250 OP 250 PS 637: Performed by: PSYCHIATRY & NEUROLOGY

## 2023-04-12 PROCEDURE — 80048 BASIC METABOLIC PNL TOTAL CA: CPT | Performed by: NURSE PRACTITIONER

## 2023-04-12 PROCEDURE — 97530 THERAPEUTIC ACTIVITIES: CPT | Mod: GO | Performed by: OCCUPATIONAL THERAPIST

## 2023-04-12 PROCEDURE — 85027 COMPLETE CBC AUTOMATED: CPT | Performed by: NURSE PRACTITIONER

## 2023-04-12 PROCEDURE — 250N000013 HC RX MED GY IP 250 OP 250 PS 637: Performed by: STUDENT IN AN ORGANIZED HEALTH CARE EDUCATION/TRAINING PROGRAM

## 2023-04-12 PROCEDURE — 36415 COLL VENOUS BLD VENIPUNCTURE: CPT | Performed by: NURSE PRACTITIONER

## 2023-04-12 PROCEDURE — 84100 ASSAY OF PHOSPHORUS: CPT | Performed by: NURSE PRACTITIONER

## 2023-04-12 PROCEDURE — 250N000009 HC RX 250: Performed by: NURSE PRACTITIONER

## 2023-04-12 PROCEDURE — 83735 ASSAY OF MAGNESIUM: CPT | Performed by: NURSE PRACTITIONER

## 2023-04-12 PROCEDURE — 120N000002 HC R&B MED SURG/OB UMMC

## 2023-04-12 PROCEDURE — 97530 THERAPEUTIC ACTIVITIES: CPT | Mod: GP | Performed by: REHABILITATION PRACTITIONER

## 2023-04-12 PROCEDURE — 97535 SELF CARE MNGMENT TRAINING: CPT | Mod: GO | Performed by: OCCUPATIONAL THERAPIST

## 2023-04-12 PROCEDURE — 250N000013 HC RX MED GY IP 250 OP 250 PS 637

## 2023-04-12 PROCEDURE — 99231 SBSQ HOSP IP/OBS SF/LOW 25: CPT | Mod: GC | Performed by: PSYCHIATRY & NEUROLOGY

## 2023-04-12 PROCEDURE — 97112 NEUROMUSCULAR REEDUCATION: CPT | Mod: GO | Performed by: OCCUPATIONAL THERAPIST

## 2023-04-12 PROCEDURE — 92526 ORAL FUNCTION THERAPY: CPT | Mod: GN

## 2023-04-12 RX ORDER — POTASSIUM CHLORIDE 1.5 G/1.58G
20 POWDER, FOR SOLUTION ORAL ONCE
Status: COMPLETED | OUTPATIENT
Start: 2023-04-12 | End: 2023-04-12

## 2023-04-12 RX ADMIN — SODIUM CHLORIDE, SODIUM GLUCONATE, SODIUM ACETATE, POTASSIUM CHLORIDE AND MAGNESIUM CHLORIDE: 526; 502; 368; 37; 30 INJECTION, SOLUTION INTRAVENOUS at 02:05

## 2023-04-12 RX ADMIN — AMLODIPINE BESYLATE 10 MG: 10 TABLET ORAL at 08:00

## 2023-04-12 RX ADMIN — POTASSIUM CHLORIDE 20 MEQ: 1.5 POWDER, FOR SOLUTION ORAL at 12:34

## 2023-04-12 RX ADMIN — ACETAMINOPHEN 650 MG: 325 TABLET, FILM COATED ORAL at 18:36

## 2023-04-12 RX ADMIN — SENNOSIDES AND DOCUSATE SODIUM 2 TABLET: 50; 8.6 TABLET ORAL at 20:00

## 2023-04-12 RX ADMIN — POLYETHYLENE GLYCOL 3350 17 G: 17 POWDER, FOR SOLUTION ORAL at 08:00

## 2023-04-12 RX ADMIN — LISINOPRIL 20 MG: 20 TABLET ORAL at 08:01

## 2023-04-12 RX ADMIN — ATORVASTATIN CALCIUM 20 MG: 20 TABLET, FILM COATED ORAL at 20:00

## 2023-04-12 ASSESSMENT — ACTIVITIES OF DAILY LIVING (ADL)
ADLS_ACUITY_SCORE: 30
ADLS_ACUITY_SCORE: 32
ADLS_ACUITY_SCORE: 30
ADLS_ACUITY_SCORE: 32
ADLS_ACUITY_SCORE: 30
ADLS_ACUITY_SCORE: 32
ADLS_ACUITY_SCORE: 30
ADLS_ACUITY_SCORE: 32
ADLS_ACUITY_SCORE: 30
ADLS_ACUITY_SCORE: 30

## 2023-04-12 ASSESSMENT — VISUAL ACUITY: OU: NORMAL ACUITY

## 2023-04-12 NOTE — PROGRESS NOTES
[facility]    Stroke Progress Note    Interval EventsPhysical therapy evaluated the pt after stabilization of troponins (two subsequent decreased values after peak at 113), recommended ARU.  Pt complained of intermittent blurry vision on 4/11 evening.  Repeat scan stable. No other acute events overnight.  Medically ready for discharge.  Will initiate discussions with SW for placement.    HPI Summary  Ms. Mercy Osman is a 52 year old woman with a past medical history of HTN (on amlodipine-benazepril 10-20), history of prior Bell's Palsy admitted on 04/08/23 as a transfer from [facility] for intracranial hemorrhage.     Per chart review she presented 4/7/23 to [facility] ED via EMS after noted right sided weakness and facial droop. Last known well was 4/7 at 23:47. Per EHR review Ms. Osman was walking downstairs and fell due to sudden onset of right arm and leg weakness. She did not have obvious head trauma and she is not currently on anticoagulation. Stroke code activated upon arrival (see fellow note for full details). She was found to have a left basal ganglia hemorrhage. BP on arrival  To /115. Noted initially to be hypokalemic to 2.6, repleted. Started on a nicardipine gtt which has been off since 4/8.  Currently tolerating lisinopril and amlodipine (reintroduced 4/11) for pressure management.    Stroke Evaluation Summarized    MRI/Head CT  CT head 4/8/2023 12:31 AM-  1. 2.7 x 1.4 x 2.5 cm acute hemorrhage in the left thalamus/left internal capsule with involvement of the left basal ganglia. No intraventricular extension. This is typical of a hypertensive bleed.  2. Slight left to right bowing of the midline structures.   CT head 4/8/2023 9:48 AM-  Stable intraparenchymal hemorrhage centered at the left  thalamus with associated mass effect upon the left lateral ventricle.  No new intracranial hemorrhage or other acute intracranial findings.  CT  head 4/28/2023 9:09 PM-  Unchanged intraparenchymal hemorrhage centered in the left  thalamus with associated mass effect. No new hemorrhage or acute  intracranial finding.   Intracranial Vasculature HEAD CTA:  ANTERIOR CIRCULATION: Scattered atheromatous disease. No stenosis/occlusion, aneurysm, or high flow vascular malformation. Fetal origin of the right posterior cerebral artery from the anterior circulation.     POSTERIOR CIRCULATION: No stenosis/occlusion, aneurysm, or high flow vascular malformation. Balanced vertebral arteries supply a normal basilar artery.      DURAL VENOUS SINUSES: Not well evaluated on a technical basis   Cervical Vasculature NECK CTA:  RIGHT CAROTID: No measurable stenosis or dissection.     LEFT CAROTID: No measurable stenosis or dissection.     VERTEBRAL ARTERIES: No focal stenosis or dissection. Balanced vertebral arteries.     AORTIC ARCH: Classic aortic arch anatomy with no significant stenosis at the origin of the great vess     Echocardiogram Left ventricular function is decreased. The ejection fraction is 50-55%  (borderline). Mild to moderate concentric wall thickening consistent with left  ventricular hypertrophy is present.  Global right ventricular function is normal.  No significant valvular abnormalities present.  Estimated mean right atrial pressure is normal.  Ascending aorta dilated at 4.4 cm.  Trivial pericardial effusion is present.   EKG/Telemetry Sinus rhythm with sinus arrhythmia   Voltage criteria for left ventricular hypertrophy ( R in aVL , Sokolow-Jackson , [place] product )   ST & T wave abnormality, consider lateral ischemia   Prolonged QT   Abnormal ECG    Other Testing Not Applicable      LDL  4/8/2023: 142 mg/dL   A1C  4/8/2023: 6.2 %   Troponin 4/11/2023: 88 ng/L       Impression   #Acute hemorrhage in the left thalamus/left internal capsule with involvement of the left basal ganglia.   Ms. Mercy Osman is a 52 year old woman with a past medical history of  HTN admitted on 04/08/23 for spontaneous ICH, likely hypertensive in etiology (location, HTN during hospitalization). She presented to [facility] after she fell due to sudden onset of right-sided weakness.     Plan  #Spontaneous left thalamic intracranial hemorrhage, likely secondary to uncontrolled hypertension, Stable  #Vasogenic cerebral edema secondary to ICH  - ICH Score at admission: 0  - Hold all antiplatelet and anticoagulant medications  - Continue atorvastatin 20mg daily  - Neurochecks Q4H  - Systolic BP Goal: < 180 mmHg  - Labetalol and hydralazine PRN available   - Avoid hypotonic IV fluids  - Head of bed elevated 30 degrees     #Disposition  - PT --> recommend ARU  - OT --> recommend ARU  - SLP --> advance diet to regular diet with thin liquids     The patient is medically ready for discharge.  Will initiate discussions with social work for placement to ARU.    #HTN  - Cardiac monitoring  - SBP goal < 160 mmHg   - Labetalol and hydralazine available   - Hold PTA amlodipine-benazepril, further medication recommendations below  - Lisinopril 20mg daily  - continue amlodipine 10mg daily    #Troponin elevation, asymptomatic, spontaneously resolved  EKG wnl.  No cardiogenic symptoms, suspect transient stress related troponin leak.  - Trend 32 - 40 - 45 - 50 - 51 - 84 - 100 - 113 (peak) - 111 - 95 - 88  - EKG without obvious acute ischemic changes   - Echocardiogram WNL  - iIf patient has cardiogenic symptoms will consider  Repeat troponin draw and repeat EKG    #Hypokalemia, resolved, received replacement  #Hyperchloridemia, resolved with adequate hydration   - Daily BMP  - IV fluids: Plasmalyte 50 ml/hour, discontinued on 4/12  - Electrolyte replacement protocol    #Pre-diabetes   - Hgb A1c 6.2% 4/8/2023   - TSH 3.45   - Monitor glucose levels    #Anemia, chronic   - Daily CBC  - Hgb goal >7, plt goal >50k  - Transfuse to meet Hgb and plt goals    Patient Follow-up    Pending disposition/work up    The  patient was discussed with Dr. Castellano.    Adryan Reed DO  Resident Physician, PGY-2  Department of Neurology    Dragon disclaimer: This documentation was completed with the aid of dictation software.  Please note that there may be some inconsistencies due to software errors.  Errors are corrected in real time, however if there is a remaining error, please do not hesitate to reach out for clarification.     ______________________________________________________    Clinically Significant Risk Factors                                  Medications   Scheduled Meds    amLODIPine  10 mg Oral Daily     atorvastatin  20 mg Oral QPM     insulin aspart  1-7 Units Subcutaneous TID AC     insulin aspart  1-5 Units Subcutaneous At Bedtime     lisinopril  20 mg Oral or Feeding Tube Daily     polyethylene glycol  17 g Oral or Feeding Tube Daily     potassium chloride  20 mEq Oral or Feeding Tube Once     senna-docusate  2 tablet Oral or Feeding Tube QPM       Infusion Meds      PRN Meds  acetaminophen **OR** [DISCONTINUED] acetaminophen **OR** acetaminophen, artificial tears, glucose **OR** dextrose **OR** glucagon, hydrALAZINE, labetalol, ondansetron, prochlorperazine       PHYSICAL EXAMINATION  Temp:  [98.1  F (36.7  C)-98.7  F (37.1  C)] 98.5  F (36.9  C)  Pulse:  [80-90] 85  Resp:  [14-22] 14  BP: (131-155)/() 131/107  SpO2:  [95 %-97 %] 95 %      General Exam  General:  patient lying in bed without any acute distress    HEENT:  normocephalic/atraumatic  Cardio:  RRR  Pulmonary:  no respiratory distress  Abdomen:  soft  Extremities:  no edema  Skin:  intact     Neuro Exam  Mental status: Awake, alert, attentive, speech not always understandable per family. Oriented to month, year, self.  Follows commands when commands are asked by family in native language.   Cranial nerves: PERRL, conjugate gaze, visual fields full on confrontational testing. EOMI, right lower facial droop.    Motor: Normal bulk and tone. No  abnormal movements. Full strength in Left hemibody.  Right upper extremity without movement, right lower extremity weak with mild drift.    Sensory: Appears to be intact.   Coordination: Deferred.    Gait: Deferred.    Stroke Scales    NIHSS  1a. Level of Consciousness 0-->Alert, keenly responsive   1b. LOC Questions 0-->Answers both questions correctly   1c. LOC Commands 0-->Performs both tasks correctly   2.   Best Gaze 0-->Normal   3.   Visual 0-->No visual loss   4.   Facial Palsy 1-->Minor paralysis (flattened nasolabial fold, asymmetry on smiling)   5a. Motor Arm, Left 0-->No drift, limb holds 90 (or 45) degrees for full 10 secs   5b. Motor Arm, Right 4-->No movement   6a. Motor Leg, Left 0-->No drift, leg holds 30 degree position for full 5 secs   6b. Motor Leg, right 1-->Drift, leg falls by the end of the 5-sec period but does not hit bed   7.   Limb Ataxia 0-->Absent   8.   Sensory 1-->Mild-to-moderate sensory loss, patient feels pinprick is less sharp or is dull on the affected side, or there is a loss of superficial pain with pinprick, but patient is aware of being touched   9.   Best Language 0-->No aphasia, normal   10. Dysarthria 1-->Mild-to-moderate dysarthria, patient slurs at least some words and, at worst, can be understood with some difficulty   11. Extinction and Inattention  0-->No abnormality   Total 8 (04/12/23 1145)       Modified [place] Score (Pre-morbid)  0-No deficits    Imaging  I personally reviewed all imaging; relevant findings per HPI.     Lab Results Data   CBC  Recent Labs   Lab 04/12/23  0705 04/11/23  0558 04/10/23  0540   WBC 8.1 6.4 7.2   RBC 5.22* 4.86 4.74   HGB 13.0 12.2 12.0   HCT 43.1 40.1 39.4    289 302     Basic Metabolic Panel    Recent Labs   Lab 04/12/23  0759 04/12/23  0705 04/11/23  2145 04/11/23  1752 04/11/23  0756 04/11/23  0558 04/10/23  0730 04/10/23  0540   NA  --  138  --   --   --  142  --  142   POTASSIUM  --  3.8  --  4.2  --  3.4   < > 3.2*    CHLORIDE  --  103  --   --   --  106  --  108*   CO2  --  20*  --   --   --  21*  --  21*   BUN  --  8.2  --   --   --  6.8  --  6.7   CR  --  0.66  --   --   --  0.70  --  0.73   GLC 87 93 104*  --    < > 88   < > 105*   SALAZAR  --  9.5  --   --   --  9.3  --  9.1    < > = values in this interval not displayed.     Liver Panel  No results for input(s): PROTTOTAL, ALBUMIN, BILITOTAL, ALKPHOS, AST, ALT, BILIDIRECT in the last 168 hours.  INR    Recent Labs   Lab Test 04/08/23  0032   INR 0.99      Lipid Profile    Recent Labs   Lab Test 04/08/23  0842   *     A1C    Recent Labs   Lab Test 04/08/23  0842   A1C 6.2*     Troponin    Recent Labs   Lab 04/11/23  1752 04/11/23  1352 04/11/23  0558   CTROPT 88* 95* 111*          Data

## 2023-04-12 NOTE — PROGRESS NOTES
Care Management Discharge Note    Discharge Date: 04/14/23       Discharge Disposition: [facility]-Nurse to Nurse Ph: [phone]    Discharge Services:  Therapies    Discharge DME:  TBD    Discharge Transportation: [facility] 1:55-2:15PM 4/14/23    Private pay costs discussed: Not applicable    PAS Confirmation Code:  NA  Patient/family educated on Medicare website which has current facility and service quality ratings:  Yes    Education Provided on the Discharge Plan:  Yes  Persons Notified of Discharge Plans: Yes  Patient/Family in Agreement with the Plan:  Yes    Handoff Referral Completed: Yes    Additional Information:  SW spoke with Pt and family and they agreed to [facility] placement. SW set up discharge ride for tomorrow 2-2:45PM with [facility]. SW spoke with Primary Team to inform them of Pt's discharge time. SW will continue to follow Pt for safe discharge planning and placement.       ________________    CHAD Balbuena, LICSW  6D    [employer] [place]  Phone: [phone]  Pager: [phone]  Fax: [phone]

## 2023-04-12 NOTE — PLAN OF CARE
[id number]  Vitals: VSS, CCM    Neuros: Disorientated to time, RUE 0/5, RLE 3/5, L side 5/5, R droop, slurred speech improving (per family)  IV: PIV SLd  Labs/Electrolytes: Potasium replaced   Diet: Level 7 Diet, takes pills whole in apple sauce  Bowel status: Loose BM 4/10  : Incontinent, purewick in place   Skin: Bruising to BUE  Pain: Denies  Activity: Up with heavy assist of 2, stand pivot (PT recommending lift). Did take steps with PT in room. Sat in recliner. Will work with OT later this afternoon   Social: Family at bedside, ok'd to stay overnight  Plan: PLC completed 4/10, continue POC. SW spoke with family about ARU placement.

## 2023-04-12 NOTE — PLAN OF CARE
[id number]  Status: Admitted for acute ICH of L basal ganglia 4/8  Vitals: HTN, CCM    Neuros: Disorientated to time, RUE 0/5, RLE 3/5, L side 5/5, R droop, slurred speech improving (per family)  IV: PIV SL, PIV infusing   Labs/Electrolytes: BG check ACHS, WNL  Diet: Regular, takes pills whole in apple sauce  Bowel status: Loose BM 4/10, took miralax this AM  : Incontinent, purewick in place   Skin: Bruising to BUE  Pain: Denies  Activity: Up with heavy assist of 2, stand pivot (PT recommending lift)  Social: Family at bedside, ok'd to stay overnight  Plan: PLC completed 4/10, continue POC

## 2023-04-12 NOTE — DISCHARGE SUMMARY
[facility]    Neurology Stroke Discharge Summary    Date of Admission: 4/8/2023  Date of Discharge: 04/13/2023    Disposition: Discharged to rehabilitation facility  Primary Care Physician: Ara Osman      Admission Diagnosis:   Right sided weakness  HTN    Discharge Diagnosis:   Left Basal Ganglia Intraparenchymal Hemorrhage  HTN  HLD    Problem Leading to Hospitalization (from HPI):   Ms. Mercy Osman is a 52 year old woman with a past medical history of HTN (on PTA amlodipine-benazepril 10-20), history of prior Bell's Palsy admitted on 04/08/23 as a transfer from [facility] for left intracranial hemorrhage.     Per chart review she presented 4/7/23 to [facility] ED via EMS after noted right sided weakness and facial droop. Last known well was 4/7 at 23:47. Per EHR review Ms. Osman was walking downstairs and fell due to sudden onset of right arm and leg weakness. She did not have obvious head trauma and she is not currently on anticoagulation. Stroke code activated upon arrival (see fellow note for full details). She was found to have a left basal ganglia hemorrhage. BP on arrival  To /115. Noted initially to be hypokalemic to 2.6, repleted. Started on a nicardipine gtt which has been off since 4/8.  Currently tolerating lisinopril and amlodipine (reintroduced 4/11) for pressure management.  There have been moderate improvements in RLE, but the RUE remains plegic.  She was deemed a good candidate for acute rehabilitation and is discharging in stable condition on 4/13/23    Please see H&P dated 4/8/2023 for further details about presentation.    Brief Hospital Course:   Patient presented with Right sided weakness      Found to have left IPH    IV thrombolysis was not administered due to hemorrhage      Work-up as stated below under Pertinent Investigations.    Etiology is thought to be hypertensive.      Rehab evaluation: OT, PT and SLP.     Smoking  Cessation: patient is not a smoker    BP Long-term Goal: 140/90 or less, administer PRN antihypertensives for BP > 160 mmHg systolic    Antithrombotic/Anticoagulant Agent: not ordered due to contraindication hemorrhage    Statins: Started on atorvastatin 20mg daily       Hgb A1C Goal: < 7.0    Complications: None.     Other problems addressed during this hospitalization:    #HTN  - Cardiac monitoring during hospitalization, okay to discontinue at discharge  - SBP goal < 160 mmHg   - Hold PTA amlodipine-benazepril, discuss with primary care regarding re-initiation  - Lisinopril 20mg daily  - continue amlodipine 10mg daily     #Troponin elevation, asymptomatic, spontaneously resolved  EKG wnl.  No cardiogenic symptoms, suspect transient stress related troponin leak. No intervention performed. Our plan was to intervene with repeat EKG and further work up if the pt expressed cardiogenic symptoms, which she did not.  Troponin improved spontaneously without intervention.  - Trend 32 - 40 - 45 - 50 - 51 - 84 - 100 - 113 (peak) - 111 - 95 - 88  - EKG without obvious acute ischemic changes   - Echocardiogram WNL     #Hypokalemia, resolved, received replacement  #Hyperchloridemia, resolved with adequate hydration   - Daily BMP  - IV fluids: Plasmalyte 50 ml/hour, discontinued on 4/12  - Electrolyte replacement protocol was ordered during hospitalization     #Pre-diabetes   - Hgb A1c 6.2% 4/8/2023   - TSH 3.45   - Monitor with surveillance with primary care outpatient     #Anemia, chronic   - Daily CBC done in hospital  - Hgb goal >7, plt goal >50k  - No transfusions needed during hospitalization    PERTINENT INVESTIGATIONS    Labs  Lipid Panel: Recent Labs   Lab Test 04/08/23  0842   *     A1C:   Lab Results   Component Value Date    A1C 6.2 04/08/2023     INR:   Recent Labs   Lab 04/08/23  0032   INR 0.99      Coag Panel / Hypercoag Workup: Not indicated  Pending test results: None    MRI/Head CT  CT head 4/8/2023  12:31 AM-  1. 2.7 x 1.4 x 2.5 cm acute hemorrhage in the left thalamus/left internal capsule with involvement of the left basal ganglia. No intraventricular extension. This is typical of a hypertensive bleed.  2. Slight left to right bowing of the midline structures.   CT head 4/8/2023 9:48 AM-  Stable intraparenchymal hemorrhage centered at the left  thalamus with associated mass effect upon the left lateral ventricle.  No new intracranial hemorrhage or other acute intracranial findings.  CT head 4/28/2023 9:09 PM-  Unchanged intraparenchymal hemorrhage centered in the left thalamus with associated mass effect. No new hemorrhage or acute intracranial finding.   Intracranial Vasculature HEAD CTA:  ANTERIOR CIRCULATION: Scattered atheromatous disease. No stenosis/occlusion, aneurysm, or high flow vascular malformation. Fetal origin of the right posterior cerebral artery from the anterior circulation.     POSTERIOR CIRCULATION: No stenosis/occlusion, aneurysm, or high flow vascular malformation. Balanced vertebral arteries supply a normal basilar artery.      DURAL VENOUS SINUSES: Not well evaluated on a technical basis   Cervical Vasculature NECK CTA:  RIGHT CAROTID: No measurable stenosis or dissection.     LEFT CAROTID: No measurable stenosis or dissection.     VERTEBRAL ARTERIES: No focal stenosis or dissection. Balanced vertebral arteries.     AORTIC ARCH: Classic aortic arch anatomy with no significant stenosis at the origin of the great vess      Echocardiogram Left ventricular function is decreased. The ejection fraction is 50-55%  (borderline). Mild to moderate concentric wall thickening consistent with left  ventricular hypertrophy is present.  Global right ventricular function is normal.  No significant valvular abnormalities present.  Estimated mean right atrial pressure is normal.  Ascending aorta dilated at 4.4 cm.  Trivial pericardial effusion is present.   EKG/Telemetry Sinus rhythm with sinus  arrhythmia   Voltage criteria for left ventricular hypertrophy ( R in aVL , Sokolow-Jackson , [place] product )   ST & T wave abnormality, consider lateral ischemia   Prolonged QT   Abnormal ECG    Other Testing Not Applicable       LDL  4/8/2023: 142 mg/dL   A1C  4/8/2023: 6.2 %   Troponin 4/11/2023: 88 ng/L       Endovascular procedure: None     Cardiac Monitoring: Patient had > 24 hrs of cardiac monitor while in hospital.    Findings: No atrial fibrillation was found.    Sleep Apnea Screen:   Questions/Answers      1. Prior to your stroke, have you been told that you snore? No.    2. Prior to your stroke, have you been told that you struggle to breath while you are sleeping? No.    3. Prior to your stroke, do you feel tired and sleepy even after getting a normal night of sleep? Yes.    Sleep Apnea Screen Findings: Patient has 0-1 symptoms of sleep apnea.  Further sleep study is not recommended at this time.    PHQ-9 Depression Screen Score: 8    Education discussed with: patient and family on blood pressure management, cholesterol management and medical management.    During daily rounds, the plan of care was discussed and developed with patient, spouse, child and family.  Plan of care includes: Disposition, blood pressure management, expectant recovery..    PHYSICAL EXAMINATION  Vital Signs:  B/P: 129/98, T: 98, P: 93, R: 16    General:  patient lying in bed without any acute distress    HEENT:  normocephalic/atraumatic  Cardio:  RRR  Pulmonary:  no respiratory distress  Abdomen:  soft  Extremities:  no edema  Skin:  intact      Neuro Exam  Mental status: Awake, alert, attentive, speech not always understandable per family. Oriented to month, year, self.  Follows commands when commands are asked by family in native language.   Cranial nerves: PERRL, conjugate gaze, visual fields full on confrontational testing. EOMI, right lower facial droop.    Motor: Normal bulk and tone. No abnormal movements. Full strength in  Left hemibody.  Right upper extremity without movement, right lower extremity weak with mild drift.    Sensory: Appears to be intact.   Coordination: Deferred.    Gait: Deferred.    National Institutes of Health Stroke Scale (on day of discharge)  NIHSS Total Score: 8    Modified Rod Score (Discharge)  4-Moderately severe disability; unable to walk without assistance and unable to attend to own bodily    Medications    Current Discharge Medication List      START taking these medications    Details   amLODIPine (NORVASC) 10 MG tablet Take 1 tablet (10 mg) by mouth daily  Qty: 90 tablet, Refills: 0    Associated Diagnoses: Hypertension, unspecified type; Nontraumatic subcortical hemorrhage of left cerebral hemisphere (H)      atorvastatin (LIPITOR) 20 MG tablet Take 1 tablet (20 mg) by mouth every evening  Qty: 30 tablet, Refills: 0    Associated Diagnoses: Nontraumatic subcortical hemorrhage of left cerebral hemisphere (H); Hyperlipidemia, unspecified hyperlipidemia type      lisinopril (ZESTRIL) 20 MG tablet 1 tablet (20 mg) by Oral or Feeding Tube route daily  Qty: 90 tablet, Refills: 0    Associated Diagnoses: Hypertension, unspecified type; Nontraumatic subcortical hemorrhage of left cerebral hemisphere (H)         STOP taking these medications       amLODIPine-benazepril (LOTREL) 10-20 MG capsule Comments:   Reason for Stopping:               Additional recommendations and follow up:       Specialty Care Coordination Referral      General info for SNF    Length of Stay Estimate: Short Term Care: Estimated # of Days <30  Condition at Discharge: Improving  Level of care:skilled   Rehabilitation Potential: Good  Admission H&P remains valid and up-to-date: Yes  Recent Chemotherapy: N/A  Use Nursing Home Standing Orders: Yes     Follow Up and recommended labs and tests    Follow up with primary care provider within two weeks after discharge from rehab.    Follow up with neurology 6-8 weeks after your discharge  from the hospital.  A referral has been placed on your behalf.     Reason for your hospital stay    You were in the hospital for a bleed in the left hemisphere of your brain.     Additional Discharge Instructions    Please return to the emergency department IMMEDIATELY if you experience any of the following:  - Sudden onset weakness or numbness in one side of your body when compared to the other.  - Sudden onset difficulty speaking.  - Sudden onset severe headache.  - Sudden onset blindness or other visual abnormalities.  - Episodes of unconsciousness, rhythmic/jerking motions, or other seizure like activity.     Activity - Up ad catrachita     Full Code     Physical Therapy Adult Consult    Evaluate and treat as clinically indicated.    Reason:  Left intraparenchymal hemorrhage.     Occupational Therapy Adult Consult    Evaluate and treat as clinically indicated.    Reason:  Left intraparenchymal hemorrhage.     Speech Language Path Adult Consult    Evaluate and treat as clinically indicated.    Reason:  Left intraparenchymal hemorrhage.     Fall precautions     Diet    Follow this diet upon discharge: Orders Placed This Encounter      Easy to Chew Diet (level 7) Thin Liquids (level 0)     Stroke Hospital Follow Up    Please be aware that coverage of these services is subject to the terms and limitations of your health insurance plan.  Call member services at your health plan with any benefit or coverage questions.  [employer] will call you to coordinate care as prescribed by your provider. If you don t hear from a representative within 2 business days, please call [phone].         Patient was seen and discussed with the Attending, Dr. Gray Reed, DO  Resident Physician, PGY-2  Department of Neurology    Dragon disclaimer: This documentation was completed with the aid of dictation software.  Please note that there may be some inconsistencies due to software errors.  Errors are corrected in real  time, however if there is a remaining error, please do not hesitate to reach out for clarification.

## 2023-04-12 NOTE — PLAN OF CARE
Status: Admitted for acute intracranial hemorrhage (ICH) of left basal ganglia. Mechanism of this ICH is thought to be poorly controlled HTN.   Hx: HTN, chronic anemia, and prior Bell's palsy.   Vitals: HTN within parameters to keep SBP <160. On RA. CCM.   Neuros: Disorientated to time, with choices. Pt does not know birthday but can state full name. Hmong speaking - family at bedside. Flat, withdrawn. Slow, whispered speech, WFD. Slight R droop. R side numbness to face and RLE. Absent sensation RUE, decreased sensation on RLE. L side 5/5. RUE 0/5, RLE 3/5. Intermittent blurry vision - baseline per family, denied this shift.   IV: 1 PIV SL. 1 PIV infusing plasmalyte @ 50mL/hr.   Labs/Electrolytes: CT completed on evening d/t blurry vision - no concerns.  Last troponin = 88, trending down. Electrolyte redraws in AM. BG checks ACHS.   Resp/trach: LSC.   Diet: Regular diet with thins. Takes pills whole in applesauce.   Bowel status: LBM 4/10.   : Incontinence. Purewick in place.   Skin: Bruising on arms.   Pain: Denied.   Activity: A2. Per PT, use lift to recliner/commode. Turn and repo q2hr. HOB @30 degrees. No SCDs d/t bleed and hx of DVT.   Social: Family at bedside, ok'd to stay overnight.   Plan: PLC completed 4/10. Hold antiplatelet and anticoagulant medications. Team aware that family would like doctor's note for work exemption, team needs names of family members for notes.

## 2023-04-12 NOTE — INTERIM SUMMARY
[facility] Pre-Admission Screen    Referral Source:  [facility] [address] [place] 6210-01  Admit date to referring facility: 4/8/2023    Physical Medicine and Rehab Consult Completed: No    Rehab Diagnosis:    Stroke Vascular Hemorrhagic 01.2 (R) Body Involvement (L) Brain -  left basal ganglia hemorrhage    Justification for Acute Inpatient Rehabilitation  Ms. Mercy Osman is a 52 year old woman with a past medical history of HTN (on amlodipine-benazepril 10-20), history of prior Bell's Palsy admitted on 04/08/23 as a transfer from [facility] for intracranial hemorrhage.  Per EHR review Ms. Osman was walking downstairs and fell due to sudden onset of right arm and leg weakness. She did not have obvious head trauma and she is not currently on anticoagulation. Stroke code activated upon arrival. She was found to have a left basal ganglia hemorrhage. BP on arrival to /115. Noted initially to be hypokalemic to 2.6, repleted. Started on a nicardipine gtt which has been off since 4/8. The stroke has been attributed likely to uncontrolled hypertension. She has also needed ongoing medical mgt of her pre-diabetes, electrolyte imbalances, and HTN. The patient is now medically stable and ready for transfer to acute inpatient rehabilitation.   The patient requires an intensive inpatient rehab program to address the following acute impairments: dysarthria, dysphagia, impaired activity tolerance, impaired balance, impaired coordination, impaired ROM, impaired sensation, impaired strength w/ R hemiparesis, impaired motor control, fatigue, dizziness, and impaired weight shifting. These impairments are impacting the patients ability to safely and independently perform bed mobility, functional transfers, gait, stairs, ADLs, and IADLs, as well as her ability to safely and effectively communicate and eat.      Current Active Medical Management Needs/Risks for Clinical  Complications  The patient requires the high level of rehabilitation physician supervision that accompanies the provision of intensive rehabilitation therapy.  The patient needs the services of the rehabilitation physician to assess the patient medically and functionally and to modify the course of treatment as needed to maximize the patient's capacity to benefit from the rehabilitation process.   The patient requires oversight to manage and assess:    Neurology in the setting of left basal ganglia hemorrhage and R hemiparesis: Assess neurologic recovery. Provide stroke education and risk factor reduction strategies (modifiable risk factors: HTN, prediabetes).  Hold all antiplatelet and anticoagulant medications. Initiated on Atorvastatin on 4/11. Systolic BP Goal: < 180 mmHg. Avoid hypotonic IV fluids. HOB 30 degrees. Pt w/ R hemiparesis w/ 0/5 strength in RUE and 3/5 strength in RLE. Pt currently using R Keren Neurexa for right shoulder stability given 0/5 strength in R shoulder girdle and risk for R shoulder subluxation. Pt will require frequent skin checks and assistance w/ positioning to promote RUE joint integrity given RUE hemiplegia.     Cardiology in the setting of HTN and elevated troponin: Hold PTA amlodipine-benazepril. Pt has required ongoing titration of BP meds to achieve systolic BP goal of <180mm Hg. Currently on Amlodipine and Lisinopril (increased last on 4/11). Continue to monitor cardiovascular response to increased levels of activity. EKG/Echo WNL.  No cardiogenic symptoms, suspect transient stress related troponin leak.    Dysphagia: pt w/ mild dysphagia, cleared for regular diet w/ thin liquids (family requesting easy to chew diet w/ thin liquids). Pt requires ongoing monitoring of R sided oral residuals. Continue to reinforce safe swallowing strategies.  Pt at risk for aspiration pneumonia in setting of dysphagia.     Hypokalemia and hyperchloridemia: Electrolyte replacement protocol. IV  plasmalyte discontinued on 4/12. Continue to monitor BMP.     Endocrine in the setting of prediabetes: Hgb A1c 6.2% 4/8/2023. Continue to monitor glucose.  Provide diabetic education in setting of new pre-diabetes.     Hematology in the setting of anemia: Hgb goal >7, plt goal >50k. Continue to monitor CBC.     Bowels: pt requiring mgmt of bowel regimen - on Miralax and Senna-Docusate.     Pain: Patient will need ongoing assessment and adjustment of pain medications for optimal participation in therapies. Currently requiring Tylenol. Pt at risk for pain in setting of stroke.     Mental health: pt at risk for mood and sleep disorders in setting of CVA. Promote effective coping strategies in pt w/ new functional status in setting of stroke.   The patient is at risk for falls with or without injury due to gait, balance impairments, and R hemiparesis. She is at risk for recurrent stroke in the setting of current stroke.    Past Medical/Surgical History  Surgery in the past 100 days: Unknown  Additional relevant past medical history: HTN and [place] Palsy    Level of Functioning Prior to Admission:  LIVING ENVIRONMENT  People in Home: child(riaz), adult, spouse (2 daughters, 1 son)  Current Living Arrangements: house  Home Accessibility: stairs to enter home, stairs within home  Number of Stairs, Main Entrance: 1  Stair Railings, Main Entrance: none  Number of Stairs, Within Home, Primary: greater than 10 stairs (12)  Stair Railings, Within Home, Primary: railing on right side (ascending)  Transportation Anticipated: family or friend will provide  Living Environment Comments: Pt lives with spouse and adult children (son and 2 daughters) in home with 1 BENJAMIN and no rail, 12 steps to basement for laundry but all needs can be met on main level.    SELF-CARE  Usual Activity Tolerance: good  Regular Exercise: Yes  Activity/Exercise Type: walking (walking 1-2 miles at [place] during warm weather)  Exercise Amount/Frequency: 3-5  times/wk  Equipment Currently Used at Home: none    Additional Comments: Daughter is able to provide 24/7 support on discharge. She is interested in becoming the patient's PCA.     Level of Function: GG Scale (Section GG Functional Ability and Goals; CMS's BOB Version 3.0 Manual effective 10.1.2019):  PT Current Function Goals for Rehab   Bed Rolling Not completed 6 Independent   Supine to Sit 1 Dependent (Ax2) 4 Supervision or touching assitance   Sit to Stand 1 Dependent(Ax2) 4 Supervision or touching assitance   Transfer 1 Dependent (Ax2) 4 Supervision or touching assitance   Ambulation 1 Dependent (Ax2) 4 Supervision or touching assitance   Stairs 88 Not attempted due to safety 3 Partial/moderate assistance     OT Current Function Goals for Rehab   Feeding 5 Setup or clean-up assistance 6 Independent   Grooming 4 Supervision or touching assitance (seated) 5 Setup or clean-up assistance   Bathing Not completed 5 Setup or clean-up assistance   Upper Body Dressing Not completed 4 Supervision or touching assitance   Lower Body Dressing Not completed 3 Partial/moderate assistance   Toileting 1 Dependent 4 Supervision or touching assitance   Toilet Transfer 1 Dependent (Ax2) 4 Supervision or touching assitance   Tub/Shower Transfer 88 Not attempted due to safety 3 Partial/moderate assistance   Cognition Not Assessed Supervision     SLP Current Function Goals for Rehab   Swallow Impaired Tolerate least restrictive diet without signs & symptoms of aspiration and adhere to safe swallow strategies   Communication Not Assessed Communicate basic needs effectively     Current Diet:  0-Thin and 7-Easy to chew    Summary Statement:  The patient currently has physical therapy, occupational therapy and speech therapy needs. The patient currently has R sided hemiparesis w/ 0/5 strength in UE and 3/5 strength in RLE and altered sensation. Supine>sit with modAx2. STS with minAx2 and no AD to modAx1. Pivot transfer from EOB to chair  required MaxAx2 d/t positioning of RLE and language barrier with verbal cues. She ambulates 8 ft x2 w/ mod A x2 requiring mod-max A for RLE advancement and heavy verbal cues for sequencing, weightshifting, and stepping. Pt w/ occasional RLE buckling w/ intermittent knee block required. Pt also w/ LLE buckling w/ fatigue. Pt able to recognize knee buckling but has minimal ability to correct requiring A to prevent a fall. Pt required 2+ VC for safe body mechanics and attention to affected limbs when standing. Patient currently using a mechanical lift for OOB transfers/cares. Facilitated seated g/h & toothbrushing tasks to promote IND with ADLs. Pt required set up assist and VC for sequencing of task. Pt appeared to act impulsively at times (beginning to brush teeth without water or toothpaste). SBA throughout face washing and toothbrushing tasks. She benefits from use of RUE Keren Neurexa to facilitate safe positioning of RUE during functional tasks. IP SLP currently addressing mild dysphagia w/ ongoing pt and family instruction in safe swallowing strategies and importance of assessing for R sided oral residues. Pt w/ dysarthria as well.  They do recommend a cognitive-linguistic assessment at the next level of care. If patient progresses out of speech needs, these minutes would be given back to PT/OT.     Expected Therapies and Services Required During Inpatient Rehab Admission  Intensity of Therapy: Patient requires intensive therapies not available in a lesser level of care. Patient is motivated, making gains, and can tolerate 3 hours of therapy a day.  Physical Therapy: 60 minutes per day, 7 days a week for 12 days  Occupational Therapy: 60 minutes per day, 7 days a week for 12 days  Speech and Language Therapy: 60 minutes per day, 7 days a week for 12 days  Rehabilitation Nursing Needs: Patient requires 24 hour Rehab Nursing to manage vitals, medication education, positioning, carryover of new rehab techniques, care  coordination, blood sugar management, diabetes education, assess neurologic status, stroke education and provide safe environment for patient at falls risk.    Precautions/restrictions/special needs:  Precautions: fall precautions, aspiration precautions  Restrictions: SBP < 180, HOB 30 degrees  Special Needs: lift and  needs    Expected Level of Improvement: Anticipate with intensive therapies, close medical management, and rehabilitative nursing care the patient will improve strength, balance, tolerance to activity, safety to ensure Tal or less with basic mobility and ADL performance to allow return home with family assistance.   Expected Length of time to achieve: 12 days    Anticipated Discharge Needs:  Anticipated Discharge Destination: Home  Anticipated Discharge Support: Family member  24/7 support available : Yes  Identified caregiver(s):  Daughter  Anticipated Discharge Needs: Home with home or OP therapy pending functional progress    Identified challenges/barriers: None anticipated.     Liaison signature/date/time:    Physician statement of review and agreement:  I have reviewed and am in agreement of the need for IRF stay to address above functional and medical needs. In addition to above statements address, Patient requires intensive active and ongoing therapeutic intervention and multiple therapies; Patient requires medical supervision; Expected to actively participate in the intensive rehab program; Sufficiently stable to actively participate; Expectation for measurable improvement in functional capacity or adaption to impairments.    MD signature/date/time:

## 2023-04-12 NOTE — PLAN OF CARE
Status: Admitted for acute intracranial hemorrhage (ICH) of left basal ganglia. Mechanism of this ICH is thought to be poorly controlled HTN  Vitals: HTN within parameters, OVSS on RA. On CCM, no afib  Neuros: Hmong speaking, AOx4 with choices. Flat, withdrawn, slow and slurred speech per family, R facial/arm/leg numbness, R facial droop, RUE 0 RLE 3/5, L side 5/5  IV: PIV SL x1, PIV infusing plasmalyte @ 50ml/hr  Labs/Electrolytes: troponin q6hrs, 1800 was 88. K replaced, redraw was 4.2 at 1800, Mg replacement completed this shift  Resp/trach: LSC  Diet: regular, poor appetite, drinks large volumes. Takes pills whole in applesauce  Bowel status: BS+, LBM 4/10, loose, bowel meds held  : incontinent, purewick in place  Skin: bruising to BUE  Pain: denies  Activity: A2L, repo q2 hrs, HOB @ 30, difficult pivot with A2 with PT today  Social:  Family at bedside, one member OK to stay overnight  Plan: Hold antiplatelet and anticoagulant medications. Team aware that family would like notes from MD for their work exemption, team needs names of family members for notes.   Updates this shift: pt c/o intermittent blurry vision at 1600, not previously noted and pt/family unable to clarify timeframe or severity at the time, MDs notified and CT completed with no concerning findings    Stroke Patients:   PLC scheduled or completed: done 4/10  Pneumoboots in place: no d/t bleed and hx DVT

## 2023-04-13 ENCOUNTER — APPOINTMENT (OUTPATIENT)
Dept: PHYSICAL THERAPY | Facility: CLINIC | Age: 53
End: 2023-04-13
Attending: PSYCHIATRY & NEUROLOGY
Payer: COMMERCIAL

## 2023-04-13 LAB
ANION GAP SERPL CALCULATED.3IONS-SCNC: 15 MMOL/L (ref 7–15)
BUN SERPL-MCNC: 13.3 MG/DL (ref 6–20)
CALCIUM SERPL-MCNC: 9.6 MG/DL (ref 8.6–10)
CHLORIDE SERPL-SCNC: 105 MMOL/L (ref 98–107)
CREAT SERPL-MCNC: 0.72 MG/DL (ref 0.51–0.95)
DEPRECATED HCO3 PLAS-SCNC: 19 MMOL/L (ref 22–29)
ERYTHROCYTE [DISTWIDTH] IN BLOOD BY AUTOMATED COUNT: 16.9 % (ref 10–15)
GFR SERPL CREATININE-BSD FRML MDRD: >90 ML/MIN/1.73M2
GLUCOSE BLDC GLUCOMTR-MCNC: 115 MG/DL (ref 70–99)
GLUCOSE BLDC GLUCOMTR-MCNC: 186 MG/DL (ref 70–99)
GLUCOSE BLDC GLUCOMTR-MCNC: 95 MG/DL (ref 70–99)
GLUCOSE BLDC GLUCOMTR-MCNC: 99 MG/DL (ref 70–99)
GLUCOSE SERPL-MCNC: 99 MG/DL (ref 70–99)
HCT VFR BLD AUTO: 44 % (ref 35–47)
HGB BLD-MCNC: 13.4 G/DL (ref 11.7–15.7)
MAGNESIUM SERPL-MCNC: 2.1 MG/DL (ref 1.7–2.3)
MCH RBC QN AUTO: 25.2 PG (ref 26.5–33)
MCHC RBC AUTO-ENTMCNC: 30.5 G/DL (ref 31.5–36.5)
MCV RBC AUTO: 83 FL (ref 78–100)
PHOSPHATE SERPL-MCNC: 5.1 MG/DL (ref 2.5–4.5)
PLATELET # BLD AUTO: 345 10E3/UL (ref 150–450)
POTASSIUM SERPL-SCNC: 4 MMOL/L (ref 3.4–5.3)
POTASSIUM SERPL-SCNC: 4 MMOL/L (ref 3.4–5.3)
RBC # BLD AUTO: 5.32 10E6/UL (ref 3.8–5.2)
SODIUM SERPL-SCNC: 139 MMOL/L (ref 136–145)
WBC # BLD AUTO: 8.7 10E3/UL (ref 4–11)

## 2023-04-13 PROCEDURE — 36415 COLL VENOUS BLD VENIPUNCTURE: CPT | Performed by: NURSE PRACTITIONER

## 2023-04-13 PROCEDURE — 120N000002 HC R&B MED SURG/OB UMMC

## 2023-04-13 PROCEDURE — 97116 GAIT TRAINING THERAPY: CPT | Mod: GP

## 2023-04-13 PROCEDURE — 250N000013 HC RX MED GY IP 250 OP 250 PS 637

## 2023-04-13 PROCEDURE — 84100 ASSAY OF PHOSPHORUS: CPT | Performed by: NURSE PRACTITIONER

## 2023-04-13 PROCEDURE — 80048 BASIC METABOLIC PNL TOTAL CA: CPT | Performed by: NURSE PRACTITIONER

## 2023-04-13 PROCEDURE — 97112 NEUROMUSCULAR REEDUCATION: CPT | Mod: GP

## 2023-04-13 PROCEDURE — 83735 ASSAY OF MAGNESIUM: CPT | Performed by: NURSE PRACTITIONER

## 2023-04-13 PROCEDURE — 99231 SBSQ HOSP IP/OBS SF/LOW 25: CPT | Mod: GC | Performed by: PSYCHIATRY & NEUROLOGY

## 2023-04-13 PROCEDURE — 250N000013 HC RX MED GY IP 250 OP 250 PS 637: Performed by: STUDENT IN AN ORGANIZED HEALTH CARE EDUCATION/TRAINING PROGRAM

## 2023-04-13 PROCEDURE — 97530 THERAPEUTIC ACTIVITIES: CPT | Mod: GP

## 2023-04-13 PROCEDURE — 250N000013 HC RX MED GY IP 250 OP 250 PS 637: Performed by: PSYCHIATRY & NEUROLOGY

## 2023-04-13 PROCEDURE — 85027 COMPLETE CBC AUTOMATED: CPT | Performed by: NURSE PRACTITIONER

## 2023-04-13 RX ORDER — AMLODIPINE BESYLATE 10 MG/1
10 TABLET ORAL DAILY
Qty: 90 TABLET | Refills: 0 | Status: ON HOLD | OUTPATIENT
Start: 2023-04-13 | End: 2023-05-04

## 2023-04-13 RX ORDER — ATORVASTATIN CALCIUM 20 MG/1
20 TABLET, FILM COATED ORAL EVERY EVENING
Qty: 30 TABLET | Refills: 0 | Status: ON HOLD | OUTPATIENT
Start: 2023-04-13 | End: 2023-05-04

## 2023-04-13 RX ORDER — LISINOPRIL 20 MG/1
20 TABLET ORAL DAILY
Qty: 90 TABLET | Refills: 0 | Status: ON HOLD | OUTPATIENT
Start: 2023-04-14 | End: 2023-05-04

## 2023-04-13 RX ADMIN — AMLODIPINE BESYLATE 10 MG: 10 TABLET ORAL at 08:00

## 2023-04-13 RX ADMIN — LISINOPRIL 20 MG: 20 TABLET ORAL at 08:00

## 2023-04-13 RX ADMIN — POLYETHYLENE GLYCOL 3350 17 G: 17 POWDER, FOR SOLUTION ORAL at 08:00

## 2023-04-13 RX ADMIN — ACETAMINOPHEN 650 MG: 325 TABLET, FILM COATED ORAL at 23:41

## 2023-04-13 RX ADMIN — ATORVASTATIN CALCIUM 20 MG: 20 TABLET, FILM COATED ORAL at 20:00

## 2023-04-13 RX ADMIN — ACETAMINOPHEN 650 MG: 325 TABLET, FILM COATED ORAL at 12:35

## 2023-04-13 ASSESSMENT — ACTIVITIES OF DAILY LIVING (ADL)
ADLS_ACUITY_SCORE: 32
ADLS_ACUITY_SCORE: 28
ADLS_ACUITY_SCORE: 36
ADLS_ACUITY_SCORE: 36
ADLS_ACUITY_SCORE: 26
ADLS_ACUITY_SCORE: 28
ADLS_ACUITY_SCORE: 30
ADLS_ACUITY_SCORE: 36
ADLS_ACUITY_SCORE: 30

## 2023-04-13 ASSESSMENT — PATIENT HEALTH QUESTIONNAIRE - PHQ9: SUM OF ALL RESPONSES TO PHQ QUESTIONS 1-9: 8

## 2023-04-13 NOTE — PROGRESS NOTES
[facility]    Stroke Progress Note    Interval EventsNo acute events overnight.  No major change in the patient's symptoms today; continues to have significant right upper extremity weakness with some weakness in the right lower extremity as well.  Attempted to discharge the patient to ARU, but the transfer was declined today due to inadequate staffing levels.  A ride has been set up for the same time on 4/14, with the hope that the patient will be able to discharge to rehab at that time.    HPI Summary  Ms. Mercy Osman is a 52 year old woman with a past medical history of HTN (on amlodipine-benazepril 10-20), history of prior Bell's Palsy admitted on 04/08/23 as a transfer from [facility] for intracranial hemorrhage.     Per chart review she presented 4/7/23 to [facility] ED via EMS after noted right sided weakness and facial droop. Last known well was 4/7 at 23:47. Per EHR review Ms. Osman was walking downstairs and fell due to sudden onset of right arm and leg weakness. She did not have obvious head trauma and she is not currently on anticoagulation. Stroke code activated upon arrival (see fellow note for full details). She was found to have a left basal ganglia hemorrhage. BP on arrival  To /115. Noted initially to be hypokalemic to 2.6, repleted. Started on a nicardipine gtt which has been off since 4/8.  Currently tolerating lisinopril and amlodipine (reintroduced 4/11) for pressure management.    Stroke Evaluation Summarized    MRI/Head CT  CT head 4/8/2023 12:31 AM-  1. 2.7 x 1.4 x 2.5 cm acute hemorrhage in the left thalamus/left internal capsule with involvement of the left basal ganglia. No intraventricular extension. This is typical of a hypertensive bleed.  2. Slight left to right bowing of the midline structures.   CT head 4/8/2023 9:48 AM-  Stable intraparenchymal hemorrhage centered at the left  thalamus with associated mass effect  upon the left lateral ventricle.  No new intracranial hemorrhage or other acute intracranial findings.  CT head 4/28/2023 9:09 PM-  Unchanged intraparenchymal hemorrhage centered in the left  thalamus with associated mass effect. No new hemorrhage or acute  intracranial finding.   Intracranial Vasculature HEAD CTA:  ANTERIOR CIRCULATION: Scattered atheromatous disease. No stenosis/occlusion, aneurysm, or high flow vascular malformation. Fetal origin of the right posterior cerebral artery from the anterior circulation.     POSTERIOR CIRCULATION: No stenosis/occlusion, aneurysm, or high flow vascular malformation. Balanced vertebral arteries supply a normal basilar artery.      DURAL VENOUS SINUSES: Not well evaluated on a technical basis   Cervical Vasculature NECK CTA:  RIGHT CAROTID: No measurable stenosis or dissection.     LEFT CAROTID: No measurable stenosis or dissection.     VERTEBRAL ARTERIES: No focal stenosis or dissection. Balanced vertebral arteries.     AORTIC ARCH: Classic aortic arch anatomy with no significant stenosis at the origin of the great vess     Echocardiogram Left ventricular function is decreased. The ejection fraction is 50-55%  (borderline). Mild to moderate concentric wall thickening consistent with left  ventricular hypertrophy is present.  Global right ventricular function is normal.  No significant valvular abnormalities present.  Estimated mean right atrial pressure is normal.  Ascending aorta dilated at 4.4 cm.  Trivial pericardial effusion is present.   EKG/Telemetry Sinus rhythm with sinus arrhythmia   Voltage criteria for left ventricular hypertrophy ( R in aVL , Sokolow-Jackson , Jose product )   ST & T wave abnormality, consider lateral ischemia   Prolonged QT   Abnormal ECG    Other Testing Not Applicable      LDL  4/8/2023: 142 mg/dL   A1C  4/8/2023: 6.2 %   Troponin 4/11/2023: 88 ng/L       Impression   #Acute hemorrhage in the left thalamus/left internal capsule with  involvement of the left basal ganglia.   Ms. Mercy Osman is a 52 year old woman with a past medical history of HTN admitted on 04/08/23 for spontaneous ICH, likely hypertensive in etiology (location, HTN during hospitalization). She presented to [facility] after she fell due to sudden onset of right-sided weakness.     Plan  #Spontaneous left thalamic intracranial hemorrhage, likely secondary to uncontrolled hypertension, Stable  #Vasogenic cerebral edema secondary to ICH  - ICH Score at admission: 0  - Hold all antiplatelet and anticoagulant medications  - Continue atorvastatin 20mg daily  - Neurochecks Q4H  - Systolic BP Goal: < 160 mmHg  - Labetalol and hydralazine PRN available   - Avoid hypotonic IV fluids  - Head of bed elevated 30 degrees     #Disposition  - PT --> recommend ARU  - OT --> recommend ARU  - SLP --> regular diet with thin liquids was recommended, but patient's family is requesting easy to chew diet    The patient is medically ready for discharge.  Accepted at ARU; will hopefully go on 4/14 if beds and staffing allow.    #HTN  - Cardiac monitoring  - SBP goal < 160 mmHg   - Labetalol and hydralazine available   - Hold PTA amlodipine-benazepril, further medication recommendations below  - Lisinopril 20mg daily  - continue amlodipine 10mg daily    #Troponin elevation, asymptomatic, spontaneously resolved  EKG wnl.  No cardiogenic symptoms, suspect transient stress related troponin leak.  - Trend 32 - 40 - 45 - 50 - 51 - 84 - 100 - 113 (peak) - 111 - 95 - 88  - EKG without obvious acute ischemic changes   - Echocardiogram WNL  - iIf patient has cardiogenic symptoms will consider  Repeat troponin draw and repeat EKG    #Hypokalemia, resolved, received replacement  #Hyperchloridemia, resolved with adequate hydration   - Daily BMP  - IV fluids: Plasmalyte 50 ml/hour, discontinued on 4/12  - Electrolyte replacement protocol    #Pre-diabetes   - Hgb A1c 6.2% 4/8/2023   - TSH 3.45   - Monitor glucose  levels    #Anemia, chronic   - Daily CBC  - Hgb goal >7, plt goal >50k  - Transfuse to meet Hgb and plt goals    Patient Follow-up    -PCP within 2 weeks  -Neurology in 6 to 8 weeks after discharge    The patient was discussed with Dr. Castellano.    Hector Cardoso MD  Resident Physician, PGY-1  Department of Neurology    Dragon disclaimer: This documentation was completed with the aid of dictation software.  Please note that there may be some inconsistencies due to software errors.  Errors are corrected in real time, however if there is a remaining error, please do not hesitate to reach out for clarification.     ______________________________________________________    Clinically Significant Risk Factors                                  Medications   Scheduled Meds    amLODIPine  10 mg Oral Daily     atorvastatin  20 mg Oral QPM     insulin aspart  1-7 Units Subcutaneous TID AC     insulin aspart  1-5 Units Subcutaneous At Bedtime     lisinopril  20 mg Oral or Feeding Tube Daily     polyethylene glycol  17 g Oral or Feeding Tube Daily     senna-docusate  2 tablet Oral or Feeding Tube QPM       Infusion Meds      PRN Meds  acetaminophen **OR** [DISCONTINUED] acetaminophen **OR** acetaminophen, artificial tears, glucose **OR** dextrose **OR** glucagon, hydrALAZINE, labetalol, ondansetron, prochlorperazine       PHYSICAL EXAMINATION  Temp:  [97.8  F (36.6  C)-98.9  F (37.2  C)] 97.8  F (36.6  C)  Pulse:  [82-95] 95  Resp:  [16-18] 18  BP: (112-132)/(75-98) 115/79  SpO2:  [96 %-99 %] 98 %      General Exam  General:  patient lying in bed without any acute distress    HEENT:  normocephalic/atraumatic  Cardio:  RRR  Pulmonary:  no respiratory distress  Abdomen:  soft  Extremities:  no edema  Skin:  intact     Neuro Exam  Mental status: Awake, alert, attentive, speech not always understandable per family. Oriented to month, year, self.  Follows commands when commands are asked by family in native language.   Cranial nerves:  PERRL, conjugate gaze, visual fields full on confrontational testing. EOMI, right lower facial droop.    Motor: Normal bulk and tone. No abnormal movements. Full strength in Left hemibody.  Right upper extremity without movement, right lower extremity weak with mild drift.    Sensory: Appears to be intact.   Coordination: Deferred.    Gait: Deferred.    Stroke Scales    NIHSS  1a. Level of Consciousness 0-->Alert, keenly responsive   1b. LOC Questions 0-->Answers both questions correctly   1c. LOC Commands 0-->Performs both tasks correctly   2.   Best Gaze 0-->Normal   3.   Visual 0-->No visual loss   4.   Facial Palsy 1-->Minor paralysis (flattened nasolabial fold, asymmetry on smiling)   5a. Motor Arm, Left 0-->No drift, limb holds 90 (or 45) degrees for full 10 secs   5b. Motor Arm, Right 4-->No movement   6a. Motor Leg, Left 0-->No drift, leg holds 30 degree position for full 5 secs   6b. Motor Leg, right 1-->Drift, leg falls by the end of the 5-sec period but does not hit bed   7.   Limb Ataxia 0-->Absent   8.   Sensory 1-->Mild-to-moderate sensory loss, patient feels pinprick is less sharp or is dull on the affected side, or there is a loss of superficial pain with pinprick, but patient is aware of being touched   9.   Best Language 0-->No aphasia, normal   10. Dysarthria 1-->Mild-to-moderate dysarthria, patient slurs at least some words and, at worst, can be understood with some difficulty   11. Extinction and Inattention  0-->No abnormality   Total 8 (04/13/23 0905)       Modified Hickman Score (Pre-morbid)  0-No deficits    Imaging  I personally reviewed all imaging; relevant findings per HPI.     Lab Results Data   CBC  Recent Labs   Lab 04/13/23  0813 04/12/23  0705 04/11/23  0558   WBC 8.7 8.1 6.4   RBC 5.32* 5.22* 4.86   HGB 13.4 13.0 12.2   HCT 44.0 43.1 40.1    318 289     Basic Metabolic Panel    Recent Labs   Lab 04/13/23  1242 04/13/23  0842 04/13/23  0813 04/12/23  0759 04/12/23  0705  04/11/23  2145 04/11/23  1752 04/11/23  0756 04/11/23  0558   NA  --   --  139  --  138  --   --   --  142   POTASSIUM  --   --  4.0  4.0  --  3.8  --  4.2  --  3.4   CHLORIDE  --   --  105  --  103  --   --   --  106   CO2  --   --  19*  --  20*  --   --   --  21*   BUN  --   --  13.3  --  8.2  --   --   --  6.8   CR  --   --  0.72  --  0.66  --   --   --  0.70   GLC 99 95 99   < > 93   < >  --    < > 88   SALAZAR  --   --  9.6  --  9.5  --   --   --  9.3    < > = values in this interval not displayed.     Liver Panel  No results for input(s): PROTTOTAL, ALBUMIN, BILITOTAL, ALKPHOS, AST, ALT, BILIDIRECT in the last 168 hours.  INR    Recent Labs   Lab Test 04/08/23  0032   INR 0.99      Lipid Profile    Recent Labs   Lab Test 04/08/23  0842   *     A1C    Recent Labs   Lab Test 04/08/23  0842   A1C 6.2*     Troponin    Recent Labs   Lab 04/11/23  1752 04/11/23  1352 04/11/23  0558   CTROPT 88* 95* 111*          Data

## 2023-04-13 NOTE — PLAN OF CARE
Status: Admitted for acute ICH of L basal ganglia 4/8  Vitals: VSS on RA  Neuros: A&Ox4 with choices, RUE 0/5, RLE 3/5, L side 5/5, R droop, slurred speech improving (per family)  IV: PIV SL  Labs/Electrolytes: BG check ACHS, WNL  Diet: Regular, takes pills whole in apple sauce  Bowel status: Loose BM this shift on bedpan   : Incontinent  Skin: Bruising to BUE  Pain: Denies  Activity: Up with heavy assist of 2, stand pivot (PT recommending lift)  Social: Family at bedside, ok'd to stay overnight  Plan: [facility] unable to take pt today d/t staffing, will hopefully go [facility]. Continue POC

## 2023-04-13 NOTE — PLAN OF CARE
BP (!) 114/94 (BP Location: Left arm)   Pulse 93   Temp 98  F (36.7  C) (Axillary)   Resp 16   Wt 52.6 kg (115 lb 15.4 oz)   SpO2 96%   BMI 24.24 kg/m      Goal Outcome Evaluation:      Plan of Care Reviewed With: patient    Overall Patient Progress: improvingOverall Patient Progress: improving     Neuro: A&Ox4 with prompting. Flat affect. R sided numbness- facial, arm and leg. R facial droop. LUE, LLE 4-5/5. RUE 0/5 and RLE 2/5. Soft spoken. Slow to respond. Hmong speaking. Family present.  Cardiac: Afebrile, VSS.   Respiratory: RA   GI/: inc of CHEN/LINDA Bai performed prn. LBM 4/11  Diet/appetite: Tolerating easy chew 7 diet. Poor appetite. Good po fluid intake. Likes meds in applesauce. Denies nausea   Activity: repositioned q2hrs.  Pain: . Denies   Skin: No new deficits noted.  Lines: piv sl'd  Replacement: awaiting am lab results. RN managed replacement protocols in place.  Plan: [facility]    Rested btwn cares. Family present in room. Attentive and participating in cares. Continue to monitor. Notify MD of changes/concerns.      Problem: Risk for Delirium  Goal: Improved Attention and Thought Clarity  Outcome: Not Progressing     Problem: Risk for Delirium  Goal: Improved Sleep  Outcome: Not Progressing     Problem: Plan of Care - These are the overarching goals to be used throughout the patient stay.    Goal: Optimal Comfort and Wellbeing  Outcome: Progressing     Problem: Plan of Care - These are the overarching goals to be used throughout the patient stay.    Goal: Readiness for Transition of Care  Outcome: Progressing     Problem: Stroke, Hemorrhagic  Goal: Effective Communication Skills  Outcome: Not Progressing     Problem: Stroke, Hemorrhagic  Goal: Effective Bowel Elimination  Outcome: Not Progressing

## 2023-04-13 NOTE — DISCHARGE SUMMARY
[facility]    Neurology Stroke Discharge Summary    Date of Admission: 4/8/2023  Date of Discharge: 04/14/2023   Disposition: Discharged to rehabilitation facility  Primary Care Physician: Ara Osman      Admission Diagnosis:   Right sided weakness  HTN    Discharge Diagnosis:   Left Basal Ganglia Intraparenchymal Hemorrhage  HTN  HLD    Problem Leading to Hospitalization (from HPI):   Ms. Mercy Osman is a 52 year old woman with a past medical history of HTN (on PTA amlodipine-benazepril 10-20), history of prior Bell's Palsy admitted on 04/08/23 as a transfer from [facility] for left intracranial hemorrhage.     Per chart review she presented 4/7/23 to [facility] ED via EMS after noted right sided weakness and facial droop. Last known well was 4/7 at 23:47. Per EHR review Ms. Osman was walking downstairs and fell due to sudden onset of right arm and leg weakness. She did not have obvious head trauma and she is not currently on anticoagulation. Stroke code activated upon arrival (see fellow note for full details). She was found to have a left basal ganglia hemorrhage. BP on arrival  To /115. Noted initially to be hypokalemic to 2.6, repleted. Started on a nicardipine gtt which has been off since 4/8.  Currently tolerating lisinopril and amlodipine (reintroduced 4/11) for pressure management.  There have been moderate improvements in RLE, but the RUE remains plegic.  She was deemed a good candidate for acute rehabilitation and is discharging in stable condition on 4/13/23    Please see H&P dated 4/8/2023 for further details about presentation.    Brief Hospital Course:   Patient presented with Right sided weakness      Found to have left IPH    IV thrombolysis was not administered due to hemorrhage      Work-up as stated below under Pertinent Investigations.    Etiology is thought to be hypertensive.      Rehab evaluation: OT, PT and SLP.     Smoking  Cessation: patient is not a smoker    BP Long-term Goal: 140/90 or less, administer PRN antihypertensives for BP > 160 mmHg systolic    Antithrombotic/Anticoagulant Agent: not ordered due to contraindication hemorrhage    Statins: Started on atorvastatin 20mg daily       Hgb A1C Goal: < 7.0    Complications: None.     Other problems addressed during this hospitalization:    #HTN  - Cardiac monitoring during hospitalization, okay to discontinue at discharge  - SBP goal < 160 mmHg   - Hold PTA amlodipine-benazepril, discuss with primary care regarding re-initiation  - Lisinopril 20mg daily  - continue amlodipine 10mg daily     #Troponin elevation, asymptomatic, spontaneously resolved  EKG wnl.  No cardiogenic symptoms, suspect transient stress related troponin leak. No intervention performed. Our plan was to intervene with repeat EKG and further work up if the pt expressed cardiogenic symptoms, which she did not.  Troponin improved spontaneously without intervention.  - Trend 32 - 40 - 45 - 50 - 51 - 84 - 100 - 113 (peak) - 111 - 95 - 88  - EKG without obvious acute ischemic changes   - Echocardiogram WNL     #Hypokalemia, resolved, received replacement  #Hyperchloridemia, resolved with adequate hydration   - Daily BMP  - IV fluids: Plasmalyte 50 ml/hour, discontinued on 4/12  - Electrolyte replacement protocol was ordered during hospitalization     #Pre-diabetes   - Hgb A1c 6.2% 4/8/2023   - TSH 3.45   - Monitor with surveillance with primary care outpatient     #Anemia, chronic   - Daily CBC done in hospital  - Hgb goal >7, plt goal >50k  - No transfusions needed during hospitalization    PERTINENT INVESTIGATIONS    Labs  Lipid Panel: Recent Labs   Lab Test 04/08/23  0842   *     A1C:   Lab Results   Component Value Date    A1C 6.2 04/08/2023     INR:   Recent Labs   Lab 04/08/23  0032   INR 0.99      Coag Panel / Hypercoag Workup: Not indicated  Pending test results: None    MRI/Head CT  CT head 4/8/2023  12:31 AM-  1. 2.7 x 1.4 x 2.5 cm acute hemorrhage in the left thalamus/left internal capsule with involvement of the left basal ganglia. No intraventricular extension. This is typical of a hypertensive bleed.  2. Slight left to right bowing of the midline structures.   CT head 4/8/2023 9:48 AM-  Stable intraparenchymal hemorrhage centered at the left  thalamus with associated mass effect upon the left lateral ventricle.  No new intracranial hemorrhage or other acute intracranial findings.  CT head 4/28/2023 9:09 PM-  Unchanged intraparenchymal hemorrhage centered in the left thalamus with associated mass effect. No new hemorrhage or acute intracranial finding.   Intracranial Vasculature HEAD CTA:  ANTERIOR CIRCULATION: Scattered atheromatous disease. No stenosis/occlusion, aneurysm, or high flow vascular malformation. Fetal origin of the right posterior cerebral artery from the anterior circulation.     POSTERIOR CIRCULATION: No stenosis/occlusion, aneurysm, or high flow vascular malformation. Balanced vertebral arteries supply a normal basilar artery.      DURAL VENOUS SINUSES: Not well evaluated on a technical basis   Cervical Vasculature NECK CTA:  RIGHT CAROTID: No measurable stenosis or dissection.     LEFT CAROTID: No measurable stenosis or dissection.     VERTEBRAL ARTERIES: No focal stenosis or dissection. Balanced vertebral arteries.     AORTIC ARCH: Classic aortic arch anatomy with no significant stenosis at the origin of the great vess      Echocardiogram Left ventricular function is decreased. The ejection fraction is 50-55%  (borderline). Mild to moderate concentric wall thickening consistent with left  ventricular hypertrophy is present.  Global right ventricular function is normal.  No significant valvular abnormalities present.  Estimated mean right atrial pressure is normal.  Ascending aorta dilated at 4.4 cm.  Trivial pericardial effusion is present.   EKG/Telemetry Sinus rhythm with sinus  arrhythmia   Voltage criteria for left ventricular hypertrophy ( R in aVL , Sokolow-Jackson , [place] product )   ST & T wave abnormality, consider lateral ischemia   Prolonged QT   Abnormal ECG    Other Testing Not Applicable       LDL  4/8/2023: 142 mg/dL   A1C  4/8/2023: 6.2 %   Troponin 4/11/2023: 88 ng/L       Endovascular procedure: None     Cardiac Monitoring: Patient had > 24 hrs of cardiac monitor while in hospital.    Findings: No atrial fibrillation was found.    Sleep Apnea Screen:   Questions/Answers      1. Prior to your stroke, have you been told that you snore? No.    2. Prior to your stroke, have you been told that you struggle to breath while you are sleeping? No.    3. Prior to your stroke, do you feel tired and sleepy even after getting a normal night of sleep? Yes.    Sleep Apnea Screen Findings: Patient has 0-1 symptoms of sleep apnea.  Further sleep study is not recommended at this time.    PHQ-9 Depression Screen Score: 8    Education discussed with: patient and family on blood pressure management, cholesterol management and medical management.    During daily rounds, the plan of care was discussed and developed with patient, spouse, child and family.  Plan of care includes: Disposition, blood pressure management, expectant recovery..    PHYSICAL EXAMINATION  Vital Signs:  B/P: 129/98, T: 98, P: 93, R: 16    General:  patient lying in bed without any acute distress    HEENT:  normocephalic/atraumatic  Cardio:  RRR  Pulmonary:  no respiratory distress  Abdomen:  soft  Extremities:  no edema  Skin:  intact      Neuro Exam  Mental status: Awake, alert, attentive, speech not always understandable per family. Oriented to month, year, self.  Follows commands when commands are asked by family in native language.   Cranial nerves: PERRL, conjugate gaze, visual fields full on confrontational testing. EOMI, right lower facial droop.    Motor: Normal bulk and tone. No abnormal movements. Full strength in  Left hemibody.  Right upper extremity without movement, right lower extremity weak with mild drift.    Sensory: Appears to be intact.   Coordination: Deferred.    Gait: Deferred.    National Institutes of Health Stroke Scale (on day of discharge)  NIHSS Total Score: 8    Modified Rod Score (Discharge)  4-Moderately severe disability; unable to walk without assistance and unable to attend to own bodily    Medications    Current Discharge Medication List      START taking these medications    Details   amLODIPine (NORVASC) 10 MG tablet Take 1 tablet (10 mg) by mouth daily  Qty: 90 tablet, Refills: 0    Associated Diagnoses: Hypertension, unspecified type; Nontraumatic subcortical hemorrhage of left cerebral hemisphere (H)      atorvastatin (LIPITOR) 20 MG tablet Take 1 tablet (20 mg) by mouth every evening  Qty: 30 tablet, Refills: 0    Associated Diagnoses: Nontraumatic subcortical hemorrhage of left cerebral hemisphere (H); Hyperlipidemia, unspecified hyperlipidemia type      lisinopril (ZESTRIL) 20 MG tablet 1 tablet (20 mg) by Oral or Feeding Tube route daily  Qty: 90 tablet, Refills: 0    Associated Diagnoses: Hypertension, unspecified type; Nontraumatic subcortical hemorrhage of left cerebral hemisphere (H)         STOP taking these medications       amLODIPine-benazepril (LOTREL) 10-20 MG capsule Comments:   Reason for Stopping:               Additional recommendations and follow up:       Specialty Care Coordination Referral      General info for SNF    Length of Stay Estimate: Short Term Care: Estimated # of Days <30  Condition at Discharge: Improving  Level of care:skilled   Rehabilitation Potential: Good  Admission H&P remains valid and up-to-date: Yes  Recent Chemotherapy: N/A  Use Nursing Home Standing Orders: Yes     Follow Up and recommended labs and tests    Follow up with primary care provider within two weeks after discharge from rehab.    Follow up with neurology 6-8 weeks after your discharge  from the hospital.  A referral has been placed on your behalf.     Reason for your hospital stay    You were in the hospital for a bleed in the left hemisphere of your brain.     Additional Discharge Instructions    Please return to the emergency department IMMEDIATELY if you experience any of the following:  - Sudden onset weakness or numbness in one side of your body when compared to the other.  - Sudden onset difficulty speaking.  - Sudden onset severe headache.  - Sudden onset blindness or other visual abnormalities.  - Episodes of unconsciousness, rhythmic/jerking motions, or other seizure like activity.     Activity - Up ad catrachita     Full Code     Physical Therapy Adult Consult    Evaluate and treat as clinically indicated.    Reason:  Left intraparenchymal hemorrhage.     Occupational Therapy Adult Consult    Evaluate and treat as clinically indicated.    Reason:  Left intraparenchymal hemorrhage.     Speech Language Path Adult Consult    Evaluate and treat as clinically indicated.    Reason:  Left intraparenchymal hemorrhage.     Fall precautions     Diet    Follow this diet upon discharge: Orders Placed This Encounter      Easy to Chew Diet (level 7) Thin Liquids (level 0)     Stroke Hospital Follow Up    Please be aware that coverage of these services is subject to the terms and limitations of your health insurance plan.  Call member services at your health plan with any benefit or coverage questions.  [email or web address] will call you to coordinate care as prescribed by your provider. If you don t hear from a representative within 2 business days, please call [phone].         Patient was seen and discussed with the Attending, Dr. Gray Reed, DO  Resident Physician, PGY-2  Department of Neurology    Dragon disclaimer: This documentation was completed with the aid of dictation software.  Please note that there may be some inconsistencies due to software errors.  Errors are corrected in real  time, however if there is a remaining error, please do not hesitate to reach out for clarification.

## 2023-04-14 ENCOUNTER — APPOINTMENT (OUTPATIENT)
Dept: PHYSICAL THERAPY | Facility: CLINIC | Age: 53
End: 2023-04-14
Attending: PSYCHIATRY & NEUROLOGY
Payer: COMMERCIAL

## 2023-04-14 ENCOUNTER — APPOINTMENT (OUTPATIENT)
Dept: INTERPRETER SERVICES | Facility: CLINIC | Age: 53
End: 2023-04-14
Attending: PHYSICAL MEDICINE & REHABILITATION
Payer: COMMERCIAL

## 2023-04-14 ENCOUNTER — APPOINTMENT (OUTPATIENT)
Dept: ULTRASOUND IMAGING | Facility: CLINIC | Age: 53
End: 2023-04-14
Attending: PHYSICIAN ASSISTANT
Payer: COMMERCIAL

## 2023-04-14 ENCOUNTER — APPOINTMENT (OUTPATIENT)
Dept: OCCUPATIONAL THERAPY | Facility: CLINIC | Age: 53
End: 2023-04-14
Attending: PSYCHIATRY & NEUROLOGY
Payer: COMMERCIAL

## 2023-04-14 ENCOUNTER — HOSPITAL ENCOUNTER (INPATIENT)
Facility: CLINIC | Age: 53
LOS: 21 days | Discharge: HOME | End: 2023-05-05
Attending: PHYSICAL MEDICINE & REHABILITATION | Admitting: PHYSICAL MEDICINE & REHABILITATION
Payer: COMMERCIAL

## 2023-04-14 VITALS
HEART RATE: 77 BPM | BODY MASS INDEX: 24.24 KG/M2 | SYSTOLIC BLOOD PRESSURE: 106 MMHG | DIASTOLIC BLOOD PRESSURE: 73 MMHG | TEMPERATURE: 98.1 F | RESPIRATION RATE: 16 BRPM | OXYGEN SATURATION: 96 % | WEIGHT: 115.96 LBS

## 2023-04-14 DIAGNOSIS — I10 BENIGN ESSENTIAL HYPERTENSION: ICD-10-CM

## 2023-04-14 DIAGNOSIS — E78.5 HYPERLIPIDEMIA, UNSPECIFIED HYPERLIPIDEMIA TYPE: ICD-10-CM

## 2023-04-14 DIAGNOSIS — I61.0 NONTRAUMATIC SUBCORTICAL HEMORRHAGE OF LEFT CEREBRAL HEMISPHERE (H): Primary | ICD-10-CM

## 2023-04-14 LAB
ANION GAP SERPL CALCULATED.3IONS-SCNC: 13 MMOL/L (ref 7–15)
BUN SERPL-MCNC: 12.4 MG/DL (ref 6–20)
CALCIUM SERPL-MCNC: 9.6 MG/DL (ref 8.6–10)
CHLORIDE SERPL-SCNC: 106 MMOL/L (ref 98–107)
CREAT SERPL-MCNC: 0.76 MG/DL (ref 0.51–0.95)
DEPRECATED HCO3 PLAS-SCNC: 20 MMOL/L (ref 22–29)
ERYTHROCYTE [DISTWIDTH] IN BLOOD BY AUTOMATED COUNT: 16.7 % (ref 10–15)
GFR SERPL CREATININE-BSD FRML MDRD: >90 ML/MIN/1.73M2
GLUCOSE BLDC GLUCOMTR-MCNC: 97 MG/DL (ref 70–99)
GLUCOSE SERPL-MCNC: 107 MG/DL (ref 70–99)
HCT VFR BLD AUTO: 45.1 % (ref 35–47)
HGB BLD-MCNC: 13.7 G/DL (ref 11.7–15.7)
MAGNESIUM SERPL-MCNC: 2.1 MG/DL (ref 1.7–2.3)
MCH RBC QN AUTO: 25.2 PG (ref 26.5–33)
MCHC RBC AUTO-ENTMCNC: 30.4 G/DL (ref 31.5–36.5)
MCV RBC AUTO: 83 FL (ref 78–100)
PHOSPHATE SERPL-MCNC: 5.1 MG/DL (ref 2.5–4.5)
PLATELET # BLD AUTO: 340 10E3/UL (ref 150–450)
POTASSIUM SERPL-SCNC: 4 MMOL/L (ref 3.4–5.3)
RBC # BLD AUTO: 5.44 10E6/UL (ref 3.8–5.2)
SODIUM SERPL-SCNC: 139 MMOL/L (ref 136–145)
WBC # BLD AUTO: 7.5 10E3/UL (ref 4–11)

## 2023-04-14 PROCEDURE — 250N000013 HC RX MED GY IP 250 OP 250 PS 637: Performed by: PHYSICIAN ASSISTANT

## 2023-04-14 PROCEDURE — 36415 COLL VENOUS BLD VENIPUNCTURE: CPT | Performed by: NURSE PRACTITIONER

## 2023-04-14 PROCEDURE — 128N000003 HC R&B REHAB

## 2023-04-14 PROCEDURE — 97530 THERAPEUTIC ACTIVITIES: CPT | Mod: GP

## 2023-04-14 PROCEDURE — 82310 ASSAY OF CALCIUM: CPT | Performed by: NURSE PRACTITIONER

## 2023-04-14 PROCEDURE — 93971 EXTREMITY STUDY: CPT | Mod: RT

## 2023-04-14 PROCEDURE — 85027 COMPLETE CBC AUTOMATED: CPT | Performed by: NURSE PRACTITIONER

## 2023-04-14 PROCEDURE — 250N000013 HC RX MED GY IP 250 OP 250 PS 637

## 2023-04-14 PROCEDURE — 97110 THERAPEUTIC EXERCISES: CPT | Mod: GO

## 2023-04-14 PROCEDURE — 93971 EXTREMITY STUDY: CPT | Mod: 26 | Performed by: RADIOLOGY

## 2023-04-14 PROCEDURE — 84100 ASSAY OF PHOSPHORUS: CPT | Performed by: NURSE PRACTITIONER

## 2023-04-14 PROCEDURE — 99222 1ST HOSP IP/OBS MODERATE 55: CPT | Mod: AI | Performed by: PHYSICIAN ASSISTANT

## 2023-04-14 PROCEDURE — 97116 GAIT TRAINING THERAPY: CPT | Mod: GP

## 2023-04-14 PROCEDURE — 99239 HOSP IP/OBS DSCHRG MGMT >30: CPT | Mod: GC | Performed by: PSYCHIATRY & NEUROLOGY

## 2023-04-14 PROCEDURE — 97112 NEUROMUSCULAR REEDUCATION: CPT | Mod: GO

## 2023-04-14 PROCEDURE — 83735 ASSAY OF MAGNESIUM: CPT | Performed by: NURSE PRACTITIONER

## 2023-04-14 RX ORDER — HYDRALAZINE HYDROCHLORIDE 10 MG/1
10 TABLET, FILM COATED ORAL 4 TIMES DAILY PRN
Status: DISCONTINUED | OUTPATIENT
Start: 2023-04-14 | End: 2023-05-05 | Stop reason: HOSPADM

## 2023-04-14 RX ORDER — ACETAMINOPHEN 325 MG/1
650 TABLET ORAL EVERY 4 HOURS PRN
Status: DISCONTINUED | OUTPATIENT
Start: 2023-04-14 | End: 2023-05-05 | Stop reason: HOSPADM

## 2023-04-14 RX ORDER — POLYETHYLENE GLYCOL 3350 17 G/17G
17 POWDER, FOR SOLUTION ORAL DAILY
Status: DISCONTINUED | OUTPATIENT
Start: 2023-04-15 | End: 2023-05-04

## 2023-04-14 RX ORDER — ATORVASTATIN CALCIUM 10 MG/1
20 TABLET, FILM COATED ORAL EVERY EVENING
Status: DISCONTINUED | OUTPATIENT
Start: 2023-04-14 | End: 2023-05-05 | Stop reason: HOSPADM

## 2023-04-14 RX ORDER — AMLODIPINE BESYLATE 10 MG/1
10 TABLET ORAL DAILY
Status: DISCONTINUED | OUTPATIENT
Start: 2023-04-15 | End: 2023-05-04

## 2023-04-14 RX ORDER — LISINOPRIL 20 MG/1
20 TABLET ORAL DAILY
Status: DISCONTINUED | OUTPATIENT
Start: 2023-04-15 | End: 2023-05-05 | Stop reason: HOSPADM

## 2023-04-14 RX ORDER — AMOXICILLIN 250 MG
2 CAPSULE ORAL AT BEDTIME
Status: DISCONTINUED | OUTPATIENT
Start: 2023-04-14 | End: 2023-05-05 | Stop reason: HOSPADM

## 2023-04-14 RX ADMIN — LISINOPRIL 20 MG: 20 TABLET ORAL at 08:39

## 2023-04-14 RX ADMIN — ATORVASTATIN CALCIUM 20 MG: 10 TABLET, FILM COATED ORAL at 20:49

## 2023-04-14 RX ADMIN — SENNOSIDES AND DOCUSATE SODIUM 2 TABLET: 50; 8.6 TABLET ORAL at 20:49

## 2023-04-14 RX ADMIN — AMLODIPINE BESYLATE 10 MG: 10 TABLET ORAL at 08:39

## 2023-04-14 ASSESSMENT — ACTIVITIES OF DAILY LIVING (ADL)
ADLS_ACUITY_SCORE: 27
ADLS_ACUITY_SCORE: 27
CHANGE_IN_FUNCTIONAL_STATUS_SINCE_ONSET_OF_CURRENT_ILLNESS/INJURY: YES
ADLS_ACUITY_SCORE: 27
DOING_ERRANDS_INDEPENDENTLY_DIFFICULTY: YES
DRESSING/BATHING_DIFFICULTY: NO
HEARING_DIFFICULTY_OR_DEAF: NO
ADLS_ACUITY_SCORE: 28
WALKING_OR_CLIMBING_STAIRS_DIFFICULTY: NO
DIFFICULTY_EATING/SWALLOWING: NO
WEAR_GLASSES_OR_BLIND: NO
ADLS_ACUITY_SCORE: 31
ADLS_ACUITY_SCORE: 27
TOILETING_ISSUES: NO
CONCENTRATING,_REMEMBERING_OR_MAKING_DECISIONS_DIFFICULTY: YES
ADLS_ACUITY_SCORE: 27
NUMBER_OF_TIMES_PATIENT_HAS_FALLEN_WITHIN_LAST_SIX_MONTHS: 1
ADLS_ACUITY_SCORE: 27
ADLS_ACUITY_SCORE: 27
DIFFICULTY_COMMUNICATING: YES
ADLS_ACUITY_SCORE: 27
ADLS_ACUITY_SCORE: 27
FALL_HISTORY_WITHIN_LAST_SIX_MONTHS: YES
ADLS_ACUITY_SCORE: 23

## 2023-04-14 ASSESSMENT — PATIENT HEALTH QUESTIONNAIRE - PHQ9: SUM OF ALL RESPONSES TO PHQ QUESTIONS 1-9: 8

## 2023-04-14 NOTE — H&P
[facility]   Acute Rehabilitation Unit  Admission History and Physical    CHIEF COMPLAINT   Stroke Vascular Hemorrhagic 01.2 (R) Body Involvement (L) Brain -  left basal ganglia hemorrhage     HISTORY OF PRESENT ILLNESS  Mercy Osman is a 52 year old right hand dominant female with past medical history of hypertension and Bell's palsy who presented to [facility] on 4/8/23 after fall secondary to acute onset of right hemiparesis, additionally with right facial droop.  Labs remarkable for hypokalemia (2.6), hyperglycemia, mild anemia. BP elevated in ED to 227/115.  CT head revealed acute hemorrhage in left thalamus/left internal capsule with involvement of left basal ganglia.  Neurology was consulted and patient was transferred to [facility] for further evaluation and management.    BP initially managed with nicardipine drip (off since 4/8), transitioned to oral lisinopril and amlodipine.    Additional stroke work-up with EKG with sinus rhythm/arrhythmia, LVH, ST & T-wave abnormality; echo with LVEF 50-55% with left ventricular hypertrophy, dilated ascending aorta; A1c 6.4% (pre-diabetes); and .  Started on statin therapy.    She was noted to have some worsening stroke deficits on 4/9/23, repeat CT head demonstrating stability of hemorrhage with mild increase in bree-hematoma edema, likely responsible for change in presentation per neurology.    Hospital course was further complicated by hypokalemia (resolved s/p replacement), elevated troponin (resolved without intervention, felt to be demand ischemia), and loose stools.    During acute hospitalization, patient was seen and evaluated by PT and OT, who collectively recommended that patient would benefit from ongoing therapies in the acute inpatient rehabilitation setting.      In review of the therapy notes, patient is currently needing mod Ax2 for supine>sit, min Ax2 with sit to stand transfers.  She is able to perform  "pivot transfer from edge of bed to chair with max Ax2.  She has ambulated 8'x2 with mod Ax2.  She needs set-up to SBA for seated grooming/hygiene.    Upon arrival to the rehab unit, patient is accompanied by her spouse and two adult children who assist in providing historical details.  Patient reports weakness on right side, especially her arm.  She also notes speech to be slower and needs to pause and think before she can speak.  She reports intermittent headache.  Also notes vision seems \"blurry\" since her stroke.  Endorses numbness and tingling but is unable to specify where.  She also notes some difficulty swallowing.  Also endorses confusion, changes in cognition.  Family reports patient seems to have decreased appetite and has preferred to eat softer foods since stroke, mainly eating cream of wheat or applesauce.  Family states last BM was yesterday.  She reports to be sleeping well.  States she is feeling \"stressed\".    PAST MEDICAL HISTORY   Reviewed and updated in Epic.  No past medical history on file.    SURGICAL HISTORY  Reviewed and updated in Epic.  No past surgical history on file.    SOCIAL HISTORY  Reviewed and updated in Epic.  Marital Status:   Living situation: lives in house with spouse and adult children (2 daughters, 1 son).  1 stair to enter, 12 stairs within to basement but all needs can be met on main level.  Family support: Daughter is able to provide 24/7 support on discharge. She is interested in becoming the patient's PCA.   Vocational History: not working  Tobacco use: none  Alcohol use: none  Illicit drug use: none  Social History     Socioeconomic History     Marital status:      Spouse name: Not on file     Number of children: Not on file     Years of education: Not on file     Highest education level: Not on file   Occupational History     Not on file   Tobacco Use     Smoking status: Not on file     Smokeless tobacco: Not on file   Vaping Use     Vaping status: Not " "on file   Substance and Sexual Activity     Alcohol use: Not on file     Drug use: Not on file     Sexual activity: Not on file   Other Topics Concern     Not on file   Social History Narrative     Not on file     Social Determinants of Health     Financial Resource Strain: Not on file   Food Insecurity: Not on file   Transportation Needs: Not on file   Physical Activity: Not on file   Stress: Not on file   Social Connections: Not on file   Intimate Partner Violence: Not on file   Housing Stability: Not on file       FAMILY HISTORY  Reviewed and updated in Epic.  No family history on file.      PRIOR FUNCTIONAL HISTORY   Pt was independent with all ADLs/IADLs, transfers, mobility and gait.      MEDICATIONS  Scheduled meds  Medications Prior to Admission   Medication Sig Dispense Refill Last Dose     amLODIPine (NORVASC) 10 MG tablet Take 1 tablet (10 mg) by mouth daily 90 tablet 0      atorvastatin (LIPITOR) 20 MG tablet Take 1 tablet (20 mg) by mouth every evening 30 tablet 0      lisinopril (ZESTRIL) 20 MG tablet 1 tablet (20 mg) by Oral or Feeding Tube route daily 90 tablet 0        ALLERGIES   No Known Allergies      REVIEW OF SYSTEMS  A 10 point ROS was performed and negative unless otherwise noted in HPI.     Constitutional: Negative for fever/chills.  Eyes: Positive for \"blurry\" vision.  Ears, Nose, Throat: Negative for sore throat, nasal congestion.  Cardiovascular: Negative for chest pain, palpitations.  Respiratory: Negative for shortness of breath, cough.  Gastrointestinal: Positive for decreased appetite.  Negative for abdominal pain, nausea, vomiting, diarrhea, constipation.  Genitourinary: Negative for dysuria, urgency, frequency, incontinence.  Musculoskeletal: Positive for intermittent body aches.  Neurologic: Positive for right-sided weakness, numbness, slurred/slow speech, word-finding difficulty, impaired cognition, impaired swallow, intermittent headache, intermittent dizziness.  Psychiatric: " "Positive for feeling \"stressed\".  Negative for sleep disturbance.      PHYSICAL EXAM  VITAL SIGNS:  /69 (BP Location: Left arm, Patient Position: Supine, Cuff Size: Adult Regular)   Pulse 90   Temp 97.8  F (36.6  C) (Oral)   Resp 18   Ht 1.473 m (4' 10\")   Wt 50.5 kg (111 lb 5.3 oz)   SpO2 95%   BMI 23.27 kg/m    BMI:  Estimated body mass index is 23.27 kg/m  as calculated from the following:    Height as of this encounter: 1.473 m (4' 10\").    Weight as of this encounter: 50.5 kg (111 lb 5.3 oz).     General: NAD, seated upright at edge of bed  HEENT: NC/AT, MMM  Pulmonary: non-labored on room air, lungs CTA bilaterally  Cardiovascular: RRR, no murmurs  Abdominal: soft, non-tender, non-distended, bowel sounds present  Extremities: warm, well perfused, no edema in bilateral lower extremities, no tenderness in calves   MSK/neuro:   Mental Status:  Alert, oriented to general location (hospital) and situation with cues, disoriented to date (said March 3rd)   Cranial Nerves:   1. 2nd CN: Pupils equal, round, reactive to light.  Right visual neglect.  2. 3rd,4th,6th CN:  EOMI  3. 5th CN: facial sensation intact   4. 7th CN: right lower facial droop  5. 8th CN: functional hearing bilaterally  6. 9th, 10th CN: palate elevates symmetrically   7. 11th CN: sternocleidomastoids and trapezii strong   8. 12th CN: tongue midline and without fasciculations     Sensory: absent to light touch in right upper and lower extremities   Strength:   SF  EF  EE  WE  G  HF  KE  DF  PF   R  0 0 0 0 0 2 4- 4 4-  L  5 5 5 5 5 5 5 5 5    Calvillo's test: negative bilaterally   Tone per modified Gopal Scale: none   Abnormal movements: None   Coordination: unable to test finger to nose on right due to weakness   Speech: dysarthria, word-finding difficulty, difficult naming common items, slowed (per  and family)   Cognition: some confusion noted, difficulty following commands   Gait: not tested  Skin: no gross " abnormalities on visible skin      LABS  CBC RESULTS: Recent Labs   Lab Test 04/14/23  0803 04/13/23  0813 04/12/23  0705   WBC 7.5 8.7 8.1   RBC 5.44* 5.32* 5.22*   HGB 13.7 13.4 13.0   HCT 45.1 44.0 43.1   MCV 83 83 83   MCH 25.2* 25.2* 24.9*   MCHC 30.4* 30.5* 30.2*   RDW 16.7* 16.9* 16.7*    345 318     Last Basic Metabolic Panel:  Recent Labs   Lab Test 04/14/23  0828 04/14/23  0803 04/13/23  2143 04/13/23  0842 04/13/23  0813 04/12/23  0759 04/12/23  0705   NA  --  139  --   --  139  --  138   POTASSIUM  --  4.0  --   --  4.0  4.0  --  3.8   CHLORIDE  --  106  --   --  105  --  103   CO2  --  20*  --   --  19*  --  20*   ANIONGAP  --  13  --   --  15  --  15   GLC 97 107* 186*   < > 99   < > 93   BUN  --  12.4  --   --  13.3  --  8.2   CR  --  0.76  --   --  0.72  --  0.66   GFRESTIMATED  --  >90  --   --  >90  --  >90   SALAZAR  --  9.6  --   --  9.6  --  9.5    < > = values in this interval not displayed.     Hemoglobin A1C   Date Value Ref Range Status   04/08/2023 6.2 (H) <5.7 % Final     Comment:     Normal <5.7%   Prediabetes 5.7-6.4%    Diabetes 6.5% or higher     Note: Adopted from ADA consensus guidelines.     Recent Labs   Lab Test 04/08/23  0842   *     EXAM: CTA HEAD NECK W CONTRAST  LOCATION: [facility]  DATE/TIME: 4/8/2023 12:31 AM  IMPRESSION:   HEAD CT:  1. 2.7 x 1.4 x 2.5 cm acute hemorrhage in the left thalamus/left internal capsule with involvement of the left basal ganglia. No intraventricular extension. This is typical of a hypertensive bleed.  2. Slight left to right bowing of the midline structures.   HEAD CTA: No stenosis/occlusion, aneurysm, or high flow vascular malformation.  NECK CTA: No measurable stenosis or dissection.    CT HEAD W/O CONTRAST 4/8/2023 9:48 AM  Impression: Stable intraparenchymal hemorrhage centered at the left thalamus with associated mass effect upon the left lateral ventricle. No new intracranial hemorrhage or other acute  intracranial findings.    EXAM: CT HEAD W/O CONTRAST  4/8/2023 9:09 PM   IMPRESSION: Unchanged intraparenchymal hemorrhage centered in the left thalamus with associated mass effect. No new hemorrhage or acute intracranial finding.    EXAM: CT HEAD W/O CONTRAST  4/11/2023 6:11 PM   IMPRESSION:   Unchanged intraparenchymal hemorrhage in the left thalamus with minimal mass effect and midline shift. No new acute intracranial findings.      IMPRESSION/PLAN:  Mercy Osman is a 52 year old right hand dominant female with a past medical history of hypertension, [place]' palsy who was admitted on 4/8/23 with spontaneous acute left thalamic intracranial hemorrhage with hospital course complicated by hyperlipidemia, prediabetes, elevated troponin, anemia, hypokalemia, and loose stools.  She is now admitted to ARU on 4/14/23 for multidisciplinary rehabilitation and ongoing medical management.      Admission to acute inpatient rehab 4/14/23.    Impairment group code: Stroke Vascular Hemorrhagic 01.2 (R) Body Involvement (L) Brain -  left basal ganglia hemorrhage      1. PT, OT and SLP 60 minutes of each on a daily basis, in addition to rehab nursing and close management of physiatrist.      2. Impairment of ADL's: Noted to have impaired activity tolerance, impaired balance, impaired coordination, impaired ROM, impaired sensation, impaired strength w/ R hemiparesis, impaired motor control, fatigue, dizziness, and impaired weight shifting, all affecting her ability to safely and independently perform basic ADLs.  Goal for min A or less with basic ADLs.    3. Impairment of mobility:  Noted to have impaired activity tolerance, impaired balance, impaired coordination, impaired ROM, impaired sensation, impaired strength w/ R hemiparesis, impaired motor control, fatigue, dizziness, and impaired weight shifting, all affecting her ability to safely and independently perform basic mobility.  Goal for min A or less with basic  mobility.    4. Impairment of cognition/language/swallow:  Noted to have dysarthria and dysphagia, and would benefit from full cognitive-linguistic evaluation at ARU with goals for improved cognitive-linguistic skills to meet basic needs and safe tolerance of least restrictive diet.    5. Medical Conditions    Left basal ganglia intraparenchymal hemorrhage, spontaneous, likely secondary to uncontrolled hypertension  Vasogenic cerebral edema  Deficits: right hemiparesis, right facial droop, right hemisensory deficit, right visual neglect, dysarthria, ?aphasia, dysphagia, suspect impaired cognition  - Long-term BP goal 140/90 or less, management as below  - Stroke PLC completed 4/10 (during inpatient stay)  - Continue PT/OT/SLP  - Follow up with neurology 6-8 weeks after your discharge     Hypertension  PTA on amlodipine-benazepril 10-20 mg capsule.  BP elevated to 220s/100s at presentation.  Initially managed on nicardipine drip, then transitioned to PO lisinopril + amlodipine.  - Long-term BP goal 140/90 or less  - Continue Lisinopril 20mg daily  - Continue amlodipine 10mg daily  - PRN hydralazine for SBP >160  - Monitor BP, adjust regimen as indicated    Hyperlipidemia  Started on statin therapy this admission with .  - Continue atorvastatin 20 mg daily     Troponin elevation, asymptomatic, spontaneously resolved  EKG without obvious acute ischemic changes.  Echo WNL.  No cardiogenic symptoms, suspect transient stress related troponin leak.  Peaked to 113 on 4/10.  Troponin improved spontaneously without intervention.  - Further work-up if any cardiogenic symptoms develop     Hypokalemia, resolved s/p replacement  Hyperchloremia, resolved with adequate hydration   - Trend BMP every Monday  - Replete as needed per protocol     Pre-diabetes   Hgb A1c 6.2% 4/8/2023   - Monitor with surveillance with primary care outpatient  - No routine BG checks indicated, monitor periodically with BMP     Anemia,  chronic   Hgb 11.5 on presentation.  WNL at 13.4 as of 4/13.  - Hgb goal >7, plt goal >50k  - Trend CBC every Monday    6. Adjustment to disability:  Clinical psychology to eval and treat if indicated  7. FEN: easy to chew (level 7) diet, thin liquids  8. Bowel: continent, recent loose stools, monitor, PRN and scheduled bowel meds available  9. Bladder: continent/incontinent, monitor PVRs at admission  10. DVT Prophylaxis: mechanical  11. GI Prophylaxis: not indicated  12. Code: full  13. Disposition: goal for home with family support  14. ELOS:  12 days  15. Rehab prognosis:  good  16. Follow up Appointments on Discharge: PCP in 1-2 weeks, neurology in 6-8 weeks after discharge        Patient was discussed with Dr. Jaden Mcclendon, PM&R staff physician     Fabi Virgen PA-C  Physical Medicine & Rehabilitation

## 2023-04-14 NOTE — PROGRESS NOTES
Rehab Admissions:  I met with Mercy and her daughter Kayli this AM to discuss [facility]. Discussed setup and structure of [facility] level of care, ELOS 2 weeks, w/ skilled PT/OT/SLP. Daughter Kayli has a visitor exception on 6a and this will be continued ton ARC. Discussed anticipated ongoing OP therapy upon disch from [facility].     Thank you for the referral, we will continue to follow this patient for post acute placement.     Determination of admission is based upon the patient's need for an intensive, interdisciplinary approach to rehabilitation, their ability to progress, their ability to tolerate intensive therapies, their need for daily physician supervision, their need for twenty four hour nursing assistance, and their ability and willingness to participate in such a program.    Fadumo Le CM  Rehab Liaison/  [facility]  4/14/2023    12:38 PM

## 2023-04-14 NOTE — PLAN OF CARE
Speech Language Therapy Discharge Summary    Reason for therapy discharge:    Discharged to acute rehabilitation facility.    Progress towards therapy goal(s). See goals on Care Plan in [facility] electronic health record for goal details.  Goals partially met.  Barriers to achieving goals:   discharge from facility.    Therapy recommendation(s):    Continued therapy is recommended.  Rationale/Recommendations:  Continued ST for dysphagia and cog/linguistic skills.    From a swallowing standpoint, pt is approrpriate to continue regular diet and thin liquids with supervision, however pt's family requesting a soft food diet. Thus, recommend easy to chew diet (7) and thin liquids. Please monitor for R sided oral residuals.

## 2023-04-14 NOTE — PLAN OF CARE
Status: Admitted for acute ICH of L basal ganglia 4/8  Vitals: VSS on RA  Neuros: A&Ox4 with choices, RUE 0/5, RLE 3/5, L side 5/5, R droop, slurred speech improving (per family)  IV: PIV SL  Labs/Electrolytes: BG check ACHS, WNL  Diet: Regular, takes pills whole in apple sauce  Bowel status: Loose BM 4/13  : Incontinent  Skin: Bruising to BUE  Pain: Denies  Activity: Up with heavy assist of 2, stand pivot  Social: Family at bedside, ok'd to stay overnight  Plan: Transport picked up pt @ 9089 to take to [facility], report given, PIV removed

## 2023-04-14 NOTE — PLAN OF CARE
Status: Admitted for acute intracranial hemorrhage (ICH) of left basal ganglia. Mechanism of this ICH is thought to be poorly controlled HTN. Hx: HTN, chronic anemia, and prior Bell's palsy.   Vitals: VSS on RA, continuous pulse ox in place.   Neuros: Hmong speaking, daughter translating at bedside. A&Ox4. Per daughter mild WFD. R. Droop. R. Facial/UE/LE numbness. RUE 0/5 with no response to stimuli. RLE 3/5. LUE/LLE 4-5/5.   IV: PIV, SL   Labs/Electrolytes: WNL  Resp/trach: LS clear  Diet: Regular. Takes pills whole with applesauce   Bowel status: No BM this shift, LBM 4/13 diarrhea.  : Voiding w/ incontinence, brief in place   Pain: Generalized body aches managed with PRN tylenol   Activity: A2/lift. Turn/repo. HOB 30 degrees. PPX: DVT - SCDs (pharm contraindicated due to bleed)  Social: Family at bedside for support and .   Plan: Hold all antiplatelet and anticoagulant medications. PLC complete. Anticipated discharge today to ARU, MAVIS set up discharge ride for [id number] with [employer]. Continue with current POC.

## 2023-04-14 NOTE — PLAN OF CARE
Status: Admitted 4/8 with acute L basal ganglia ICH  Vitals: VSS on RA. Continuous pulse ox  Neuros: A&O x4 this shift. L side 5, RUE 0, RLE 3. R droop. Slightly slurred speech. R sided numbness.   IV: PIV SL  Labs/Electrolytes: WNL  Resp/trach: WNL  Diet: Regular diet. Family assists with feeding. Swallows pills whole in apple sauce  Bowel status: LBM 4/13.  : Voiding with incontinence.  Skin: Bruising throughout  Pain: Denies  Activity: A2, lift. Not OOB this shift.  Social: Family at bedside and stays overnight. Very supportive and involved with cares.  Plan: Possible discharge to  ARU pending staff.     Stroke Patients:   PLC scheduled or completed: 4/10  Pneumoboots in place: Yes

## 2023-04-14 NOTE — PLAN OF CARE
Physical Therapy Discharge Summary    Reason for therapy discharge:    Discharged to acute rehabilitation facility.    Progress towards therapy goal(s). See goals on Care Plan in [facility] electronic health record for goal details.  Goals not met.  Barriers to achieving goals:   discharge from facility.    Therapy recommendation(s):    Continued therapy is recommended.  Rationale/Recommendations:  ongoing progression of gait, balance, and functional mobility.

## 2023-04-15 ENCOUNTER — APPOINTMENT (OUTPATIENT)
Dept: OCCUPATIONAL THERAPY | Facility: CLINIC | Age: 53
End: 2023-04-15
Attending: PHYSICAL MEDICINE & REHABILITATION
Payer: COMMERCIAL

## 2023-04-15 ENCOUNTER — APPOINTMENT (OUTPATIENT)
Dept: SPEECH THERAPY | Facility: CLINIC | Age: 53
End: 2023-04-15
Attending: PHYSICAL MEDICINE & REHABILITATION
Payer: COMMERCIAL

## 2023-04-15 ENCOUNTER — APPOINTMENT (OUTPATIENT)
Dept: PHYSICAL THERAPY | Facility: CLINIC | Age: 53
End: 2023-04-15
Attending: PHYSICAL MEDICINE & REHABILITATION
Payer: COMMERCIAL

## 2023-04-15 PROCEDURE — 97535 SELF CARE MNGMENT TRAINING: CPT | Mod: GO

## 2023-04-15 PROCEDURE — 250N000013 HC RX MED GY IP 250 OP 250 PS 637: Performed by: PHYSICIAN ASSISTANT

## 2023-04-15 PROCEDURE — 92523 SPEECH SOUND LANG COMPREHEN: CPT | Mod: GN

## 2023-04-15 PROCEDURE — 97167 OT EVAL HIGH COMPLEX 60 MIN: CPT | Mod: GO

## 2023-04-15 PROCEDURE — 128N000003 HC R&B REHAB

## 2023-04-15 PROCEDURE — 97162 PT EVAL MOD COMPLEX 30 MIN: CPT | Mod: GP

## 2023-04-15 RX ADMIN — ACETAMINOPHEN 325MG 650 MG: 325 TABLET ORAL at 03:43

## 2023-04-15 RX ADMIN — ATORVASTATIN CALCIUM 20 MG: 10 TABLET, FILM COATED ORAL at 20:04

## 2023-04-15 ASSESSMENT — ACTIVITIES OF DAILY LIVING (ADL)
ADLS_ACUITY_SCORE: 41
ADLS_ACUITY_SCORE: 35
ADLS_ACUITY_SCORE: 41
BADLS,_PREVIOUS_FUNCTIONAL_LEVEL: INDEPENDENT
ADLS_ACUITY_SCORE: 41
ADLS_ACUITY_SCORE: 31
ADLS_ACUITY_SCORE: 31
ADLS_ACUITY_SCORE: 35
ADLS_ACUITY_SCORE: 41
IADLS,_PREVIOUS_FUNCTIONAL_LEVEL: INDEPENDENT
ADLS_ACUITY_SCORE: 41
ADLS_ACUITY_SCORE: 41
BADLS,_PREVIOUS_FUNCTIONAL_LEVEL: INDEPENDENT
IADLS,_PREVIOUS_FUNCTIONAL_LEVEL: INDEPENDENT
ADLS_ACUITY_SCORE: 41
ADLS_ACUITY_SCORE: 41

## 2023-04-15 NOTE — PLAN OF CARE
Discharge Planner Post-Acute Rehab OT:     Discharge Plan: home w/ home OT w/ family verse OP OT, pt will possibly need 24/7 A    Precautions: Fall, R mir, do not leave on commode/EOB by herself, cognition  Hmong speaking patient, will need .    Current Status:  ADLs:    Mobility: Bed mobility min-mod A. EOB sitting CGA-min A, sera stedy x 1-2. Nsg sera stedy x2.    Grooming: Min A    Dressing: UB TBD, LB dep A.    Bathing: TBD, recommend rolling shower chair.     Toileting: Nsg sera stedy x2.  IADLs: TBD  Vision/Cognition: Vision appears functional, screen as appropriate. Pt is disoriented, impaired memory, impaired attention, impaired speech/communication, aphasia possibly and variably impulsive- responds well to safety cues.    Assessment: 52 year old w/ past medical history of hypertension, [place]' palsy who was admitted on 4/8/23 with spontaneous acute left thalamic intracranial hemorrhage with hospital course complicated by hyperlipidemia, prediabetes, elevated troponin, anemia, hypokalemia, and loose stools.  Presenting w/  impaired activity tolerance, impaired balance, impaired coordination, impaired ROM, impaired sensation, impaired strength w/ R hemiparesis, impaired motor control, fatigue and impaired weight shifting, all affecting her ability to safely and independently perform basic ADLs.  Goal for min A or less with basic ADLs and possibly 24/7 assistance. Per chart review, daughter may be able to provide 24/7 assistance w/ goal of home. Daughter was present during eval and this OT speaking in pt's native language. ELOS: 14+ days.        Other Barriers to Discharge (DME, Family Training, etc):   Review bathroom set up for home.   DME: TBD  Family training: Daughter/family will be present and sleeping over night at times. Continue ongoing family training as appropriate.

## 2023-04-15 NOTE — PHARMACY-ADMISSION MEDICATION HISTORY
Please see Admission Medication History completed on 4/8/23 under previous encounter at [facility] for information regarding prior to admission medications. Patient was changed from benazepril to lisinopril due to formulary changes. Atorvastatin was a new medication started during hospitalization.     Kimberly Peterson, PharmD, BCPS

## 2023-04-15 NOTE — PLAN OF CARE
Goal Outcome Evaluation:       Pt A&Ox3, disoriented to time. Hmong speaking, daughter translated and present at bedside. A2 liko. Pt incont of bladder on shift, PVR of 29. Cont of watery bowel on shift, using bedpan. Pt c/o RLE tenderness to PA, ultrasound completed, results in chart. No skin issues. Pt denies pain, SOB, headache, nausea or new numbness/tingling. Pt's daughter did state pt has been feeling down/depressed after family member's recent suicide, declines intervention at the time. Call light in reach, alarms on, calls appropriately.

## 2023-04-15 NOTE — PHARMACY-MEDICATION REGIMEN REVIEW
Pharmacy Medication Regimen Review  Mercy Osman is a 52 year old female who is currently in the Acute Rehab Unit.    Assessment: All medications have an appropriate indications, durations and no unnecessary use was found    Plan:   Continue current medications/plan.    Attending provider will be sent this note for review.  If there are any emergent issues noted above, pharmacist will contact provider directly by phone.      Pharmacy will periodically review the resident's medication regimen for any PRN medications not administered in > 72 hours and discontinue them. The pharmacist will discuss gradual dose reductions of psychopharmacologic medications with interdisciplinary team on a regular basis.    Please contact pharmacy if the above does not answer specific medication questions/concerns.  Kimberly Peterson, HollyD, BCPS    Background:  A pharmacist has reviewed all medications and pertinent medical history today.  Medications were reviewed for appropriate use and any irregularities found are listed with recommendations.      Current Facility-Administered Medications:      acetaminophen (TYLENOL) tablet 650 mg, 650 mg, Oral, Q4H PRN, Fabi Virgen PA-C     [START ON 4/15/2023] amLODIPine (NORVASC) tablet 10 mg, 10 mg, Oral, Daily, Fabi Virgen PA-C     atorvastatin (LIPITOR) tablet 20 mg, 20 mg, Oral, QPM, Fabi Virgen PA-C, 20 mg at 04/14/23 2049     hydrALAZINE (APRESOLINE) tablet 10 mg, 10 mg, Oral, 4x Daily PRN, Fabi Virgen PA-C     [START ON 4/15/2023] lisinopril (ZESTRIL) tablet 20 mg, 20 mg, Oral, Daily, Fabi Virgen PA-C     melatonin tablet 1 mg, 1 mg, Oral, At Bedtime PRN, Fabi Virgen PA-C     [START ON 4/15/2023] polyethylene glycol (MIRALAX) Packet 17 g, 17 g, Oral, Daily, Fabi Virgen PA-C     senna-docusate (SENOKOT-S/PERICOLACE) 8.6-50 MG per tablet 2 tablet, 2 tablet, Oral, At Bedtime, Fabi Virgen PA-C, 2 tablet at 04/14/23 2049  No  current outpatient prescriptions on file.

## 2023-04-15 NOTE — PROGRESS NOTES
CLINICAL NUTRITION SERVICES - ASSESSMENT NOTE     Nutrition Prescription    RECOMMENDATIONS FOR MDs/PROVIDERS TO ORDER:  None at this time    Malnutrition Status:    Unable to determine    Recommendations already ordered by Registered Dietitian (RD):  Please send vanilla ensure at 10 am and 2 pm snack time    Future/Additional Recommendations:  Monitor patient weight, intakes, labs/meds, and GI/BM  Monitor patient tolerance to supplement  Follow up and complete NFPA as able     REASON FOR ASSESSMENT  Mercy Osman is a/an 52 year old female assessed by the dietitian for Provider Order - assess/optimize nutrition and RN Consult - Poor appetite, reported weight loss    NUTRITION HISTORY  Mercy Osman is a 52 year old with past medical history of hypertension and Bell's palsy. Patient presents with Left basal ganglia intraparenchymal hemorrhage, spontaneous, likely secondary to uncontrolled hypertension Vasogenic cerebral edema Deficits: right hemiparesis, right facial droop, right hemisensory deficit, right visual neglect, dysarthria, ?aphasia, dysphagia, suspect impaired cognition, HTN, HLD, elevated trop, hypokalemia, hypercholoremia, prediabetes and anemia    RD spoke to patient and son over phone d.t covering remotely. Patient son reported that patients appetite has been decreased ever since her stroke about 1 week ago but has been improving. Patient son hasn't noticed any weight loss but was unsure. No concerns regarding GI disruptions such as diarrhea/constipation noted. Patient has not tried supplements in the past but was agreeable to trying supplements with a preference for vanilla. Patient noted preferences for applesauce, chicken soup, and cream of wheat. No further nutrition questions/concerns at this time. RD went over the importance of calorie and protein intakes for the preservation of LBM.     CURRENT NUTRITION ORDERS  Diet: Orders Placed This Encounter      Combination Diet Easy to Chew (level 7); Thin  "Liquids (level 0)      Intake/Tolerance: One documented intake of 100%    LABS  Labs reviewed  Phos elevated-5.1 mg/dL    MEDICATIONS  Medications reviewed  Scheduled: Lipitor, Senna  Continuous: None  PRN: none pertinent    ANTHROPOMETRICS  Height: 147.3 cm (4' 10\")  Most Recent Weight: 50.5 kg (111 lb 5.3 oz)    IBW: 40.9 kg  BMI: Normal BMI  Weight History:   Wt Readings from Last 15 Encounters:   04/14/23 50.5 kg (111 lb 5.3 oz)   04/09/23 52.6 kg (115 lb 15.4 oz)   04/08/23 47.6 kg (105 lb)   3.5% significant weight loss within 1 week.    Dosing Weight: 50.5 kg-actual BW used    ASSESSED NUTRITION NEEDS  Estimated Energy Needs: 1,262-1,515 kcals/day (25 - 30 kcals/kg)  Justification: Maintenance  Estimated Protein Needs: 51-61 grams protein/day (1 - 1.2 grams of pro/kg)  Justification: Increased needs  Estimated Fluid Needs: 1,262-1,515 mL/day (1 mL/kcal)   Justification: Per provider pending fluid status    PHYSICAL FINDINGS  See malnutrition section below.    MALNUTRITION  % Intake: Decreased intakes does not meet critreria  % Weight Loss: > 2% in 1 week (severe)  Subcutaneous Fat Loss: Unable to assess  Muscle Loss: Unable to assess  Fluid Accumulation/Edema: None noted  Malnutrition Diagnosis: Unable to determine    NUTRITION DIAGNOSIS  Unintended weightloss related to inadequate oral intake and dysphagia as evidenced by weight loss of 3.5% within one week.       INTERVENTIONS  Implementation  Collaboration with other providers-IDT rounds  Medical food supplement therapy-Please send ensure enlive vanilla at 10 am and 2 pm snack time  Nutrition education provided on the importance of calorie and protein intakes for the preservation of LBM     Goals  Patient to consume % of nutritionally adequate meal trays TID, or the equivalent with supplements/snacks.    Patient will maintain weight throughout admission     Monitoring/Evaluation  Progress toward goals will be monitored and evaluated per " protocol.    Jaelyn Grayson RDN, EMILIA  Clinical Dietitian  Office: [phone]  Weekend pager: [phone]

## 2023-04-15 NOTE — PLAN OF CARE
Discharge Planner Post-Acute Rehab SLP:     Discharge Plan: Home with 24/7 supervision from family, ongoing speech therapy     Precautions: Disoriented,Mod-severe cognitive impairment, Hmong speaking (family interprets), falls risk, R hemiparesis    Current Status:  Hearing: Mild hearing loss per family report  Vision: WFL  Communication: Patient reports word-finding difficulties but overall able to make wants and needs known  Cognition: Moderate-Severe Cognitive impairment  Swallow: Easy to Chew (7) and Thin Liquids (0). Patient and family indicate that patient prefers softer foods. Will monitor if patient presents with any future difficulties.     Assessment: Informal cognitive assessment completed. Patient is Hmong speaking. Patient's son present and intrepreted during session today. Patient is presenting with moderate-severe cognitive impairments in areas of orientation, memory, attention, and executive functioning. Patient disoriented to place, time, and situation even with guidance and cues. D/t patient's deficits and language barrier, some subtests were not completed. Significant impairments in memory observed throughout task. Pt performed delayed recall task with 0% accuracy. Further assessment recommended to gain more insight into patient's level of deficits during functional tasks. Skilled intervention is recommended to treat areas of attention, memory, executive functioning and orientation.    Other Barriers to Discharge (Family Training, etc): family training

## 2023-04-15 NOTE — PROGRESS NOTES
Discharge Planner Post-Acute Rehab PT:     Discharge Plan: home with 24/7 family assistance, home PT    Precautions: falls, alarms, R hemiparesis,  Do not leave on commode/EOB alone, cognitive impairment,  Hmong  needed    Current Status:  Bed Mobility: mod A x1 to roll R, max A x1 to roll L  Transfer: Max A x1 sit<>stand and stand pivot in either direction   Gait: 5' sidestepping at EOB with Max A x2 with L NBQC  Stairs: not assessed  Balance: not assessed    Assessment:  52 y.o. R handed female presents s/p ICH.  Prior to admission, pt was independent in all areas of mobility and ADLs without the use of an assistive device or adaptive equipment.  Currently, pt requires mod/max A x1-2 for mobility, heavy reliance on a quad cane for standing balance, and frequent rest breaks due to fatigue. Pt has significant R hemiparesis requiring R AFO and R UE support for mobility and positioning.  Pt would benefit from at least 2 weeks of acute inpatient physical therapy to address impairments and functional limitations associated with her stroke and provide family training for a safe discharge plan.    Other Barriers to Discharge (DME, Family Training, etc):   Will need AFO, wheelchair and quad cane  Family Training

## 2023-04-15 NOTE — PROGRESS NOTES
04/15/23 1015   Appointment Info   Signing Clinician's Name / Credentials (PT) Murali Bennett, PT       Present yes   Language Hmong   Living Environment   People in Home child(riaz), adult;spouse   Current Living Arrangements house   Home Accessibility stairs to enter home;stairs within home   Number of Stairs, Main Entrance 5   Stair Railings, Main Entrance none   Number of Stairs, Within Home, Primary greater than 10 stairs  (1 flight to basement, does not have to go downstairs for laundry)   Transportation Anticipated family or friend will provide   Living Environment Comments Pt lives with  and adult children. All needs on main level.  Bathroom is a walk-in shower.  Laundry on lower level.   Self-Care   Usual Activity Tolerance good   Current Activity Tolerance fair   Equipment Currently Used at Home none   Fall history within last six months yes   Number of times patient has fallen within last six months 1   Activity/Exercise/Self-Care Comment Prior to admission, pt was independent with mobility and ADLs without an assistive device or adaptive equipment.   Post-Acute Assessment Only   Post-Acute Functional Assessment See below   Previous Level of Function/Home Environm   Bathing/Grooming, Premorbid Functional Level independent   Dressing, Premorbid Functional Level independent   Eating/Feeding, Premorbid Functional Level independent   Toileting, Premorbid Functional Level independent   BADLs, Premorbid Functional Level independent   IADLs, Premorbid Functional Level independent   Bed Mobility, Premorbid Functional Level independent   Transfers, Premorbid Functional Level independent   Household Ambulation, Premorbid Functional Level independent   Stairs, Premorbid Functional Level independent  (uses railing as needed)   Community Ambulation, Premorbid Functional Level independent   General Information   Onset of Illness/Injury or Date of Surgery 04/08/23   Referring Physician  "Erasmo Diaz MD   Patient/Family Therapy Goals Statement (PT) \"to go home\"   Pertinent History of Current Problem (include personal factors and/or comorbidities that impact the POC) Per H&P written by CORINNE Louise on 4/14/23, \"x2 hand rails, step-to pattern.\"   Existing Precautions/Restrictions fall   Weight-Bearing Status - LLE weight-bearing as tolerated   Weight-Bearing Status - RLE weight-bearing as tolerated   Heart Disease Risk Factors High blood pressure   General Observations 52 y.o. female supine in bed, L sided gaze preference.  Son present in room and acting as ong    Cognition   Cognitive Status Comments Defer to OT/SLP   Pain Assessment   Patient Currently in Pain Yes, see Vital Sign flowsheet   Integumentary/Edema   Integumentary/Edema no deficits were identifed   Posture    Posture Forward head position   Posture Comments slouched posture, leans R due to hemiparesis   Range of Motion (ROM)   ROM Comment L LE ROM WFL.  R LE limited due to R hemiparesis.   Strength (Manual Muscle Testing)   Strength (Manual Muscle Testing) MMT: Hip;MMT: Knee;MMT: Ankle   Left Hip (Manual Muscle Testing)   Hip Flexion - Left Side (3+/5) fair plus, left   Right Hip (Manual Muscle Testing)   Hip Flexion - Right Side (2/5) poor, right   Left Knee (Manual Muscle Testing)   Knee Flexion - Left Side (4/5) good, left   Knee Extension - Left Side (4/5) good, left   Right Knee (Manual Muscle Testing)   Knee Flexion - Right Side (2/5) poor, right   Knee Extension - Right Side (2/5) poor, right   Left Ankle/Foot (Manual Muscle Testing)   Ankle Dorsiflexion - Left Side (4/5) good, left   Ankle Plantarflexion - Left Side (4/5) good, left   Right Ankle/Foot (Manual Muscle Testing)   Ankle Dorsiflexion - Right Side (2/5) poor, right   Ankle Plantarflexion - Right Side (2/5) poor, right   Balance   Balance Comments Not formally assessed due to time limitations. Leans R due to hemiparesis   Sensory " Examination   Sensory Perception Comments Pt c/o decreased light tough to R LE.  0/5 accuracy to R toe proprioception   Coordination   Coordination other (see comments)   Coordination Comments R LE incoordination   Muscle Tone   Muscle Tone no deficits were identified   Clinical Impression   Criteria for Skilled Therapeutic Intervention Yes, treatment indicated   PT Diagnosis (PT) impaired mobility due to ICH   Influenced by the following impairments decreased strength, decreased ROM, impaired sensation, impaired coordination, impaired balance, fatigue, pain, safety deficits,   Functional limitations due to impairments bed mobility, transfers, ambulation, stairs, balance, activity tolerance.   Clinical Presentation (PT Evaluation Complexity) Evolving/Changing   Clinical Presentation Rationale falls with lack of insight into deficits, R hemiparesis causing deficits in all areas of mobility and ADLs,   Clinical Decision Making (Complexity) moderate complexity   Planned Therapy Interventions (PT) balance training;bed mobility training;groups;gait training;home exercise program;manual therapy techniques;motor coordination training;neuromuscular re-education;patient/family education;orthotic fitting/training;postural re-education;ROM (range of motion);stair training;strengthening;stretching;swiss ball techniques;TENS;thermotherapy;transfer training;wheelchair management/propulsion training;home program guidelines;risk factor education;progressive activity/exercise;E-stim   Anticipated Equipment Needs at Discharge (PT) shower chair;walker, rolling   Risk & Benefits of therapy have been explained evaluation/treatment results reviewed;care plan/treatment goals reviewed;risks/benefits reviewed;current/potential barriers reviewed;participants voiced agreement with care plan;participants included;patient;son   Clinical Impression Comments 52 y.o. R handed female presents s/p ICH.  Prior to admission, pt was independent in all  areas of mobility and ADLs without the use of an assistive device or adaptive equipment.  Currently, pt requires mod/max A x1-2 for mobility, heavy reliance on a quad cane for standing balance, and frequent rest breaks due to fatigue. Pt has significant R hemiparesis requiring R AFO and R UE support for mobility and positioning.  Pt would benefit from at least 2 weeks of acute inpatient physical therapy to address impairments and functional limitations associated with her stroke and provide family training for a safe discharge plan.   PT Total Evaluation Time   PT Eval, Moderate Complexity Minutes (54509) 25   Physical Therapy Goals   PT Frequency Daily   PT Predicted Duration/Target Date for Goal Attainment 04/28/23   PT Goals Bed Mobility;Transfers;Gait;Stairs;Wheelchair Mobility   PT: Bed Mobility Supervision/stand-by assist   PT: Transfers Supervision/stand-by assist  (with least restrictive assistive device)   PT: Gait Minimal assist  (25 feet with least restrictive assistive device)   PT: Stairs Minimal assist  (4-5 with family support to be able to get into the house)   PT: Wheelchair Mobility 150 feet  (with supervision in manual wheelchair)   Therapeutic Activity   Therapeutic Activities: dynamic activities to improve functional performance Minutes (41489) 28   Treatment Detail/Skilled Intervention PT:  completes sit<>stand at bedside with  mod A.  Constant verbal and tactile cues for proper hand and foot positioning with minimal carryover even with repetition.  Max A x2 with R NBQC and R AFO/loaner shoes to complete 5' side steps in both directions (repeated x3) at EOB.  Pt with knee buckling, poor foot placement and difficulty with motor planning and initiation.   PT Discharge Planning   PT Plan PT:  Continue with GG, balance, PASS   Post Acute Settings Only   What unit is patient on? Acute Rehab   Roll Left and Right   Assistance Needed Verbal cues;Adaptive equipment   Physical Assistance Level 50%-74%    Comment Max A to roll to L, Mod A to roll to R, bed flat, use of bed rails as able.   Roll Left to Right CARE Score 2   Sit to Lying   Assistance Needed Verbal cues;Adaptive equipment   Physical Assistance Level 50%-74%   Comment HOB flat, bed rails   Sit to Lying CARE Score 2   Lying to Sitting on Side of Bed   Assistance Needed Verbal cues;Adaptive equipment   Physical Assistance Level 50%-74%   Comment HOB flat, bed ralis   Lying to Sitting CARE Score 2   Sit to Stand   Assistance Needed Verbal cues;Adaptive equipment   Physical Assistance Level Total assistance   Comment total assist x2, quad cane   Sitting to Standing CARE Score 1   Walk 10 Feet   Reason if not Attempted Safety concerns   Walk 10 Ft. CARE Score 88   Walk 50 Feet with Two Turns   Reason if not Attempted Safety concerns   Walk 50 Ft. CARE Score 88   Walk 150 Feet   Reason if not Attempted Safety concerns   Walk 150 Ft. CARE Score 88   Walking 10 Feet on Uneven Surfaces   Reason if not Attempted Safety concerns   Walking 10 Feet on Uneven Surfaces CARE Score 88   Wheel 50 Feet with Two Turns   Reason if not Attempted Environmental limitations (e.g., lack of equipment,weather constraints)  (time limitations.)   Wheel 50 Feet with Two Turns CARE Score 10   Wheel 150 Feet   Reason if not Attempted Environmental limitations (e.g., lack of equipment,weather constraints)  (time limitations)   Wheel 150 Feet CARE Score 10   1 Step (Curb)   Reason if not Attempted Safety concerns   1 Step CARE Score 88   4 Steps   Reason if not Attempted Safety concerns   4 Steps CARE Score 88   12 Steps   Reason if not Attempted Safety concerns   12 Steps CARE Score 88   Picking Up Object   Reason if not Attempted Safety concerns    CARE Score 88   Car Transfer   Reason if not Attempted Safety concerns   Car Transfer CARE Score 88

## 2023-04-15 NOTE — PLAN OF CARE
"Goal Outcome Evaluation:    Patient A&O x3, disoriented to time. Hmong speaking and has daughter in room spending the night to interpret. One incontinent episode resulting in brief change during shift, LBM 4/14. No acute issues overnight. C/o pain per daughter and PRN tylenol given. Patient appeared to be sleeping throughout the rest of this shift.            Patient's most recent vital signs are:  /73 (BP Location: Left arm, Patient Position: Supine)   Pulse 93   Temp 97.9  F (36.6  C) (Oral)   Resp 18   Ht 1.473 m (4' 10\")   Wt 50.5 kg (111 lb 5.3 oz)   SpO2 95%   BMI 23.27 kg/m         "

## 2023-04-15 NOTE — PROGRESS NOTES
"   04/15/23 1401   Appointment Info   Signing Clinician's Name / Credentials (SLP) Naida Hussein, SLP Student   Student Supervision Direct supervision provided   General Information   Onset of Illness/Injury or Date of Surgery 04/08/23   Referring Physician Fabi Virgen PA-C   Pertinent History of Current Problem Per H&P: \"Mercy Osman is a 52 year old right hand dominant female with past medical history of hypertension and Bell's palsy who presented to [facility] on 4/8/23 after fall secondary to acute onset of right hemiparesis, additionally with right facial droop.  Labs remarkable for hypokalemia (2.6), hyperglycemia, mild anemia. BP elevated in ED to 227/115.  CT head revealed acute hemorrhage in left thalamus/left internal capsule with involvement of left basal ganglia.  Neurology was consulted and patient was transferred to [facility] for further evaluation and management.BP initially managed with nicardipine drip (off since 4/8), transitioned to oral lisinopril and amlodipine. Additional stroke work-up with EKG with sinus rhythm/arrhythmia, LVH, ST & T-wave abnormality; echo with LVEF 50-55% with left ventricular hypertrophy, dilated ascending aorta; A1c 6.4% (pre-diabetes); and .  Started on statin therapy. She was noted to have some worsening stroke deficits on 4/9/23, repeat CT head demonstrating stability of hemorrhage with mild increase in bree-hematoma edema, likely responsible for change in presentation per neurology\" SLP consulted for assessment of cognition and dysphagia.   General Observations Patient agreeable to evaluation but disoriented. Followed by acute care SLP for dysphagia related goals. Cognitive assessment deferred to ARU.       Present yes   Language Hmong, patient's son interpreted  (Has been previously requested by family)   Type of Evaluation   Type of Evaluation Speech, Language, Cognition   Motor Speech   Comment, Motor Speech Assessment No " reported difficulties with motor speech.   Auditory Comprehension   Comment, Assessment (Auditory Comprehension) Impaired. Pt has significant difficulty following commands and understanding directions.   Verbal Expression   Comment, Assesment (Verbal Expression) Patient reports word-finding difficulties.   Reading Comprehension   Comment, Assessment (Reading Comprehension) Not assessed, patient unable to read/write in English language. Will assess in future session to determine if pt can read/write in Hmong   Written Language   Comment, Assessment (Written Language) Limited opportunity to assess. Manual dexterity.   Cognition   Cognitive Function attention deficit;executive function deficit;memory deficit   Cognitive Status Patient agreeable to evaluation but notably disoriented   Cognitive Status Exam Comments Informal cognitive assessment completed. See plan of care note for detail.   Orientation Status (Cognition) disoriented to;place;situation;time   Affect/Mental Status (Cognition) confused   Follows Commands (Cognition) delayed response/completion;increased processing time needed;initiation impaired;repetition of directions required;verbal cues/prompting required   Executive Function Deficit (Cognition) severe deficit   Attention Deficit (Cognition) moderate deficit   Memory Deficit (Cognition) severe deficit   Clinical Impression   Criteria for Skilled Therapeutic Interventions Met (SLP Eval) Yes, treatment indicated   SLP Diagnosis Moderate-severe cognitive impairment   Problem List (SLP) Cognition, Falls risks Bell's palsy, R hemiparesis.   Functional Limitations Related to Problem List (SLP) Will require 24/7 supervision with all IADL's d/t current cogntivie status.   Risks & Benefits of therapy have been explained care plan/treatment goals reviewed;risks/benefits reviewed;participants voiced agreement with care plan;participants included;patient;son   Clinical Impression Comments Informal cognitive  assessment completed. Patient is Hmong speaking. Patient's son present and intrepreted during session today. Patient is presenting with moderate-severe cognitive impairments in areas of orientation, memory, attention, and executive functioning. Patient disoriented to place, time, and situation even with guidance and cues. D/t patient's deficits and language barrier, some subtests were not completed. Significant impairments in memory observed throughout task. Pt performed delayed recall task with 0% accuracy. Further assessment recommended to gain more insight into patient's level of deficits during functional tasks. Skilled intervention is recommended to treat areas of attention, memory, executive functioning and orientation.   SLP Total Evaluation Time   Eval: Sound production with lang comprehension and expression Minutes (48859) 45   SLP Goals   SLP Goals SLP Goal 1;SLP Goal 2   SLP: Goal 1 Patient will utilize compensatory memory strategies to recall novel information and answer orientation questions with 80% accuracy given minimal cues.   SLP: Goal 2 Patient will engage in basic-moderate level attention and problem solving tasks with a level of 80% accuracy given minimal verbal cues.   SLP Discharge Planning   SLP Plan SLP:  Basic attention tasks. Introduce orientation visuals in room. Memory strategies. Assess for R sided visual neglect? Inquire about reading/writing in Hmong?   Total Session Time   Total Session Time (sum of timed and untimed services) 45   SLP - Acute Rehab Center Time   Individual Time (minutes) - enter zero if not applicable - SLP 45   Group Time (minutes) - enter zero if not applicable  - SLP 0   Concurrent Time (minutes) - enter zero if not applicable  - SLP 0   Co-Treatment Time (minutes) - enter zero if not applicable  - SLP 0   ARC Total Session Time (minutes) - SLP 45   Comprehension   Comprehension Comment SLP: Significantly impaired, requires multiple cues and repetition to  comprehend commands/instructions   Expression   Expression Comment SLP: Patient reports some word-finding difficulties but appears to be able to make wants/needs known.   Problem Solving   Problem Solving Comment SLP: Moderate-severe cognitive impairment   Memory   Memory Comment SLP: Severe impairments.

## 2023-04-15 NOTE — PROGRESS NOTES
04/15/23 0644   Appointment Info   Signing Clinician's Name / Credentials (OT) Kayli Her, OT   Living Environment   People in Home child(riaz), adult;spouse   Current Living Arrangements house   Home Accessibility stairs to enter home;stairs within home   Number of Stairs, Main Entrance 1   Number of Stairs, Within Home, Primary greater than 10 stairs  (basement level)   Transportation Anticipated family or friend will provide   Living Environment Comments Pt lives w/ her spouse and some adult children in the home. Need to review what bathroom set is w/ family.   Self-Care   Usual Activity Tolerance good   Current Activity Tolerance fair   Equipment Currently Used at Home none   Fall history within last six months yes   Number of times patient has fallen within last six months 1   Activity/Exercise/Self-Care Comment Pt and daughter confims pt was IND w/ ADLs and no AE/DME needs w/ transfers/ADLs/ambulation.   Instrumental Activities of Daily Living (IADL)   IADL Comments Pt was not working and did not drive, otherwise pt was IND w/ meds/finances per daughter. Cooking, housekeeping and laundry.   Post-Acute Assessment Only   Post-Acute Functional Assessment See below   Previous Level of Function/Home Environm   Bathing/Grooming, Premorbid Functional Level independent   Dressing, Premorbid Functional Level independent   Eating/Feeding, Premorbid Functional Level independent   Toileting, Premorbid Functional Level independent   BADLs, Premorbid Functional Level independent   IADLs, Premorbid Functional Level independent   Bed Mobility, Premorbid Functional Level independent   Transfers, Premorbid Functional Level independent   Cognitive Status Examination   Orientation Status person   Follows Commands follows two-step commands;50-74% accuracy   Safety Deficit minimal deficit   Memory Deficit moderate deficit   Attention Deficit minimal deficit   Executive Function Deficit severe deficit;abstract thinking   Cognitive  Status Comments Pt oriented to self but disoriented to date, month, year,  and age. She reports she is 33 years old.   Visual Perception   Visual Impairment/Limitations WFL   Impact of Vision Impairment on Function (Vision) Pt able to attend to L/R but mild visual inattention to R side w/ tasks.   Sensory   Sensory Quick Adds right UE;right LE   Sensory Comments Absent sensation for diminised or light touch. Impaired proprioception.   Pain Assessment   Patient Currently in Pain No   Range of Motion Comprehensive   Comment, General Range of Motion WNL for LUE and PROM for RUE w/ WFL, cuation w/ R shoulder.   Strength Comprehensive (MMT)   Comment, General Manual Muscle Testing (MMT) Assessment Grossly functional on LUE, no MMT. Some trace noted in sh, elbow. No active noted wrist/hands/fingers.   Muscle Tone Assessment   Muscle Tone Comments Some mild tone in hands w/ flexion in digits/elbow.   Coordination   Upper Extremity Coordination Right UE impaired   Gross Motor Coordination Right UE impaired;   Fine Motor Coordination Right UE impaired;   Transfers   Transfer Comments Dep A x2 w/ sera stedy.   Clinical Impression   Criteria for Skilled Therapeutic Interventions Met (OT) Yes, treatment indicated   OT Diagnosis ADL/IADL impairments   OT Problem List-Impairments impacting ADL problems related to   Assessment of Occupational Performance 5 or more Performance Deficits   Identified Performance Deficits Dressing, g/h tasks, showering, toileting and functional transfers   Planned Therapy Interventions (OT) ADL retraining;IADL retraining   Clinical Decision Making Complexity (OT) high complexity   Risk & Benefits of therapy have been explained evaluation/treatment results reviewed;risks/benefits reviewed;care plan/treatment goals reviewed;current/potential barriers reviewed;participants voiced agreement with care plan;patient;participants included   OT Total Evaluation Time   OT Eval, Low Complexity Minutes ([id number])  20   OT Goals   Therapy Frequency (OT) Daily   OT Predicted Duration/Target Date for Goal Attainment 04/28/23   OT Goals Upper Body Dressing;Hygiene/Grooming;Lower Body Dressing;Lower Body Bathing;Bed Mobility;Upper Body Bathing;Transfers;Toilet Transfer/Toileting;Meal Preparation   OT: Hygiene/Grooming modified independent   OT: Upper Body Dressing Minimal assist   OT: Lower Body Dressing Minimal assist   OT: Upper Body Bathing Supervision/stand-by assist   OT: Lower Body Bathing Minimal assist   OT: Bed Mobility Supervision/stand-by assist   OT: Transfer Minimal assist   OT: Toilet Transfer/Toileting Minimal assist   Self-Care/Home Management   Self-Care/Home Mgmt/ADL, Compensatory, Meal Prep Minutes ([id number]) 69   Treatment Detail/Skilled Intervention OT: Daughter, Kayli present. This writer speaking in pt's native language. Pt was mildly impulsive and needs cues. Once re-directed pt able to follow through. Pt was incontinent or had gone in brief and needed full brief/bedding change/cares supine. Pt completed supine to sit w/ mod A w/ HOB up. Sat EOB w/ min A and progressing to CGA w/ cues for balance and holding onto bed rail. See GG codes for ADL details. Pt completed STS x 2 x 3 reps from EOB w/ repeated cues for hand placement and tech. Issued commode w/ using sera stedy to commode w/ pt able to complete STS from commode x 3 w/ cues for L hand placement and safety w/ repositioning. Pt sat at EOB to complete g/h tasks before returning to supine w/ dep Ax1-2.   OT Discharge Planning   OT Plan OT: Sera stedy to rolling shower chair-purple or grey. Complete GG ADLs. Use Sera stedy for LB dressing/supine. Pt has clothes in room. Daughter usually present.   Total Session Time   Timed Code Treatment Minutes 55   Total Session Time (sum of timed and untimed services) 75   Post Acute Settings Only   What unit is patient on? Acute Rehab   OT - Acute Rehab Center Time   Individual Time (minutes) - enter zero if not  applicable - OT 75   Group Time (minutes) - enter zero if not applicable  - OT 0   Concurrent Time (minutes) - enter zero if not applicable  - OT 0   Co-Treatment Time (minutes) - enter zero if not applicable  - OT 0   ARC Total Session Time (minutes) - OT 75   Comprehension   Functional Performance Requires repetition (see comments);Requires extra time;Minimal prompting (comprehends basic ideas 75-90% of the time)   Expression   Functional Performance Maximal prompting-expresses basic ideas 25-49% of the time   Social Interaction   Functional Performance Moderate direction-patient interacts appropriately 50-74% of the time   Problem Solving   Functional Performance Maximal direction-needs help to solve routine problems 51-75% of the time   Memory   Functional Performance Total assistance-recognizes and remembers less than 25% of the time   Oral Hygiene   Describe performance OT: CGA sitting   Grooming (except oral cares)   Grooming Task Performed Washing hands;Washing face;Hair grooming   Grooming Comment OT: Min A and cues sitting EOB   Lower Body Dressing putting on/taking off footwear   Complete independence with Lower Body Dressing Footwear no   Physical assistance to don/doff socks/shoes and other foorwear [place] does ALL of the effort to don socks/shoes and other footwear   Tub / Shower Transfer   Tub/Shower Transfer Comment OT: Not yet safe.   Toilet Hygiene   Complete independence Toileting Task no   Physical assistance to complete Toileting Task Complete dependence for toileting (patient does none of effort) or assist of 2 helpers   Toilet Transfer   Complete independence with getting on and off a toilet or commode no   Describe performance OT: Sera stedy   Physical assistance for getting on and off a toilet or commode Completely dependent for getting on and off the toilet/commode, pt does none of the effort or 2 helpers.   Chair/bed-to-chair Transfer   Complete independence for chair-bed-to-chair transfer  no   Describe performance OT: Sera stedy   Roll Left and Right   Physical Assistance Level 75% or more   Roll Left to Right CARE Score 2   Sit to Lying   Physical Assistance Level 75% or more   Sit to Lying CARE Score 2   Lying to Sitting on Side of Bed   Physical Assistance Level Total assistance   Lying to Sitting CARE Score 1   Sit to Stand   Physical Assistance Level Total assistance   Sitting to Standing CARE Score 1

## 2023-04-16 ENCOUNTER — APPOINTMENT (OUTPATIENT)
Dept: SPEECH THERAPY | Facility: CLINIC | Age: 53
End: 2023-04-16
Attending: PHYSICAL MEDICINE & REHABILITATION
Payer: COMMERCIAL

## 2023-04-16 ENCOUNTER — APPOINTMENT (OUTPATIENT)
Dept: PHYSICAL THERAPY | Facility: CLINIC | Age: 53
End: 2023-04-16
Attending: PHYSICAL MEDICINE & REHABILITATION
Payer: COMMERCIAL

## 2023-04-16 ENCOUNTER — APPOINTMENT (OUTPATIENT)
Dept: OCCUPATIONAL THERAPY | Facility: CLINIC | Age: 53
End: 2023-04-16
Attending: PHYSICAL MEDICINE & REHABILITATION
Payer: COMMERCIAL

## 2023-04-16 PROCEDURE — 97129 THER IVNTJ 1ST 15 MIN: CPT | Mod: GN

## 2023-04-16 PROCEDURE — 99232 SBSQ HOSP IP/OBS MODERATE 35: CPT | Performed by: STUDENT IN AN ORGANIZED HEALTH CARE EDUCATION/TRAINING PROGRAM

## 2023-04-16 PROCEDURE — 97535 SELF CARE MNGMENT TRAINING: CPT | Mod: GO

## 2023-04-16 PROCEDURE — 97130 THER IVNTJ EA ADDL 15 MIN: CPT | Mod: GN

## 2023-04-16 PROCEDURE — 128N000003 HC R&B REHAB

## 2023-04-16 PROCEDURE — 250N000013 HC RX MED GY IP 250 OP 250 PS 637: Performed by: PHYSICIAN ASSISTANT

## 2023-04-16 PROCEDURE — 97530 THERAPEUTIC ACTIVITIES: CPT | Mod: GP

## 2023-04-16 RX ADMIN — SENNOSIDES AND DOCUSATE SODIUM 2 TABLET: 50; 8.6 TABLET ORAL at 20:18

## 2023-04-16 RX ADMIN — ATORVASTATIN CALCIUM 20 MG: 10 TABLET, FILM COATED ORAL at 20:18

## 2023-04-16 RX ADMIN — LISINOPRIL 20 MG: 20 TABLET ORAL at 08:14

## 2023-04-16 ASSESSMENT — ACTIVITIES OF DAILY LIVING (ADL)
ADLS_ACUITY_SCORE: 41

## 2023-04-16 NOTE — CONSULTS
Social Work: Initial Assessment with Discharge Plan    Patient Name: Meryc Osman  : 1970  Age: 52 year old  MRN: [id number]  Completed assessment with: Chart review and interview with patient and family   Admitted to ARU: 2023    Presenting Information   Date of SW assessment: 2023  Health Care Directive: Health Care Directive Agent (if patient not able to make decisions). Provide copy of HCD to Pt and family.  Primary Health Care Agent: Patient/self   Secondary Health Care Agent: DOMINIC  Living Situation: Pt. lives in house with  (Rush Roger) and adult children (1 daughter, 2 son).  1 stair to enter, 12 stairs within to basement but all needs can be met on main level.   Previous Functional Status: Pt was independent with all ADLs/IADLs, transfers, mobility and gait. Pt was dirving.   DME available: See therapy evaluation for more information   Patient and family understanding of hospitalization: Appropriate and pleasant.   Cultural/Language/Spiritual Considerations: 52 yr old women, , , hmong speaking      Physical Health  Reason for admission: Mercy Osman is a 52 year old right hand dominant female with past medical history of hypertension and Bell's palsy who [facility] on [facility] after fall secondary to acute onset of right hemiparesis, additionally with right facial droop.  Labs remarkable for hypokalemia (2.6), hyperglycemia, mild anemia. BP elevated in ED to 227/115.  CT head revealed acute hemorrhage in left thalamus/left internal capsule with involvement of left basal ganglia.  Neurology was consulted and patient was transferred to [facility] for further evaluation and management.    Provider Information   Primary Care Physician:Ara Osman   ARDM[facility]RY [facility] [place] will schedule PCP apt at discharge.   : none reported     Mental Health/Chemical Dependency:   Diagnosis: Pt. Denies history of mental health. Pt scored 18 on PHQ-9 and reported having thoughts of  harming herself. Kayli (Daughter) shared that Pt's son  by suicide and that it's been really hard on Pt.    Alcohol/Tobacco/Narcotis: Pt. Denies alcohol\tobacco/drug use.   Support/Services in Place: Pt reported none.   Services Needed/Recommended: [place] and Health Psychology support while on ARU available.   Sexuality/Intimacy: Not discussed     Support System  Marital Status:  to , Rush Roger  Family support: Pt has adult children (1 daughter - Kayli Roger, 2 son - Mamta Roger and Joel Roger ). Daughter is able to provide 24/7 support on discharge. She is interested in becoming the patient's PCA.   Other support available: none reported.     Community Resources  Current in home services: Pt reported no services.   Previous services: Pt reported no services.     Financial/Employment/Education  Employment Status: not working  Income Source: Kids pays for everything.   Education:  reported Pt. Never went to school.   Financial Concerns:  Pt. Reported no concens  Insurance: [facility]     Discharge Plan   Patient and family discharge goal: TBD, pending progress  Provided Education on discharge plan: Evaluations and discharge recommendations pending.   Patient agreeable to discharge plan:  Pending further discussion. Evaluations and discharge recommendations pending.   Provided education and attained signature for Medicare IM and IRF Patient Rights and Privacy Information provided to patient : NA  Provided patient with [place] [facility] Resources: Yes, provided booklet to Pt and family.   Barriers to discharge: TBD    Discharge Recommendations   Disposition: See above   Transportation Needs:family     Additional comments   Discharge TBD, ELOS 2 weeks    SW will remain available and continue to follow as needs arise.       -------------------------------------------------------------------------------------------------------------  ALONDRA Pain Assessment    Pain Effect on Sleep  Over  "the past 5 days, how much of the time has pain made it hard for you to sleep at night?\"    4. Almost constantly  0 - Does not apply - I have not had any pain or hurting in the past 5 days  1 - Rarely or nor at all  2 - Occasionally  3 - Frequently   8 - Unable to Answer    Pain Interference with Therapy Activities  \"Over the past 5 days, how often have you limited your participation in rehabilitation therapy sessions due to pain?\"  4. Almost constantly  0 - Does not apply - I have not received rehabilitation therapy in the past 5 days  1 - Rarely or nor at all  2 - Occasionally  3 - Frequently   8 - Unable to Answer    Pain Interference with Day-to-Day Activities  \"Over the past 5 days, how often have you limited your day-to-day activities (excluding rehabilitation therapy sessions) because of pain?\"  4. Almost constantly  1 - Rarely or nor at all  2 - Occasionally  3 - Frequently   8 - Unable to Answer  -------------------------------------------------------------------------------------------------------------      RUSSELL Schaeffer   [facility]    Social Work  [address] [place] [id number]  [phone] or [phone])       "

## 2023-04-16 NOTE — PROGRESS NOTES
Discharge Planner Post-Acute Rehab PT:     Discharge Plan: home with 24/7 family assistance, home PT    Precautions: falls, alarms, R hemiparesis,  Do not leave on commode/EOB alone, cognitive impairment,  Hmong  needed    Current Status:  Bed Mobility: mod A x1 to roll R, max A x1 to roll L  Transfer: Max A x1 sit<>stand and stand pivot in either direction   Gait: 20' L hallway railing with min A and R AFO with wheelchair follow  Stairs: not assessed  Balance: not assessed    Assessment:  AM session focused on gait at L hallway railing.  Noted improvement with repetition with weight shifting and balance.      Other Barriers to Discharge (DME, Family Training, etc):   Will need AFO, wheelchair and quad cane  Family Training

## 2023-04-16 NOTE — PLAN OF CARE
Goal Outcome Evaluation:      Plan of Care Reviewed With: patient    Overall Patient Progress: improvingOverall Patient Progress: improving    Outcome Evaluation: Family present for most of the day. Calls appropriately. Needs in reach and safety measures in place.

## 2023-04-16 NOTE — PLAN OF CARE
Goal Outcome Evaluation:      Plan of Care Reviewed With: patient    Overall Patient Progress: no changeOverall Patient Progress: no change     Pt A&Ox3, disoriented to time. Hmong speaking, daughter present to translate. Incont of bladder on shift, inconsistent with calling for incont cares, need to anticipate needs. Pt denies pain, SOB, headache,nausea or new numbness/tingling. No new skin issues. Call light in reach, daughter present at bedside, alarms on.

## 2023-04-16 NOTE — PROGRESS NOTES
"Discharge Planner Post-Acute Rehab PT:     Discharge Plan: home with 24/7 family assistance, home PT    Precautions: falls, alarms, R hemiparesis,  Do not leave on commode/EOB alone, cognitive impairment,  Hmong  needed    Current Status:  Bed Mobility: mod A x1 to roll R, max A x1 to roll L  Transfer: Max A x1 sit<>stand and stand pivot in either direction   Gait: 20' L hallway railing with min A and R AFO with wheelchair follow  Stairs: up/down x4 6\" stairs with x1 hand rail, max A  Balance: not assessed    Assessment:  AM session focused on gait at L hallway railing.  Noted improvement with repetition with weight shifting and balance. PM session focused on going up/down x4 stairs with max A and continued L hallway railing.  Noted improvement with motor planning for R foot placement and appropriate weight shifting to allow for better swing. -10 minutes missed during PM session for fatigue.      Other Barriers to Discharge (DME, Family Training, etc):   Will need AFO, wheelchair and quad cane  Family Training    "

## 2023-04-16 NOTE — PLAN OF CARE
Discharge Planner Post-Acute Rehab SLP:     Discharge Plan: Home with 24/7 supervision from family, ongoing speech therapy     Precautions: Disoriented, Mod-severe cognitive impairment, Hmong speaking (family interprets), falls risk, R hemiparesis    Current Status:  Hearing: Mild hearing loss per family report  Vision: WFL  Communication: Patient reports word-finding difficulties but overall able to make wants and needs known  Cognition: Moderate-Severe Cognitive impairment  Swallow: Easy to Chew (7) and Thin Liquids (0). Patient and family indicate that patient prefers softer foods. Will monitor if patient presents with any future difficulties.     Assessment: Patient engaged in alternating attention tasks with numbers. Unable to complete without max assistance. Did not show improvement with progression of the task. Engaged in picture problem solving cards. Requiring moderate level of cueing in order to consistently recognize the safety concerns in the photos. Requiring similar cues to generate solutions.    Other Barriers to Discharge (Family Training, etc): family training

## 2023-04-16 NOTE — PROGRESS NOTES
"    [facility]   Acute Rehabilitation Unit  Progress Note        Interval History:  Mercy Osman was seen at bedside this afternoon with her daughter. She is sitting up in wheelchair, and smiling doing ok this afternoon. Denies any CP, SOB, abdominal pain or headache. No other concerns this afternoon.    Addendum:  Contacted later by SW that patient scored an 18 on PHQ 9 later this afternoon, and has thoughts that she would rather not be here. Son  by suicide.       MEDICATIONS  Scheduled meds  Medications Prior to Admission   Medication Sig Dispense Refill Last Dose     amLODIPine (NORVASC) 10 MG tablet Take 1 tablet (10 mg) by mouth daily 90 tablet 0 2023 at 839     atorvastatin (LIPITOR) 20 MG tablet Take 1 tablet (20 mg) by mouth every evening 30 tablet 0 2023 at 2000     lisinopril (ZESTRIL) 20 MG tablet 1 tablet (20 mg) by Oral or Feeding Tube route daily 90 tablet 0 2023 at 839             PHYSICAL EXAM  VITAL SIGNS:  /75 (BP Location: Right arm)   Pulse 83   Temp 98.4  F (36.9  C) (Oral)   Resp 16   Ht 1.473 m (4' 10\")   Wt 50.5 kg (111 lb 5.3 oz)   SpO2 99%   BMI 23.27 kg/m    BMI:  Estimated body mass index is 23.27 kg/m  as calculated from the following:    Height as of this encounter: 1.473 m (4' 10\").    Weight as of this encounter: 50.5 kg (111 lb 5.3 oz).     General: NAD, seated in wheelchair.  HEENT: NC/AT, MMM  Pulmonary: non-labored on room air, lungs CTA bilaterally  Cardiovascular: RRR, no murmurs  Abdominal: soft, non-tender, non-distended, bowel sounds present  Extremities: warm, well perfused, no edema in bilateral lower extremities, no tenderness in calves   MSK/neuro:   Mental Status:  Alert, oriented     Speech: dysarthria,, word finding difficulties.   Gait: not tested  Skin: no gross abnormalities on visible skin      LABS  CBC RESULTS:   Recent Labs   Lab Test 23  0803 23  0813 23  0705   WBC 7.5 8.7 " 8.1   RBC 5.44* 5.32* 5.22*   HGB 13.7 13.4 13.0   HCT 45.1 44.0 43.1   MCV 83 83 83   MCH 25.2* 25.2* 24.9*   MCHC 30.4* 30.5* 30.2*   RDW 16.7* 16.9* 16.7*    345 318     Last Basic Metabolic Panel:  Recent Labs   Lab Test 23  0828 23  0803 23  2143 23  0842 23  0813 23  0759 23  0705   NA  --  139  --   --  139  --  138   POTASSIUM  --  4.0  --   --  4.0  4.0  --  3.8   CHLORIDE  --  106  --   --  105  --  103   CO2  --  20*  --   --  19*  --  20*   ANIONGAP  --  13  --   --  15  --  15   GLC 97 107* 186*   < > 99   < > 93   BUN  --  12.4  --   --  13.3  --  8.2   CR  --  0.76  --   --  0.72  --  0.66   GFRESTIMATED  --  >90  --   --  >90  --  >90   SALAZAR  --  9.6  --   --  9.6  --  9.5    < > = values in this interval not displayed.     Hemoglobin A1C   Date Value Ref Range Status   2023 6.2 (H) <5.7 % Final     Comment:     Normal <5.7%   Prediabetes 5.7-6.4%    Diabetes 6.5% or higher     Note: Adopted from ADA consensus guidelines.     Recent Labs   Lab Test 23  0842   *         IMPRESSION/PLAN:  Mercy Osman is a 52 year old right hand dominant female with a past medical history of hypertension, Webster' palsy who was admitted on 23 with spontaneous acute left thalamic intracranial hemorrhage with hospital course complicated by hyperlipidemia, prediabetes, elevated troponin, anemia, hypokalemia, and loose stools.  She is now admitted to ARU on 23 for multidisciplinary rehabilitation and ongoing medical management.      --Vitals stable. No lab today.  --Continue ongoing medical management. Scored 18 on PHQ 9 with SW, and has thoughts of not wanting to be here. Son  by suicide. Psychiatry consult placed.  --Continue therapies and plan of care.    Danette Ingram MD  Physical Medicine & Rehabilitation    I spent a total of 25 minutes face-to-face and managing the care of the patient. Over 50% of my time on the unit was spent counseling  the patient and coordinating care. See note for details.

## 2023-04-16 NOTE — PLAN OF CARE
Discharge Planner Post-Acute Rehab OT:     Discharge Plan: home w/ home OT w/ family verse OP OT, pt will possibly need 24/7 A    Precautions: Fall, R mir, do not leave on commode/EOB by herself, cognition  Hmong speaking patient, will need .    Current Status:  ADLs:    Mobility: Bed mobility min A. EOB sitting CGA, min A SPT toward pt's L side. Nsg sera stedy x2.    Grooming: Min A    Dressing: UB mod A, LB mod A brief and max A socks.  Pt damp after shower so anticipate progress during next session.     Bathing: Pt used the rolling shower chair but did SPT toward her L Min A. Pt bathed 5/10 body parts seated.     Toileting: Nsg sera stedy x2.  IADLs: TBD  Vision/Cognition: Vision appears functional, screen as appropriate. Pt is disoriented, impaired memory, impaired attention, impaired speech/communication, aphasia possibly and variably impulsive- responds well to safety cues.    Assessment: Shower: See above for bathing, ADL status, and transfer progress.  Dtr Kayli assisted with the shower.  Pt reports she is feeling stronger than yesterday.  She is able to follow cues well and motivated to return home.  Con't OT per POC.        Other Barriers to Discharge (DME, Family Training, etc):   Pt has a standard height toilet, walk in shower or tub/shower combo.  She is used to using the walk in shower and does not have any AE in the bathroom.   DME: TBD  Family training: Daughter/family will be present and sleeping over night at times. Continue ongoing family training as appropriate.

## 2023-04-16 NOTE — PLAN OF CARE
Goal Outcome Evaluation:      Plan of Care Reviewed With: patient    Overall Patient Progress: no change    Outcome Evaluation: pt.alert, slept most of te shift. Denies pain ,N/V, SOB and chest pain. Incontinent in bladder PVR 98ml.    FOCUS/GOAL  Pain management, Safety management, Skin Integrity     ASSESSMENT, INTERVENTIONS AND CONTINUING PLAN FOR GOAL:  Orientation: Alert, cooperates with care,with word finding difficulty  Bowel: continent in Bowel LBM 04/15/23, no BM this shift  Bladder: incontinent in bladder, PVR 98 ml  Pain: Denies pain, N/V, chest pain  Ambulation/Transfers: A2 liko lift  Diet/ Liquids: Easy to chew, thin, takes pills whole with apple sauce  Pt. denies pain, N/V, SOB and chest pain, Slept most of the shift, safety rouding done,  3 side rails UP, bed alarm ON, call light/bedside table in reach.Family at bedside.Continue with POC.

## 2023-04-17 ENCOUNTER — APPOINTMENT (OUTPATIENT)
Dept: PHYSICAL THERAPY | Facility: CLINIC | Age: 53
End: 2023-04-17
Attending: PHYSICAL MEDICINE & REHABILITATION
Payer: COMMERCIAL

## 2023-04-17 ENCOUNTER — APPOINTMENT (OUTPATIENT)
Dept: SPEECH THERAPY | Facility: CLINIC | Age: 53
End: 2023-04-17
Attending: PHYSICAL MEDICINE & REHABILITATION
Payer: COMMERCIAL

## 2023-04-17 ENCOUNTER — APPOINTMENT (OUTPATIENT)
Dept: OCCUPATIONAL THERAPY | Facility: CLINIC | Age: 53
End: 2023-04-17
Attending: PHYSICAL MEDICINE & REHABILITATION
Payer: COMMERCIAL

## 2023-04-17 LAB
ANION GAP SERPL CALCULATED.3IONS-SCNC: 12 MMOL/L (ref 7–15)
BUN SERPL-MCNC: 12.4 MG/DL (ref 6–20)
CALCIUM SERPL-MCNC: 10 MG/DL (ref 8.6–10)
CHLORIDE SERPL-SCNC: 104 MMOL/L (ref 98–107)
CREAT SERPL-MCNC: 0.71 MG/DL (ref 0.51–0.95)
DEPRECATED HCO3 PLAS-SCNC: 23 MMOL/L (ref 22–29)
ERYTHROCYTE [DISTWIDTH] IN BLOOD BY AUTOMATED COUNT: 16.5 % (ref 10–15)
GFR SERPL CREATININE-BSD FRML MDRD: >90 ML/MIN/1.73M2
GLUCOSE SERPL-MCNC: 182 MG/DL (ref 70–99)
HCT VFR BLD AUTO: 42.6 % (ref 35–47)
HGB BLD-MCNC: 12.9 G/DL (ref 11.7–15.7)
MCH RBC QN AUTO: 25.2 PG (ref 26.5–33)
MCHC RBC AUTO-ENTMCNC: 30.3 G/DL (ref 31.5–36.5)
MCV RBC AUTO: 83 FL (ref 78–100)
PLATELET # BLD AUTO: 388 10E3/UL (ref 150–450)
POTASSIUM SERPL-SCNC: 3.5 MMOL/L (ref 3.4–5.3)
RBC # BLD AUTO: 5.12 10E6/UL (ref 3.8–5.2)
SODIUM SERPL-SCNC: 139 MMOL/L (ref 136–145)
WBC # BLD AUTO: 8.1 10E3/UL (ref 4–11)

## 2023-04-17 PROCEDURE — 128N000003 HC R&B REHAB

## 2023-04-17 PROCEDURE — 97535 SELF CARE MNGMENT TRAINING: CPT | Mod: GO | Performed by: STUDENT IN AN ORGANIZED HEALTH CARE EDUCATION/TRAINING PROGRAM

## 2023-04-17 PROCEDURE — 97129 THER IVNTJ 1ST 15 MIN: CPT | Mod: GN

## 2023-04-17 PROCEDURE — 85027 COMPLETE CBC AUTOMATED: CPT | Performed by: PHYSICIAN ASSISTANT

## 2023-04-17 PROCEDURE — 36415 COLL VENOUS BLD VENIPUNCTURE: CPT | Performed by: PHYSICIAN ASSISTANT

## 2023-04-17 PROCEDURE — 250N000013 HC RX MED GY IP 250 OP 250 PS 637: Performed by: PHYSICIAN ASSISTANT

## 2023-04-17 PROCEDURE — 97535 SELF CARE MNGMENT TRAINING: CPT | Mod: GO

## 2023-04-17 PROCEDURE — 97112 NEUROMUSCULAR REEDUCATION: CPT | Mod: GO

## 2023-04-17 PROCEDURE — 97167 OT EVAL HIGH COMPLEX 60 MIN: CPT | Mod: GO

## 2023-04-17 PROCEDURE — 97130 THER IVNTJ EA ADDL 15 MIN: CPT | Mod: GN

## 2023-04-17 PROCEDURE — 97112 NEUROMUSCULAR REEDUCATION: CPT | Mod: GP

## 2023-04-17 PROCEDURE — 97530 THERAPEUTIC ACTIVITIES: CPT | Mod: GO

## 2023-04-17 PROCEDURE — 80048 BASIC METABOLIC PNL TOTAL CA: CPT | Performed by: PHYSICIAN ASSISTANT

## 2023-04-17 PROCEDURE — 97110 THERAPEUTIC EXERCISES: CPT | Mod: GO

## 2023-04-17 PROCEDURE — 99253 IP/OBS CNSLTJ NEW/EST LOW 45: CPT | Performed by: REGISTERED NURSE

## 2023-04-17 PROCEDURE — 99232 SBSQ HOSP IP/OBS MODERATE 35: CPT | Mod: FS | Performed by: PHYSICAL MEDICINE & REHABILITATION

## 2023-04-17 PROCEDURE — 97750 PHYSICAL PERFORMANCE TEST: CPT | Mod: GP

## 2023-04-17 RX ADMIN — ACETAMINOPHEN 325MG 650 MG: 325 TABLET ORAL at 21:48

## 2023-04-17 RX ADMIN — SENNOSIDES AND DOCUSATE SODIUM 2 TABLET: 50; 8.6 TABLET ORAL at 20:26

## 2023-04-17 RX ADMIN — LISINOPRIL 20 MG: 20 TABLET ORAL at 10:24

## 2023-04-17 RX ADMIN — AMLODIPINE BESYLATE 10 MG: 10 TABLET ORAL at 10:24

## 2023-04-17 RX ADMIN — ATORVASTATIN CALCIUM 20 MG: 10 TABLET, FILM COATED ORAL at 20:25

## 2023-04-17 ASSESSMENT — ACTIVITIES OF DAILY LIVING (ADL)
ADLS_ACUITY_SCORE: 42
ADLS_ACUITY_SCORE: 41
ADLS_ACUITY_SCORE: 41
ADLS_ACUITY_SCORE: 43
ADLS_ACUITY_SCORE: 42
ADLS_ACUITY_SCORE: 41
ADLS_ACUITY_SCORE: 42
ADLS_ACUITY_SCORE: 41
ADLS_ACUITY_SCORE: 43
ADLS_ACUITY_SCORE: 41
ADLS_ACUITY_SCORE: 43
ADLS_ACUITY_SCORE: 43

## 2023-04-17 NOTE — PROGRESS NOTES
Discharge Planner Post-Acute Rehab PT:     Discharge Plan: home with 24/7 family assistance, home PT    Precautions: falls, alarms, R hemiparesis,  Do not leave on commode/EOB alone, cognitive impairment,  Hmong  needed    Current Status:  Bed Mobility: MOD A with rail  Transfer: Squat pivot MIN A with skilled setup, Pam Gudino with RN staff  Gait: not functional  Stairs: MAX A   Balance:    Ponce: 7/56 on    PASS: 13/36 on     Assessment: Eager to participate, and progressed to low squat pivot transfers with MIN A. Will initiate FES for RLE tomorrow. Anticipate need for w/c for home d/t low Ponce and PASS scores.     Other Barriers to Discharge (DME, Family Training, etc):   Will need AFO, wheelchair and quad cane  Family Training

## 2023-04-17 NOTE — PLAN OF CARE
Discharge Planner Post-Acute Rehab SLP:     Discharge Plan: Home with 24/7 supervision from family, ongoing speech therapy     Precautions: Disoriented, Mod-severe cognitive impairment, Hmong speaking (family interprets), falls risk, R hemiparesis    Current Status:  Hearing: Mild hearing loss per family report  Vision: WFL  Communication: Patient reports word-finding difficulties but overall able to make wants and needs known  Cognition: Moderate-Severe Cognitive impairment  Swallow: Easy to Chew (7) and Thin Liquids (0). Patient and family indicate that patient prefers softer foods. Will monitor if patient presents with any future difficulties.     Assessment: Picture problem solving cards. Patient improved attention to details as compared to yesterday but continues to require moderate level of cueing to generate appropriate solutions. patient quick to laugh at herself and recognizing her answers were not correct. Patient's family expressing that she is having some word finding difficulties at times with patient utilizing incorrect words or unable to state the correct information.    Other Barriers to Discharge (Family Training, etc): family training

## 2023-04-17 NOTE — DISCHARGE INSTRUCTIONS
"PCP  You are scheduled to see Ara Roger CNP on May 12 2023 at 10:55 am.    Address  [address]   [place] [address]    Phone   [phone]  Fax:  [phone]      Neurology  You are scheduled to see Simran Turner CNP on May 31 2023 at 8:00 am.    Address  [address]   [facility] [address]    Phone   [phone]       [facility] :   : [phone]  *I placed a referral for \"[facility] assessment\" to get PCA services on 05/05/23. They have Kayli's number to call and follow-up and schedule the assessment. You may call to check on the status of the referral and assessment, if needed.     [facility] [facility]  Additional resources and contact information online   Www.strokemn.org & www.braininjurymn.org  Types of services that Stroke/Brain Injury Resource Facilitation offers include:  Emotional support in coping with a  new normal   Finding mental health support and counselors who understand brain injury and stroke  Finding a support group  Finding or re-connecting to rehabilitation services  Finding where and how to get a brain injury assessed  Finding or re-connecting with a primary care physician  Supporting caregivers by connecting them with resources  Education about diagnosis and symptoms -  Is this normal   Helping educate family and loved ones of the survivor s  invisible  symptoms  Figuring out the logistics of returning to work, vocational rehab and understanding ADA rights  If not going back to work, support in finding meaningful ways to structure your day and exploring volunteer options  Coaching on navigation the federal, state and county health and disability service system with additional support and conference calls as needed  Help finding appropriate legal support for appealing and navigating Social Security Disability, providing advocacy on the individual s behalf as needed and connecting with cost effective alternatives to " " as needed  Supporting parents/students with how to discuss return to school and sports with educators and connecting to a TBI specialist or parent advocate group if needed  Connecting to addiction (alcohol/drug/gambling/smoking) support and treatment services  Support with creative problem solving to life barriers.  *These are just a few examples of common things that Resource Facilitation can help with. They are not limited to what is listed here so if you are curious if they can help, just ask.   Call us at [phone] or [phone].      To reduce the risk of subsequent stroke there are several important factors including optimal management of anticoagulants, blood pressure, cholesterol, diabetes and smoking abstinence.    Anticoagulation:  You are not on anticoagulation due to hemorrhagic stroke.    Blood Pressure:  Keeping your blood pressures less than 130/80 has been shown to reduce risk of recurrent stroke. Recording your blood pressure and heart rate once daily in a log book can help you and your providers make decisions on optimal management. You are encouraged to bring your log book with you to your primary physician and/or cardiology doctor visits.    You are currently on amlodipine, lisinopril to help control your blood pressure. Several lifestyle modifications have been associated with blood pressure reduction and are an important part of a comprehensive plan. These include: weight loss (if over-weight); a diet low in salt and cholesterol and rich in fruits and vegetables; regular aerobic physical activity and limited alcohol consumption.    Diabetes:  Optimal management of diabetes not only reduces risk of another stroke, it can help with healing process from your recent stroke. Blood glucose levels measure how well you're controlling your diabetes. An additional test \"hemoglobin A1C\" reflects how you have been overall with glucose control in the prior few months. This should be less than 7 " "though even lower below 6 is considered normal. Your recent Hgb A1C was 6.2% which is indicative of pre-diabetes. This should be followed up with your primary provider and/or endocrinologist.    Diet:  Diet and exercise are very important for diabetes regardless of being on medication or not. Be aware of and moderate your carbohydrate intake as instructed by doctor, dietitian or nurse.  Regular physical activity may be more difficult since your stroke though doing what you can on a daily basis is helpful.     Cholesterol:  Traditional target levels for LDL cholesterol or \"bad cholesterol\" is less than 130 however once you have had a stroke, your target LDL level is now less than 70. Additional recommendations such as increasing your HDL or \"good\" cholesterol and lowering your triglyceride level can also be important.    Your most recent lipid panel was   No results found for: CHOL  No results found for: HDL  Lab Results   Component Value Date     04/08/2023     No results found for: TRIG  No results found for: CHOLHDLRATIO    This should be followed up in 2-3 months with your primary provider.    Smoking:  Finally one of the most important modifiable risk factors is to not smoke. This includes cigarettes, pipes, cigars, chewing tobacco and second hand smoke. Support through counseling, nicotine replacement, and oral smoking-cessation medications may all be helpful. Often people have been able to quit during their hospitalization but once returning to their familiar environment, the urges can be stronger. If this is the case, we encourage you to get support. There are numerous options, start by talking with your doctor.  "

## 2023-04-17 NOTE — PROGRESS NOTES
04/17/23 1400   Signing Clinician's Name / Credentials   Signing clinician's name / credentials Rosa M Lora DPBARBY   Ponce Balance Scale (KVNG ROMAN, SONIA S, LUDY VARELA, CHEN ROSS: MEASURING BALANCE IN THE ELDERLY: VALIDATION OF AN INSTRUMENT. CAN. J. PUB. HEALTH, JULY/AUGUST SUPPLEMENT 2:S7-11, 1992.)   Sit To Stand 1   Standing Unsupported 0   Sitting Unsupported 4   Stand to Sit 1   Transfers 1   Standing with Eyes Closed 0   Standing Unsupported, Feet Together 0   Reach Forward With Outstretched Arm 0   Retrieve Object From Floor 0   Turning to Look Behind 0   Turn 360 Degrees 0   Placing Alternate Foot on Stool (4-6 inches) 0   Unsupported Tandem Stand (Demonstrate to Subject) 0   One Leg Stand 0   Total Score (A score of 45 or less has been correlated with an increased risk of falls)   Total Score (out of 56) 7     Ponce Balance Scale (BBS) Cutoff Scores for CVA Population:    The BBS is a measure of static and dynamic standing balance that has been validated in community dwelling elderly individuals and individuals who have Parkinson's Disease, MS, and those who are s/p CVA and TBI. The test is administered without an assistive device. Scores from the Ponce are used to determine the probability of falling based on the patient's previous history of falls and their test performance.     0-20 High risk for falling- Corresponded with w/c bound status  21-40 Medium risk for falling- Able to walk with assistance  41-56 Low risk for falling- Able to walk independently  According to [facility].  Available at http://www.strokecenter.org/.  Accessibility verified April 10, 2013.  Minimal Detectable Change = 6.5 according to Og & Seney 2008    Assessment (rationale for performing, application to patient s function & care plan): Basic standing balance severely impaired.

## 2023-04-17 NOTE — PLAN OF CARE
Goal Outcome Evaluation:      Plan of Care Reviewed With: patient    Overall Patient Progress: no changeOverall Patient Progress: no change     Pt A&Ox3, disoriented to time. Hmong speaking, daughter present in room.  A2 sarastedy. Pt incont of bladder on shift. No BM on shift. No new skin issues. Pt denies pain, SOB, headache, nausea or new numbness/tingling. Call light in reach, alarms on, calls appropriately.

## 2023-04-17 NOTE — PROGRESS NOTES
"  [facility]   Acute Rehabilitation Unit  Daily progress note    INTERVAL HISTORY  Weekend and therapy notes reviewed, no acute events reported.    Mercy Osman was seen and examined at bedside this afternoon with 2 children and spouse present,  assisting via phone.  Patient reports that overall she is feeling well, she is noting some gradual improvements with therapies, states it is \"helping\".  She notes some generalized body aches \"now and then\" but denies any localized pain.  She notes intermittent dizziness, that she states happens \"out of nowhere\" rather than during specific activities (or with position changes).  She does endorse mild headache.  Notes intermittent shortness of breath with activity and feeling like her throat is dry.  She reports appetite is \"ok\", able to eat a little.  Last BM was earlier today.      States she is not sleeping well, does not attribute to environmental factors but rather just everything she has been through.  She does endorse feeling more down/depressed since her stroke.  Met with psychiatry provider this morning and discussed possible medication trial.  Notes she would like to think about it for a few days and let us know.  Also offered psychology supports, but she would also like to think about that and let us know in a few days.  Patient and family deny other questions or concerns at this time.    Functionally, she is currently needing mod A for bed mobility, min A for squat pivot transfers with skilled set-up or roxane stedy with nursing.  She scored 13/36 on PASS, predictive of likely need for wheelchair at discharge from ARU.  She scored 7/56 on Ponce balance scale, signifying high falls risk.    MEDICATIONS    amLODIPine  10 mg Oral Daily     atorvastatin  20 mg Oral QPM     lisinopril  20 mg Oral Daily     polyethylene glycol  17 g Oral Daily     senna-docusate  2 tablet Oral At Bedtime        acetaminophen, hydrALAZINE, " "melatonin     PHYSICAL EXAM  /76 (BP Location: Left arm)   Pulse 88   Temp 98.2  F (36.8  C) (Oral)   Resp 19   Ht 1.473 m (4' 10\")   Wt 50.5 kg (111 lb 5.3 oz)   SpO2 96%   BMI 23.27 kg/m    Gen: NAD, seated upright in chair  HEENT: NC/AT, MMM  Cardio: appears well-perfused  Pulm: non-labored on room air  Abd: soft, non-tender, non-distended  Ext: slight edema in right hand, glove in place  Neuro/MSK: awake, alert, PERRL, left upper facial weakness, right lower facial droop, right hemiparesis, right hemisensory impairment    LABS  CBC RESULTS: Recent Labs   Lab Test 04/14/23  0803 04/13/23  0813 04/12/23  0705   WBC 7.5 8.7 8.1   RBC 5.44* 5.32* 5.22*   HGB 13.7 13.4 13.0   HCT 45.1 44.0 43.1   MCV 83 83 83   MCH 25.2* 25.2* 24.9*   MCHC 30.4* 30.5* 30.2*   RDW 16.7* 16.9* 16.7*    345 318     Last Basic Metabolic Panel:  Recent Labs   Lab Test 04/14/23  0828 04/14/23  0803 04/13/23  2143 04/13/23  0842 04/13/23  0813 04/12/23  0759 04/12/23  0705   NA  --  139  --   --  139  --  138   POTASSIUM  --  4.0  --   --  4.0  4.0  --  3.8   CHLORIDE  --  106  --   --  105  --  103   CO2  --  20*  --   --  19*  --  20*   ANIONGAP  --  13  --   --  15  --  15   GLC 97 107* 186*   < > 99   < > 93   BUN  --  12.4  --   --  13.3  --  8.2   CR  --  0.76  --   --  0.72  --  0.66   GFRESTIMATED  --  >90  --   --  >90  --  >90   SALAZAR  --  9.6  --   --  9.6  --  9.5    < > = values in this interval not displayed.       Rehabilitation - continue comprehensive acute inpatient rehabilitation program with multidisciplinary approach including therapies, rehab nursing, and physiatry following. See interval history for updates.      ASSESSMENT AND PLAN  Mercy Osman is a 52 year old right hand dominant female with a past medical history of hypertension, Endicott' palsy who was admitted on 4/8/23 with spontaneous acute left thalamic intracranial hemorrhage with hospital course complicated by hyperlipidemia, prediabetes, " elevated troponin, anemia, hypokalemia, and loose stools.  She is now admitted to ARU on 4/14/23 for multidisciplinary rehabilitation and ongoing medical management.        Admission to acute inpatient rehab 4/14/23.    Impairment group code: Stroke Vascular Hemorrhagic 01.2 (R) Body Involvement (L) Brain -  left basal ganglia hemorrhage        1. PT, OT and SLP 60 minutes of each on a daily basis, in addition to rehab nursing and close management of physiatrist.       2. Impairment of ADL's: Noted to have impaired activity tolerance, impaired balance, impaired coordination, impaired ROM, impaired sensation, impaired strength w/ R hemiparesis, impaired motor control, fatigue, dizziness, and impaired weight shifting, all affecting her ability to safely and independently perform basic ADLs.  Goal for min A or less with basic ADLs.     3. Impairment of mobility:  Noted to have impaired activity tolerance, impaired balance, impaired coordination, impaired ROM, impaired sensation, impaired strength w/ R hemiparesis, impaired motor control, fatigue, dizziness, and impaired weight shifting, all affecting her ability to safely and independently perform basic mobility.  Goal for min A or less with basic mobility.     4. Impairment of cognition/language/swallow:  Noted to have dysarthria and dysphagia, and would benefit from full cognitive-linguistic evaluation at ARU with goals for improved cognitive-linguistic skills to meet basic needs and safe tolerance of least restrictive diet.     5. Medical Conditions  New actions/orders/updates for today are in blue.     Left basal ganglia intraparenchymal hemorrhage, spontaneous, likely secondary to uncontrolled hypertension  Vasogenic cerebral edema  Deficits: right hemiparesis, right facial droop, right hemisensory deficit, right visual neglect, dysarthria, ?aphasia, dysphagia, suspect impaired cognition  - Long-term BP goal 140/90 or less, management as below  - Stroke PLC  "completed 4/10 (during inpatient stay)  - Continue PT/OT/SLP  - Follow up with neurology 6-8 weeks after your discharge      Hypertension  PTA on amlodipine-benazepril 10-20 mg capsule.  BP elevated to 220s/100s at presentation.  Initially managed on nicardipine drip, then transitioned to PO lisinopril + amlodipine.  - Long-term BP goal 140/90 or less  - Continue Lisinopril 20mg daily  - Continue amlodipine 10mg daily  - PRN hydralazine for SBP >160  - Monitor BP, adjust regimen as indicated     Hyperlipidemia  Started on statin therapy this admission with .  - Continue atorvastatin 20 mg daily     Troponin elevation, asymptomatic, spontaneously resolved  EKG without obvious acute ischemic changes.  Echo WNL.  No cardiogenic symptoms, suspect transient stress related troponin leak.  Peaked to 113 on 4/10.  Troponin improved spontaneously without intervention.  - Further work-up if any cardiogenic symptoms develop     Hypokalemia, resolved s/p replacement  Hyperchloremia, resolved with adequate hydration   - Trend BMP every Monday.  : K WNL 3.5, Cl   - Replete as needed per protocol     Pre-diabetes   Hgb A1c 6.2% 2023   - Monitor with surveillance with primary care outpatient  - No routine BG checks indicated, monitor periodically with BMP     Anemia, chronic   Hgb 11.5 on presentation.  WNL at 13.4 as of .  - Hgb goal >7, plt goal >50k  - Trend CBC every Monday.  : Hgb WNL 12.9    Major Depressive Disorder, recurrent episode, moderate  SW intake with PHQ-9 score 18, did indicate some passive suicidal ideation.  Per family, have noted some symptoms of depression since patient's son  of suicide ~3 years ago.  - Psychiatry consulted over weekend, appreciate assistance.  Met with patient today.  Discussed potential medication trial, but patient would like to \"think about it\".  If agreeable, psychiatry recommending Remeron 7.5 mg at HS.  - Also offered health psychology supports.  Again " patient notes she would like to think about it for a few days and let us know.  Will plan to revisit.       6. Adjustment to disability:  Clinical psychology to eval and treat if indicated  7. FEN: easy to chew (level 7) diet, thin liquids  8. Bowel: continent, recent loose stools, monitor, PRN and scheduled bowel meds available  9. Bladder: continent/incontinent, monitor PVRs at admission  10. DVT Prophylaxis: mechanical  11. GI Prophylaxis: not indicated  12. Code: full  13. Disposition: goal for home with family support  14. ELOS:  target 5/5/23  15. Follow up Appointments on Discharge: PCP in 1-2 weeks, neurology in 6-8 weeks after discharge        Patient was discussed with Dr. Erasmo Diaz, PM&R Staff Physician    Fabi Virgen PA-C  Physical Medicine & Rehabilitation

## 2023-04-17 NOTE — PLAN OF CARE
Discharge Planner Post-Acute Rehab OT:     Discharge Plan: Addendum: Home w/ home OT w/ family and 24/7 A    Precautions: Fall, R mir, do not leave on commode/EOB by herself, cognition  Hmong speaking patient, will need .    Current Status:  ADLs:    Mobility: Bed mobility min A. EOB sitting CGA, min A SPT toward pt's L side. Nsg sera stedy x2.    Grooming: Min A    Dressing: UB mod A, LB mod A brief and max A socks.  Pt damp after shower so anticipate progress during next session.     Bathing: Pt used the rolling shower chair but did SPT toward her L Min A. Pt bathed 5/10 body parts seated.     Toileting: Nsg sera stedy x2.  IADLs: TBD  Vision/Cognition: Vision appears functional, screen as appropriate. Pt is disoriented, impaired memory, impaired attention, impaired speech/communication, aphasia possibly and variably impulsive- responds well to safety cues.    Assessment: Family present w/ pt. This OT speaking in pt's native language. Family reports pt appears stronger since arrival to unit over the weekend. Pt engaged in RUE gravity A/elminated isolated ex and facilitated Xcite. Pt remains significantly non-functional on RUE but some activity noted in 2nd digit this date, some active shoulder and elbow. Pt progressed to standing and L/R weight shift w/ heavy assist of L UE on sousa way railing w/ max cues and moderate physical assistance. Progress to squat pivot verse stand pivot transfers when appropriate.     XCite stim unit for Activity Based Therapy. Skilled set up; determined appropriate FES parameters for each muscle group based off of strong tetanic response of muscle test.  Used the following activities from the software library: grasp/release  Intervention and patient response: Pt mainly demonstrated passive ROM in finger flex/ext w/ cues to engage active muscles as able. Pt engaged in 5 mins w/ brief period of break.          Other Barriers to Discharge (DME, Family Training, etc):   Pt has a  standard height toilet, walk in shower or tub/shower combo.  She is used to using the walk in shower and does not have any AE in the bathroom.   DME: TBD  Family training: Daughter/family will be present and sleeping over night at times. Continue ongoing family training as appropriate.

## 2023-04-17 NOTE — PLAN OF CARE
FOCUS/GOAL  Bowel management, Bladder management, Pain management, Mobility, Skin integrity, and Safety management    ASSESSMENT, INTERVENTIONS AND CONTINUING PLAN FOR GOAL:   Patient is alert and oriented x 3. Patient slept well through the night. Patient is Hmong speaking, daughter present in room.  Is assist of x 2 with andra. Patient was incontinent of bladder on this shift. No BM on shift. No new skin issues. Patient denies pain. Call light is within reach, and alarm is on. Nursing staff will continue with.  Goal Outcome Evaluation:

## 2023-04-17 NOTE — CONSULTS
"      Initial Psychiatric Consult   Consult date: April 17, 2023         Reason for Consult, requesting source:    Scored high on PHQ 9 for depression, family history of suicide, thoughts of not wanting to be here, these thoughts are daily.    Requesting source: Erasmo Diaz    Labs and imaging reviewed. Notes reviewed and nursing consulted.     Total time spent in card review, patient interview and coordination of care: 45 minutes        HPI:   Psychiatry seeing patient today to assess presence of depression and possible suicidal ideation.    Per neurology provider from 4/8/2023: \"Ms. Mercy Osman is a 52 year old woman with a past medical history of HTN (on amlodipine-benazepril 10-20), history of prior Bell's Palsy admitted on 04/08/23 as a transfer from [facility] for intracranial hemorrhage.     Per chart review she presented 4/7/23 to [facility] ED via EMS after noted right sided weakness and facial droop. Last known well was 4/7 at 23:47. Per EHR review Ms. Osman was walking downstairs and fell due to sudden onset of right arm and leg weakness. She did not have obvious head trauma and she is not currently on anticoagulation. Stroke code activated upon arrival (see fellow note for full details). She was found to have a left basal ganglia hemorrhage. BP on arrival  To /115. Noted to be hypokalemic to 2.6, repleted. Started on a nicardipine gtt.\"    PHQ9 was 18 on 4/17/2023.    Patient sitting in wheelchair in room with her daughter, Kayli, by bedside. Today, patient declines to utilize an , and preferred to have her daughter translate. Patient reports having felt down and depressed \"for a very long time.\" She shares that she has been through many difficult things in her life, including the death of her infant son and her son's death by suicide three years ago. She reports that she did not see a therapist following his passing. Patient denies feeling suicidal at this time. We " "discussed interventions, such as therapy or medications, but patient declined these at this time. Patient denies feeling anxious and sleeps well at night. Daughter reports that patient takes Melatonin gummies at home to help her sleep.           Past Psychiatric History:   No psychiatric history, but patient does report long history of low mood. She has not been treated for her depression. No symptoms of bipolar disorder or schizophrenia.         Substance Use and History:     Tobacco Use     Smoking status: Not on file     Smokeless tobacco: Not on file   Vaping Use     Vaping status: Not on file   Substance Use Topics     Alcohol use: Not on file           Past Medical History:   PAST MEDICAL HISTORY: No past medical history on file.    PAST SURGICAL HISTORY: No past surgical history on file.          Family History:   FAMILY HISTORY: No family history on file.        Social History:   Patient is originally from [facility]. She has 10 children, two having passed away. She lives independently at home, with her daughter, Kayli, offering assistance with much of her care.          Physical ROS:   The 10 point Review of Systems is negative other than noted in the HPI or here.           Medications:       amLODIPine  10 mg Oral Daily     atorvastatin  20 mg Oral QPM     lisinopril  20 mg Oral Daily     polyethylene glycol  17 g Oral Daily     senna-docusate  2 tablet Oral At Bedtime              Allergies:   No Known Allergies       Labs:   No results found for this or any previous visit (from the past 48 hour(s)).       Physical and Psychiatric Examination:     /76 (BP Location: Left arm)   Pulse 88   Temp 98.2  F (36.8  C) (Oral)   Resp 19   Ht 1.473 m (4' 10\")   Wt 50.5 kg (111 lb 5.3 oz)   SpO2 96%   BMI 23.27 kg/m    Weight is 111 lbs 5.32 oz  Body mass index is 23.27 kg/m .    Physical Exam:  I have reviewed the physical exam as documented by by the medical team and agree with findings and assessment and " "have no additional findings to add at this time.         MSE:   Appearance: awake, alert  Attitude:  cooperative  Eye Contact:  fair  Mood:  \"fair\"  Affect:  : slightly restricted, somewhat flat  Speech:  clear, coherent  Psychomotor Behavior:  no evidence of tardive dyskinesia, dystonia, or tics  Muscle strength and tone: Appears average or slightly below  Thought Process:  logical and linear  Associations:  no loose associations  Thought Content:  no evidence of suicidal ideation or homicidal ideation, no evidence of psychotic thought, no auditory hallucinations present and no visual hallucinations present  Insight:  fair  Judgement:  fair  Oriented to:  Oriented x3  Attention Span and Concentration:  fair  Recent and Remote Memory:  fair             DSM-5 Diagnosis:   296.32 (F33.1) Major Depressive Disorder, Recurrent Episode, Moderate _          Assessment:   Patient has been through many stressful events that have understandably impacted her mood. She declines intervention at this time, however, might most benefit from individual therapy if she changes her mind. If she would like to try pharmacological intervention, we could start with Remeron 7.5 mg at HS to mange depression. With her history of intracranial hemorrhage, would should avoid SSRIs at this time.     Patient denies feeling suicidal at this time and is not a safety concern.           Summary of Recommendations:   Consider referral to individual therapy if patient is interested    Could also consider Remeron 7.5 mg at HS if patient would like to try a medication for depression.           Page me or re-consult psychiatry as needed (psychiatry is signing off).       Hafsa Mckeon, CELINAP, APRN  Consult/Liaison Psychiatry  [facility]   Contact information available via [facility] Paging/Directory.  If I am not available, please call [facility] intake ([phone])              "

## 2023-04-17 NOTE — PROGRESS NOTES
Hmong speaking. Daughter/son at bedside to interpret. Assist of two with Pam Almaguer. Incontinent of bladder. Last BM 4/16. Regular diet. Prefers soft food. Takes pills in applesauce. Denies pain.     Patient's most recent vital signs are:     Vital signs:  BP: 114/76  Temp: 98.2  HR: 88  RR: 19  SpO2: 96 %     Patient does not have new respiratory symptoms.  Patient does not have new sore throat.  Patient does not have a fever greater than 99.5.

## 2023-04-17 NOTE — PROGRESS NOTES
Postural Assessment for Stroke Scale (PASS)    Maintaining posture -   1) Sitting without support - 3  2) Standing with support (feet position free) - 2  3) Standing without support (feet position free) - 0  4) Standing on nonparetic leg - 0  5) Standing on paretic leg - 0    Changing posture -  6) Rolling supine -> affected side - 2  7) Rolling supine -> unaffected side - 2  8) Sup->sitting edge of bed - 1  9) Sitting edge of bed -> sup - 1  10) Sit->stand without support - 1  11) Stand->sit without support - 2  12) Standing,  pencil from floor without support - 0    Total Score - 13/36    The PASS assesses a patient's ability to change and maintain posture during different balance activities. It is not associated with falls risk, but is used to track progress with the patient's ability to control their posture and balance throughout the course of the patient's admission.    A score of <22 points at admission to Acute Rehab is predictive that pt is not likely to be walking independently at 3 months.   (Vincent et al. 2021. Postural Maintenance Is Associated with Walking Ability in People Received Acute Rehabilitation after Stroke)

## 2023-04-17 NOTE — CONSULTS
Family completed class on 4/10. Not needed again    Esau Jason RN  Patient [facility]  [phone]

## 2023-04-17 NOTE — PLAN OF CARE
Individualized Overall Plan Of Care (IOPOC)      Rehab diagnosis/Impairment Group Code: Stroke vascular hemorrhagic 01.2 (r) body involvement (l) brain -  left basal ganglia hemorrhage  Ich (intracerebral hemorrhage) (h)       Expected functional outcome:  Anticipate with intensive therapies, close medical management, and rehabilitative nursing care the patient will improve strength, balance, tolerance to activity, safety to ensure Tal or less with basic mobility and ADL performance to allow return home with family assistance    Clinical Impression Comments: 52 year old woman with a past medical history of HTN (on amlodipine-benazepril 10-20), history of prior Bell's Palsy admitted on 04/08/23 as a transfer from [facility] for intracranial hemorrhage.  Per EHR review Ms. Osman was walking downstairs and fell due to sudden onset of right arm and leg weakness. She did not have obvious head trauma and she is not currently on anticoagulation. Stroke code activated upon arrival. She was found to have a left basal ganglia hemorrhage. BP on arrival to /115. Noted initially to be hypokalemic to 2.6, repleted. Started on a nicardipine gtt which has been off since 4/8. The stroke has been attributed likely to uncontrolled hypertension. She has also needed ongoing medical mgt of her pre-diabetes, electrolyte imbalances, and HTN.    Mobility: 52 y.o. R handed female presents s/p ICH.  Prior to admission, pt was independent in all areas of mobility and ADLs without the use of an assistive device or adaptive equipment.  Currently, pt requires mod/max A x1-2 for mobility, heavy reliance on a quad cane for standing balance, and frequent rest breaks due to fatigue. Pt has significant R hemiparesis requiring R AFO and R UE support for mobility and positioning.  Pt would benefit from at least 2 weeks of acute inpatient physical therapy to address impairments and functional limitations associated with her stroke and  provide family training for a safe discharge plan.    ADL: 52 year old w/ past medical history of hypertension, [place]' palsy who was admitted on 4/8/23 with spontaneous acute left thalamic intracranial hemorrhage with hospital course complicated by hyperlipidemia, prediabetes, elevated troponin, anemia, hypokalemia, and loose stools.  Presenting w/  impaired activity tolerance, impaired balance, impaired coordination, impaired ROM, impaired sensation, impaired strength w/ R hemiparesis, impaired motor control, fatigue and impaired weight shifting, all affecting her ability to safely and independently perform basic ADLs.  Goal for min A or less with basic ADLs and possibly 24/7 assistance. Per chart review, daughter may be able to provide 24/7 assistance w/ goal of home. Daughter was present during eval and this OT speaking in pt's native language.    Communication/Cognition/Swallow: Informal cognitive assessment completed. Patient is Hmong speaking. Patient's son present and intrepreted during session today. Patient is presenting with moderate-severe cognitive impairments in areas of orientation, memory, attention, and executive functioning. Patient disoriented to place, time, and situation even with guidance and cues. D/t patient's deficits and language barrier, some subtests were not completed. Significant impairments in memory observed throughout task. Pt performed delayed recall task with 0% accuracy. Further assessment recommended to gain more insight into patient's level of deficits during functional tasks. Skilled intervention is recommended to treat areas of attention, memory, executive functioning and orientation.     Intensity of therapy:   PT 60 minutes, Daily, for 12 days  OT 60 minutes, Daily, for 12 days  SLP 60 minutes, Daily , for 12 days    Orthotics shoulder brace, wrist splint and AFO  Education medication education, positioning, carryover of new rehab techniques, care coordination, blood sugar  management, diabetes education, assess neurologic status, stroke education and provide safe environment for patient at falls risk.  Neuropsychology Testing: No  Other:  None      Medical Prognosis: Fair (forecast of the probable outcome or course of disease, consider  diagnosis, premorbid level of function, medical complexities)      Physician summary statement: In addition to above statements address, Patient requires intensive active and ongoing therapeutic intervention and multiple therapies; Patient requires medical supervision; Expected to actively participate in the intensive rehab program; Sufficiently stable to actively participate; Expectation for measurable improvement in functional capacity or adaption to impairments.    Discharge destination: prior home  Discharge rehabilitation needs: home care      Estimated length of stay: 12 days      Rehabilitation Physician Erasmo Diaz MD

## 2023-04-18 ENCOUNTER — APPOINTMENT (OUTPATIENT)
Dept: OCCUPATIONAL THERAPY | Facility: CLINIC | Age: 53
End: 2023-04-18
Attending: PHYSICAL MEDICINE & REHABILITATION
Payer: COMMERCIAL

## 2023-04-18 ENCOUNTER — APPOINTMENT (OUTPATIENT)
Dept: SPEECH THERAPY | Facility: CLINIC | Age: 53
End: 2023-04-18
Attending: PHYSICAL MEDICINE & REHABILITATION
Payer: COMMERCIAL

## 2023-04-18 ENCOUNTER — APPOINTMENT (OUTPATIENT)
Dept: PHYSICAL THERAPY | Facility: CLINIC | Age: 53
End: 2023-04-18
Attending: PHYSICAL MEDICINE & REHABILITATION
Payer: COMMERCIAL

## 2023-04-18 PROCEDURE — 250N000013 HC RX MED GY IP 250 OP 250 PS 637: Performed by: PHYSICIAN ASSISTANT

## 2023-04-18 PROCEDURE — 99232 SBSQ HOSP IP/OBS MODERATE 35: CPT | Mod: FS | Performed by: PHYSICAL MEDICINE & REHABILITATION

## 2023-04-18 PROCEDURE — 92507 TX SP LANG VOICE COMM INDIV: CPT | Mod: GN

## 2023-04-18 PROCEDURE — 97112 NEUROMUSCULAR REEDUCATION: CPT | Mod: GP

## 2023-04-18 PROCEDURE — 97130 THER IVNTJ EA ADDL 15 MIN: CPT | Mod: GN

## 2023-04-18 PROCEDURE — 97112 NEUROMUSCULAR REEDUCATION: CPT | Mod: GO | Performed by: OCCUPATIONAL THERAPIST

## 2023-04-18 PROCEDURE — 97129 THER IVNTJ 1ST 15 MIN: CPT | Mod: GN

## 2023-04-18 PROCEDURE — 128N000003 HC R&B REHAB

## 2023-04-18 RX ADMIN — ATORVASTATIN CALCIUM 20 MG: 10 TABLET, FILM COATED ORAL at 20:41

## 2023-04-18 RX ADMIN — ACETAMINOPHEN 325MG 650 MG: 325 TABLET ORAL at 07:51

## 2023-04-18 RX ADMIN — AMLODIPINE BESYLATE 10 MG: 10 TABLET ORAL at 07:51

## 2023-04-18 RX ADMIN — LISINOPRIL 20 MG: 20 TABLET ORAL at 07:51

## 2023-04-18 RX ADMIN — SENNOSIDES AND DOCUSATE SODIUM 2 TABLET: 50; 8.6 TABLET ORAL at 20:41

## 2023-04-18 ASSESSMENT — ACTIVITIES OF DAILY LIVING (ADL)
ADLS_ACUITY_SCORE: 43
ADLS_ACUITY_SCORE: 42
ADLS_ACUITY_SCORE: 43
ADLS_ACUITY_SCORE: 42
ADLS_ACUITY_SCORE: 43
ADLS_ACUITY_SCORE: 42
ADLS_ACUITY_SCORE: 43
ADLS_ACUITY_SCORE: 43
ADLS_ACUITY_SCORE: 42
ADLS_ACUITY_SCORE: 42
ADLS_ACUITY_SCORE: 43
ADLS_ACUITY_SCORE: 43

## 2023-04-18 NOTE — PROGRESS NOTES
Discharge Planner Post-Acute Rehab PT:     Discharge Plan: home with / family assistance, home PT    Precautions: falls, alarms, R hemiparesis,  Do not leave on commode/EOB alone, cognitive impairment,  Hmong  needed    Current Status:  Bed Mobility: MOD A with rail  Transfer: Squat pivot MIN A with skilled setup, Pam Gudino with RN staff  Gait: not functional  Stairs: MAX A   Balance:    Ponce: 7/56 on    PASS: 13/36 on     Assessment: Eager to participate, and progressed to low squat pivot transfers with MIN A. Will initiate FES for RLE tomorrow. Anticipate need for w/c for home d/t low Ponce and PASS scores.     Other Barriers to Discharge (DME, Family Training, etc):   Will need AFO, wheelchair and quad cane  Family Training      -------------------------------------------------------------------------------  Initial LE setup: 23    Skilled set up on :  Pt dependent for proper placement of electrodes on R quads, hamstrings, anterior tib, and gastroc for optimum muscle contraction, safe positioning on w/c within frame and cushion for prevention of skin breakdown, feet secured to foot pedals, w/c secured to  w/ Q-straints.  Passive motion assessed to ensure proper positioning.      Pt performed 40 minutes of active FES ergometry with 100% stimulation applied to above muscles at 25 rpm with 2.0 Nm resistance.  This PT adjusted e-stim and cycling parameters in real-time to ensure palpable muscle contractions throughout session.  Please see [email or web address] for further details on patient's stimulation parameters and ergometry outcomes.      Changes in parameters that were part of this treatment:   -resistance increased to keep speed < 40  -pulse width increased to 400us for improved contraction     Functional outcomes from this intervention include:   -reduced spasticity  -improved sensory awareness and proprioception  -improved muscle strength  -improved motor  coordination    Session Summary:   -Average Power: 1.8 W  -Asymmetry: R 6%  -Active Minutes: 38 mins

## 2023-04-18 NOTE — PLAN OF CARE
Discharge Planner Post-Acute Rehab SLP:     Discharge Plan: Home with 24/7 supervision from family, ongoing speech therapy     Precautions: Disoriented, Mod-severe cognitive impairment, Hmong speaking (family interprets), falls risk, R hemiparesis    Current Status:  Hearing: Mild hearing loss per family report  Vision: WFL  Communication: Patient reports word-finding difficulties but overall able to make wants and needs known  Cognition: Moderate-Severe Cognitive impairment  Swallow: Easy to Chew (7) and Thin Liquids (0). Patient and family indicate that patient prefers softer foods. Will monitor if patient presents with any future difficulties.     Assessment: Bedside WAB completed with overall severity rating of mild anomic aphasia. Suspect most the deficiencies were related to cognitive impairments vs language impairments.     Other Barriers to Discharge (Family Training, etc): family training

## 2023-04-18 NOTE — PLAN OF CARE
Goal Outcome Evaluation:      Plan of Care Reviewed With: patient    Overall Patient Progress: no changeOverall Patient Progress: no change    Outcome Evaluation: No new skin issues noted. Did not use call light independently, but is able to make most needs known using family for interpretation and assistance. Reported general body aches, PRN tylenol given.

## 2023-04-18 NOTE — PROGRESS NOTES
Skilled set up on :  Pt dependent for proper placement of electrodes on RUE for optimum muscle contraction, safe positioning on w/c within frame and cushion for prevention of skin breakdown, feet secured to foot pedals, w/c secured to  w/ Q-straints.  Passive motion assessed to ensure proper positioning.       Pt performed 25 minutes of active FES ergometry with 0-100% stimulation applied to above muscles at 25-30 rpm with 0.5-1.2 Nm resistance. This OT adjusted e-stim and cycling parameters in real-time to ensure palpable muscle contractions throughout session.  Please see [email or web address] for further details on patient's stimulation parameters and ergometry outcomes.       Changes in parameters that were part of this treatment:   -resistance  -pulse width, frequency  -amplitude  -pedal speed        Functional outcomes from this intervention include:   -reduced spasticity  -improved sensory awareness and proprioception  -improved muscle strength  -improved motor coordination

## 2023-04-18 NOTE — PLAN OF CARE
Goal Outcome Evaluation:      Plan of Care Reviewed With: patient        Patient seen resting comfortably in bed with eyes closed, no respiratory distress noted. Continent with bowel and bladder, last BM per nursing report 04/17. No new complaints voiced. Patient's family member at bedside. 3 side rails UP, bed alarm ON, call light in reach, safety checks completed, needs attended.    Continue with POC.

## 2023-04-18 NOTE — PLAN OF CARE
VS: T 98.2 HR 81, RR 16, /74   O2: 99% on room air.    Output: Incontinent of urine at times    Last BM: 4/18/23   Activity: Up with assist of 2 via roxane steady.    Skin: Bruising on arms.    Pain: Generalized aches and pains. Tylenol prn    Neuro: Significant right sided weakness   Dressing: None    Diet: Easy to chew regular diet with thin liquids. Dysarthric    LDA: None    Equipment: Wheelchair, roxane steady.   Plan:    Additional Info: Speaks Hmong, Daughter here helping with communication and cares.             Goal Outcome Evaluation: Ongoing

## 2023-04-18 NOTE — PROGRESS NOTES
Discharge Planner Post-Acute Rehab OT:      Discharge Plan: Addendum: Home w/ home OT w/ family and 24/7 A     Precautions: Fall, R mir, do not leave on commode/EOB by herself, cognition  Hmong speaking patient, will need .     Current Status:  ADLs:    Mobility: Bed mobility min A. EOB sitting CGA, min A SPT toward pt's L side. Nsg roxane stedy x2.    Grooming: Min A    Dressing: UB mod A, LB mod A brief and max A socks.  Pt damp after shower so anticipate progress during next session.     Bathing: Pt used the rolling shower chair but did SPT toward her L Min A. Pt bathed 5/10 body parts seated.     Toileting: Nsg roxane ramirez x2.  IADLs: TBD  Vision/Cognition: Vision appears functional, screen as appropriate. Pt is disoriented, impaired memory, impaired attention, impaired speech/communication, aphasia possibly and variably impulsive- responds well to safety cues.     Assessment: Utilized FES for RUE neuro re-education and sensorimotor skills; refer to care plan note for further details.    Other Barriers to Discharge (DME, Family Training, etc):   Pt has a standard height toilet, walk in shower or tub/shower combo.  She is used to using the walk in shower and does not have any AE in the bathroom.   DME: TBD  Family training: Daughter/family will be present and sleeping over night at times. Continue ongoing family training as appropriate.

## 2023-04-18 NOTE — PROGRESS NOTES
"  [facility]   Acute Rehabilitation Unit  Daily progress note    INTERVAL HISTORY  Mercy Osman was seen and examined at bedside this afternoon with daughter present, interpreting assisting by phone.  No acute events reported overnight.  She reports that she is feeling ok overall, noting some slow progress with therapies.  She notes some generalized \"body aches\".  Has difficulty further describing, but later able to identify this is primarily on the right side of her body and aggravated by using that side.  Has been using Tylenol intermittently.  Denies other concerns or questions at this time.    Functionally, she is needing min A for bed mobility, min A for pivot transfers with therapies, using roxane stedy with nursing.  She needs min A for grooming, mod A for upper body dressing, max A for socks.    MEDICATIONS    amLODIPine  10 mg Oral Daily     atorvastatin  20 mg Oral QPM     lisinopril  20 mg Oral Daily     polyethylene glycol  17 g Oral Daily     senna-docusate  2 tablet Oral At Bedtime        acetaminophen, hydrALAZINE, melatonin     PHYSICAL EXAM  /76 (BP Location: Left arm, Patient Position: Semi-[place]ginbotham's, Cuff Size: Adult Regular)   Pulse 79   Temp 98.4  F (36.9  C) (Oral)   Resp 18   Ht 1.473 m (4' 10\")   Wt 50.5 kg (111 lb 5.3 oz)   SpO2 96%   BMI 23.27 kg/m     Gen: NAD, seated upright in chair  HEENT: NC/AT, MMM  Cardio: RRR, no murmurs  Pulm: non-labored on room air, lungs CTA bilaterally  Abd: soft, non-tender, non-distended, bowel sounds present  Ext: slight edema in right hand, glove in place  Neuro/MSK: awake, alert, PERRL, left upper facial weakness, right lower facial droop, right hemiparesis: RUE SF 1/5, EF 2/5, EE 1/5, WE 0/5, FF 0/5; right hemisensory impairment    LABS  CBC RESULTS:   Recent Labs   Lab Test 04/17/23  0945 04/14/23  0803 04/13/23  0813   WBC 8.1 7.5 8.7   RBC 5.12 5.44* 5.32*   HGB 12.9 13.7 13.4   HCT 42.6 45.1 44.0   MCV 83 " 83 83   MCH 25.2* 25.2* 25.2*   MCHC 30.3* 30.4* 30.5*   RDW 16.5* 16.7* 16.9*    340 345     Last Basic Metabolic Panel:  Recent Labs   Lab Test 04/17/23  0945 04/14/23  0828 04/14/23  0803 04/13/23  0842 04/13/23  0813     --  139  --  139   POTASSIUM 3.5  --  4.0  --  4.0  4.0   CHLORIDE 104  --  106  --  105   CO2 23  --  20*  --  19*   ANIONGAP 12  --  13  --  15   * 97 107*   < > 99   BUN 12.4  --  12.4  --  13.3   CR 0.71  --  0.76  --  0.72   GFRESTIMATED >90  --  >90  --  >90   SALAZAR 10.0  --  9.6  --  9.6    < > = values in this interval not displayed.       Rehabilitation - continue comprehensive acute inpatient rehabilitation program with multidisciplinary approach including therapies, rehab nursing, and physiatry following. See interval history for updates.      ASSESSMENT AND PLAN  Mercy Osman is a 52 year old right hand dominant female with a past medical history of hypertension, [place]' palsy who was admitted on 4/8/23 with spontaneous acute left thalamic intracranial hemorrhage with hospital course complicated by hyperlipidemia, prediabetes, elevated troponin, anemia, hypokalemia, and loose stools.  She is now admitted to ARU on 4/14/23 for multidisciplinary rehabilitation and ongoing medical management.        Admission to acute inpatient rehab 4/14/23.    Impairment group code: Stroke Vascular Hemorrhagic 01.2 (R) Body Involvement (L) Brain -  left basal ganglia hemorrhage        1. PT, OT and SLP 60 minutes of each on a daily basis, in addition to rehab nursing and close management of physiatrist.       2. Impairment of ADL's: Noted to have impaired activity tolerance, impaired balance, impaired coordination, impaired ROM, impaired sensation, impaired strength w/ R hemiparesis, impaired motor control, fatigue, dizziness, and impaired weight shifting, all affecting her ability to safely and independently perform basic ADLs.  Goal for min A or less with basic  ADLs.     3. Impairment of mobility:  Noted to have impaired activity tolerance, impaired balance, impaired coordination, impaired ROM, impaired sensation, impaired strength w/ R hemiparesis, impaired motor control, fatigue, dizziness, and impaired weight shifting, all affecting her ability to safely and independently perform basic mobility.  Goal for min A or less with basic mobility.     4. Impairment of cognition/language/swallow:  Noted to have dysarthria and dysphagia, and would benefit from full cognitive-linguistic evaluation at ARU with goals for improved cognitive-linguistic skills to meet basic needs and safe tolerance of least restrictive diet.     5. Medical Conditions  New actions/orders/updates for today are in blue.     Left basal ganglia intraparenchymal hemorrhage, spontaneous, likely secondary to uncontrolled hypertension  Vasogenic cerebral edema  Deficits: right hemiparesis, right facial droop, right hemisensory deficit, right visual neglect, dysarthria, ?aphasia, dysphagia, suspect impaired cognition  - Long-term BP goal 140/90 or less, management as below  - Stroke PLC completed 4/10 (during inpatient stay)  - Noting some generalized right-sided pain but unable to further describe.  ?central.  Continue PRN Tylenol and added PRN voltaren.  - Continue PT/OT/SLP  - Follow up with neurology 6-8 weeks after your discharge      Hypertension  PTA on amlodipine-benazepril 10-20 mg capsule.  BP elevated to 220s/100s at presentation.  Initially managed on nicardipine drip, then transitioned to PO lisinopril + amlodipine.  - Long-term BP goal 140/90 or less  - Continue Lisinopril 20mg daily  - Continue amlodipine 10mg daily  - PRN hydralazine for SBP >160  - Monitor BP, adjust regimen as indicated     Hyperlipidemia  Started on statin therapy this admission with .  - Continue atorvastatin 20 mg daily     Troponin elevation, asymptomatic, spontaneously resolved  EKG without obvious acute ischemic  "changes.  Echo WNL.  No cardiogenic symptoms, suspect transient stress related troponin leak.  Peaked to 113 on 4/10.  Troponin improved spontaneously without intervention.  - Further work-up if any cardiogenic symptoms develop     Hypokalemia, resolved s/p replacement  Hyperchloremia, resolved with adequate hydration   - Trend BMP every Monday.  : K WNL 3.5, Cl   - Replete as needed per protocol     Pre-diabetes   Hgb A1c 6.2% 2023   - Monitor with surveillance with primary care outpatient  - No routine BG checks indicated, monitor periodically with BMP     Anemia, chronic   Hgb 11.5 on presentation.  WNL at 13.4 as of .  - Hgb goal >7, plt goal >50k  - Trend CBC every Monday.  : Hgb WNL 12.9    Major Depressive Disorder, recurrent episode, moderate  SW intake with PHQ-9 score 18, did indicate some passive suicidal ideation.  Per family, have noted some symptoms of depression since patient's son  of suicide ~3 years ago.  - Psychiatry consulted over weekend, appreciate assistance.  Met with patient today.  Discussed potential medication trial, but patient would like to \"think about it\".  If agreeable, psychiatry recommending Remeron 7.5 mg at HS.  - Also offered health psychology supports.  Again patient notes she would like to think about it for a few days and let us know.  Will plan to revisit.       6. Adjustment to disability:  Clinical psychology to eval and treat if indicated  7. FEN: easy to chew (level 7) diet, thin liquids  8. Bowel: continent, recent loose stools, monitor, PRN and scheduled bowel meds available  9. Bladder: continent/incontinent, monitor PVRs at admission  10. DVT Prophylaxis: mechanical  11. GI Prophylaxis: not indicated  12. Code: full  13. Disposition: goal for home with family support  14. ELOS:  target 23  15. Follow up Appointments on Discharge: PCP in 1-2 weeks, neurology in 6-8 weeks after discharge        Patient was discussed with Dr. Zimmerman " Emily, PM&R Staff Physician    CARIN Louise-C  Physical Medicine & Rehabilitation

## 2023-04-19 ENCOUNTER — APPOINTMENT (OUTPATIENT)
Dept: SPEECH THERAPY | Facility: CLINIC | Age: 53
End: 2023-04-19
Attending: PHYSICAL MEDICINE & REHABILITATION
Payer: COMMERCIAL

## 2023-04-19 ENCOUNTER — APPOINTMENT (OUTPATIENT)
Dept: OCCUPATIONAL THERAPY | Facility: CLINIC | Age: 53
End: 2023-04-19
Attending: PHYSICAL MEDICINE & REHABILITATION
Payer: COMMERCIAL

## 2023-04-19 ENCOUNTER — APPOINTMENT (OUTPATIENT)
Dept: PHYSICAL THERAPY | Facility: CLINIC | Age: 53
End: 2023-04-19
Attending: PHYSICAL MEDICINE & REHABILITATION
Payer: COMMERCIAL

## 2023-04-19 ENCOUNTER — PATIENT OUTREACH (OUTPATIENT)
Dept: CARE COORDINATION | Facility: CLINIC | Age: 53
End: 2023-04-19
Payer: COMMERCIAL

## 2023-04-19 PROCEDURE — 97112 NEUROMUSCULAR REEDUCATION: CPT | Mod: GO | Performed by: OCCUPATIONAL THERAPIST

## 2023-04-19 PROCEDURE — 92507 TX SP LANG VOICE COMM INDIV: CPT | Mod: GN

## 2023-04-19 PROCEDURE — 97112 NEUROMUSCULAR REEDUCATION: CPT | Mod: GP | Performed by: PHYSICAL THERAPIST

## 2023-04-19 PROCEDURE — 250N000013 HC RX MED GY IP 250 OP 250 PS 637: Performed by: PHYSICIAN ASSISTANT

## 2023-04-19 PROCEDURE — 999N000125 HC STATISTIC PATIENT MED CONFERENCE < 30 MIN: Performed by: OCCUPATIONAL THERAPIST

## 2023-04-19 PROCEDURE — 99232 SBSQ HOSP IP/OBS MODERATE 35: CPT | Mod: FS | Performed by: PHYSICAL MEDICINE & REHABILITATION

## 2023-04-19 PROCEDURE — 999N000125 HC STATISTIC PATIENT MED CONFERENCE < 30 MIN

## 2023-04-19 PROCEDURE — 999N000150 HC STATISTIC PT MED CONFERENCE < 30 MIN

## 2023-04-19 PROCEDURE — 97535 SELF CARE MNGMENT TRAINING: CPT | Mod: GO | Performed by: OCCUPATIONAL THERAPIST

## 2023-04-19 PROCEDURE — 128N000003 HC R&B REHAB

## 2023-04-19 RX ADMIN — LISINOPRIL 20 MG: 20 TABLET ORAL at 08:27

## 2023-04-19 RX ADMIN — ATORVASTATIN CALCIUM 20 MG: 10 TABLET, FILM COATED ORAL at 20:18

## 2023-04-19 RX ADMIN — SENNOSIDES AND DOCUSATE SODIUM 2 TABLET: 50; 8.6 TABLET ORAL at 20:18

## 2023-04-19 ASSESSMENT — ACTIVITIES OF DAILY LIVING (ADL)
ADLS_ACUITY_SCORE: 40
ADLS_ACUITY_SCORE: 40
ADLS_ACUITY_SCORE: 41
ADLS_ACUITY_SCORE: 40
ADLS_ACUITY_SCORE: 40
ADLS_ACUITY_SCORE: 41
ADLS_ACUITY_SCORE: 40
ADLS_ACUITY_SCORE: 41
ADLS_ACUITY_SCORE: 43

## 2023-04-19 NOTE — PLAN OF CARE
Goal Outcome Evaluation:      Plan of Care Reviewed With: patient, family    Overall Patient Progress: no change    Outcome Evaluation: No change in patient progress this shift.    Orientation: A/Ox4. Hmong speaking. Interpreting provided via daughter in room per patient preference   Bowel: No BM on this shift  Bladder: Mixed continence of bladder  Pain: Denies pain  Ambulation/Transfers: Ax2 with Pam Gudino for transfers, WC based  Diet/ Liquids: Tolerating diet well with good appetite  Bed and chair alarms on for safety, call light within reach. Continue with POC.

## 2023-04-19 NOTE — PLAN OF CARE
FOCUS/GOAL  Bowel management, Bladder management, Mobility, Skin integrity, and Safety management    ASSESSMENT, INTERVENTIONS AND CONTINUING PLAN FOR GOAL:  Patient is alert and oriented x 3. Patient is Hmong speaking, daughter present in room. Is assist of x 2 with andra. Patient was continent of bladder on this shift. No BM on shift. No skin issues. Patient denies pain. Call light is within reach, and alarm is on. Nursing staff will continue with.  Goal Outcome Evaluation:

## 2023-04-19 NOTE — PLAN OF CARE
Discharge Planner Post-Acute Rehab SLP:     Discharge Plan: Home with 24/7 supervision from family, ongoing speech therapy     Precautions: Disoriented, Mod-severe cognitive impairment, Hmong speaking (family interprets), falls risk, R hemiparesis    Current Status:  Hearing: Mild hearing loss per family report  Vision: WFL  Communication: Patient reports word-finding difficulties but overall able to make wants and needs known  Cognition: Moderate-Severe Cognitive impairment  Swallow: Easy to Chew (7) and Thin Liquids (0). Patient and family indicate that patient prefers softer foods. Will monitor if patient presents with any future difficulties.     Assessment: Patient engaged in task requiring her to name [employer] kit items as well as discussion regarding each of the objects.  Patient able to consistently name the objects.  Requiring mild cues to answer questions related to the objects such as what object is for, where to purchase, etc.    Other Barriers to Discharge (Family Training, etc): family training

## 2023-04-19 NOTE — PROGRESS NOTES
"  [facility]   Acute Rehabilitation Unit  Daily progress note    INTERVAL HISTORY  Mercy Osman was seen and examined at bedside this morning during team rounds with daughter present,  assisting by phone.  No acute events reported overnight.  Patient reports to be feeling well overall, denies new concerns or questions.  OT noting significant R shoulder subluxation.  Patient does not specify pain in this location, though has complained of some general \"body aches\" on right with difficulty further describing.  OT noting improvement in right hand swelling with good positioning of right arm in bed and wheelchair with arm tray, no longer requiring isotoner glove.  Will continue to assess mental health and offer pharmacologic and non-pharmacologic strategies for depression/coping.    Functionally, she is able to complete upper body dressing with min A, lower body dressing with mod A.  Was able to follow 1-handed dressing techniques with OT.  Ponce balance scale and PASS scores indicative of wheelchair based vs mixed mobility at best at discharge.  She is needing max A for gait and stairs.  Transfers are improving.  SLP noting some higher-level word finding difficulties as well as cognitive deficits.  For full functional updates, see team rounds note from today.    MEDICATIONS    amLODIPine  10 mg Oral Daily     atorvastatin  20 mg Oral QPM     lisinopril  20 mg Oral Daily     polyethylene glycol  17 g Oral Daily     senna-docusate  2 tablet Oral At Bedtime        acetaminophen, diclofenac, hydrALAZINE, melatonin     PHYSICAL EXAM  /67 (BP Location: Left arm)   Pulse 99   Temp 98.4  F (36.9  C) (Oral)   Resp 14   Ht 1.473 m (4' 10\")   Wt 50.5 kg (111 lb 5.3 oz)   SpO2 96%   BMI 23.27 kg/m     Gen: NAD, lying in bed  Cardio: appears well-perfused  Pulm: non-labored on room air  Abd: soft, non-tender, non-distended  Ext: no appreciable edema in bilat lower " extremities, improved right hand edema today  Neuro/MSK: awake, alert, PERRL, left upper facial weakness, right lower facial droop, right hemiparesis, right hemisensory impairment  *Full exam deferred today for conversation    LABS  CBC RESULTS:   Recent Labs   Lab Test 04/17/23  0945 04/14/23  0803 04/13/23  0813   WBC 8.1 7.5 8.7   RBC 5.12 5.44* 5.32*   HGB 12.9 13.7 13.4   HCT 42.6 45.1 44.0   MCV 83 83 83   MCH 25.2* 25.2* 25.2*   MCHC 30.3* 30.4* 30.5*   RDW 16.5* 16.7* 16.9*    340 345     Last Basic Metabolic Panel:  Recent Labs   Lab Test 04/17/23  0945 04/14/23  0828 04/14/23  0803 04/13/23  0842 04/13/23  0813     --  139  --  139   POTASSIUM 3.5  --  4.0  --  4.0  4.0   CHLORIDE 104  --  106  --  105   CO2 23  --  20*  --  19*   ANIONGAP 12  --  13  --  15   * 97 107*   < > 99   BUN 12.4  --  12.4  --  13.3   CR 0.71  --  0.76  --  0.72   GFRESTIMATED >90  --  >90  --  >90   SALAZAR 10.0  --  9.6  --  9.6    < > = values in this interval not displayed.       Rehabilitation - continue comprehensive acute inpatient rehabilitation program with multidisciplinary approach including therapies, rehab nursing, and physiatry following. See interval history for updates.      ASSESSMENT AND PLAN  Mercy Osman is a 52 year old right hand dominant female with a past medical history of hypertension, [place]' palsy who was admitted on 4/8/23 with spontaneous acute left thalamic intracranial hemorrhage with hospital course complicated by hyperlipidemia, prediabetes, elevated troponin, anemia, hypokalemia, and loose stools.  She is now admitted to ARU on 4/14/23 for multidisciplinary rehabilitation and ongoing medical management.        Admission to acute inpatient rehab 4/14/23.    Impairment group code: Stroke Vascular Hemorrhagic 01.2 (R) Body Involvement (L) Brain -  left basal ganglia hemorrhage        1. PT, OT and SLP 60 minutes of each on a daily basis, in addition to rehab nursing and close  management of physiatrist.       2. Impairment of ADL's: Noted to have impaired activity tolerance, impaired balance, impaired coordination, impaired ROM, impaired sensation, impaired strength w/ R hemiparesis, impaired motor control, fatigue, dizziness, and impaired weight shifting, all affecting her ability to safely and independently perform basic ADLs.  Goal for min A or less with basic ADLs.     3. Impairment of mobility:  Noted to have impaired activity tolerance, impaired balance, impaired coordination, impaired ROM, impaired sensation, impaired strength w/ R hemiparesis, impaired motor control, fatigue, dizziness, and impaired weight shifting, all affecting her ability to safely and independently perform basic mobility.  Goal for min A or less with basic mobility.     4. Impairment of cognition/language/swallow:  Noted to have dysarthria and dysphagia, and would benefit from full cognitive-linguistic evaluation at ARU with goals for improved cognitive-linguistic skills to meet basic needs and safe tolerance of least restrictive diet.     5. Medical Conditions  New actions/orders/updates for today are in blue.     Left basal ganglia intraparenchymal hemorrhage, spontaneous, likely secondary to uncontrolled hypertension  Vasogenic cerebral edema  Deficits: right hemiparesis, right facial droop, right hemisensory deficit, right visual neglect, dysarthria, ?aphasia, dysphagia, suspect impaired cognition  - Long-term BP goal 140/90 or less, management as below  - Stroke PLC completed 4/10 (during inpatient stay)  - 4/18 noting some generalized right-sided pain but unable to further describe.  ?central.  Also with significant R shoulder subluxation. Continue PRN Tylenol and added PRN voltaren.  - Continue PT/OT/SLP  - Follow up with neurology 6-8 weeks after your discharge      Hypertension  PTA on amlodipine-benazepril 10-20 mg capsule.  BP elevated to 220s/100s at presentation.  Initially managed on nicardipine  "drip, then transitioned to PO lisinopril + amlodipine.  - Long-term BP goal 140/90 or less  - Continue Lisinopril 20mg daily  - Continue amlodipine 10mg daily  - PRN hydralazine for SBP >160  - Monitor BP, adjust regimen as indicated     Hyperlipidemia  Started on statin therapy this admission with .  - Continue atorvastatin 20 mg daily     Troponin elevation, asymptomatic, spontaneously resolved  EKG without obvious acute ischemic changes.  Echo WNL.  No cardiogenic symptoms, suspect transient stress related troponin leak.  Peaked to 113 on 4/10.  Troponin improved spontaneously without intervention.  - Further work-up if any cardiogenic symptoms develop     Hypokalemia, resolved s/p replacement  Hyperchloremia, resolved with adequate hydration   - Trend BMP every Monday.  : K WNL 3.5, Cl   - Replete as needed per protocol     Pre-diabetes   Hgb A1c 6.2% 2023   - Monitor with surveillance with primary care outpatient  - No routine BG checks indicated, monitor periodically with BMP     Anemia, chronic   Hgb 11.5 on presentation.  WNL at 13.4 as of .  - Hgb goal >7, plt goal >50k  - Trend CBC every Monday.  : Hgb WNL 12.9    Major Depressive Disorder, recurrent episode, moderate  SW intake with PHQ-9 score 18, did indicate some passive suicidal ideation.  Per family, have noted some symptoms of depression since patient's son  of suicide ~3 years ago.  - Psychiatry consulted over weekend, appreciate assistance.  Met with patient today.  Discussed potential medication trial, but patient would like to \"think about it\".  If agreeable, psychiatry recommending Remeron 7.5 mg at HS.  - Also offered health psychology supports.  Again patient notes she would like to think about it for a few days and let us know.  Will plan to revisit.       6. Adjustment to disability:  Clinical psychology to eval and treat if indicated  7. FEN: easy to chew (level 7) diet, thin liquids  8. Bowel: " continent/incontinent, monitor, PRN and scheduled bowel meds available  9. Bladder: continent/incontinent  10. DVT Prophylaxis: mechanical  11. GI Prophylaxis: not indicated  12. Code: full  13. Disposition: goal for home with family support  14. ELOS:  target 5/5/23  15. Follow up Appointments on Discharge: PCP in 1-2 weeks, neurology in 6-8 weeks after discharge        Patient was seen and discussed with Dr. Erasmo Diaz, PM&R Staff Physician    Fabi Virgen PA-C  Physical Medicine & Rehabilitation

## 2023-04-19 NOTE — PLAN OF CARE
Discharge Planner Post-Acute Rehab PT:      Discharge Plan: home with  family assistance, home PT     Precautions: falls, alarms, R hemiparesis,  Do not leave on commode/EOB alone, cognitive impairment,  Hmong  needed     Current Status:  Bed Mobility: Min A to right on mat no rail >> key contact at upper shoulder.  Transfer: Stand pivot to pt's left; MIN A for stability and facilitated wt shifting and skilled setup, Pam Gudino with RN staff  Gait: not functional  Stairs: MAX A   Balance:               Ponce: 7/56 on               PASS: 13/36 on      Assessment: Eager to participate, and progressed to low squat pivot transfers with MIN A. Will initiate FES for RLE tomorrow. Anticipate need for w/c for home d/t low Ponce and PASS scores.      Other Barriers to Discharge (DME, Family Training, etc):   Will need AFO, wheelchair and quad cane  Family Training        -------------------------------------------------------------------------------  Initial LE setup: 23     Skilled set up on :  Pt dependent for proper placement of electrodes on R quads, hamstrings, anterior tib, and gastroc for optimum muscle contraction, safe positioning on w/c within frame and cushion for prevention of skin breakdown, feet secured to foot pedals, w/c secured to  w/ Q-straints.  Passive motion assessed to ensure proper positioning.       Pt performed 40 minutes of active FES ergometry with 100% stimulation applied to above muscles at 25 rpm with 2.0 Nm resistance.  This PT adjusted e-stim and cycling parameters in real-time to ensure palpable muscle contractions throughout session.  Please see [email or web address] for further details on patient's stimulation parameters and ergometry outcomes.       Changes in parameters that were part of this treatment:   -resistance increased to keep speed < 40  -pulse width increased to 400us for improved contraction      Functional outcomes from this intervention include:    -reduced spasticity  -improved sensory awareness and proprioception  -improved muscle strength  -improved motor coordination     Session Summary:   -Average Power: 1.8 W  -Asymmetry: R 6%  -Active Minutes: 38 mins

## 2023-04-19 NOTE — PROGRESS NOTES
Stroke RN Care Coordination - Initial Outreach Note     SITUATION     Mercy Osman is a 52 year old female who is receiving support for:  Clinic Care Coordination - Initial (Stroke Care Coordination)      BACKGROUND     Ms. Mercy Osman is a 52 year old woman with a past medical history of HTN (on PTA amlodipine-benazepril 10-20), history of prior Bell's Palsy admitted on 04/08/23 as a transfer from [facility] for left intracranial hemorrhage.     Per chart review she presented 4/7/23 to [facility] ED via EMS after noted right sided weakness and facial droop. Last known well was 4/7 at 23:47. Per EHR review Ms. Osman was walking downstairs and fell due to sudden onset of right arm and leg weakness. She did not have obvious head trauma and she is not currently on anticoagulation. Stroke code activated upon arrival (see fellow note for full details). She was found to have a left basal ganglia hemorrhage. BP on arrival  To /115. Noted initially to be hypokalemic to 2.6, repleted. Started on a nicardipine gtt which has been off since 4/8.  Currently tolerating lisinopril and amlodipine (reintroduced 4/11) for pressure management.  There have been moderate improvements in RLE, but the RUE remains plegic.  She was deemed a good candidate for acute rehabilitation and is discharging in stable condition on 4/13/23    ASSESSMENT     Brief Hospital Course:   Patient presented with Right sided weakness       Found to have left IPH     IV thrombolysis was not administered due to hemorrhage       Work-up as stated below under Pertinent Investigations.     Etiology is thought to be hypertensive.       Rehab evaluation: OT, PT and SLP.      Smoking Cessation: patient is not a smoker     BP Long-term Goal: 140/90 or less, administer PRN antihypertensives for BP > 160 mmHg systolic     Antithrombotic/Anticoagulant Agent: not ordered due to contraindication hemorrhage     Statins: Started on atorvastatin 20mg daily         Hgb A1C Goal: < 7.0     Complications: None.      Other problems addressed during this hospitalization:     #HTN  - Cardiac monitoring during hospitalization, okay to discontinue at discharge  - SBP goal < 160 mmHg   - Hold PTA amlodipine-benazepril, discuss with primary care regarding re-initiation  - Lisinopril 20mg daily  - continue amlodipine 10mg daily     #Troponin elevation, asymptomatic, spontaneously resolved  EKG wnl.  No cardiogenic symptoms, suspect transient stress related troponin leak. No intervention performed. Our plan was to intervene with repeat EKG and further work up if the pt expressed cardiogenic symptoms, which she did not.  Troponin improved spontaneously without intervention.  - Trend 32 - 40 - 45 - 50 - 51 - 84 - 100 - 113 (peak) - 111 - 95 - 88  - EKG without obvious acute ischemic changes   - Echocardiogram WNL     #Hypokalemia, resolved, received replacement  #Hyperchloridemia, resolved with adequate hydration   - Daily BMP  - IV fluids: Plasmalyte 50 ml/hour, discontinued on 4/12  - Electrolyte replacement protocol was ordered during hospitalization     #Pre-diabetes   - Hgb A1c 6.2% 4/8/2023   - TSH 3.45   - Monitor with surveillance with primary care outpatient     #Anemia, chronic   - Daily CBC done in hospital  - Hgb goal >7, plt goal >50k  - No transfusions needed during hospitalization     PERTINENT INVESTIGATIONS     Labs      Lipid Panel: Recent Labs   Lab Test 04/08/23  0842   *      A1C:         Lab Results   Component Value Date     A1C 6.2 04/08/2023      INR:       Recent Labs   Lab 04/08/23  0032   INR 0.99      Coag Panel / Hypercoag Workup: Not indicated  Pending test results: None     MRI/Head CT  CT head 4/8/2023 12:31 AM-  1. 2.7 x 1.4 x 2.5 cm acute hemorrhage in the left thalamus/left internal capsule with involvement of the left basal ganglia. No intraventricular extension. This is typical of a hypertensive bleed.  2. Slight left to right bowing of the  midline structures.   CT head 4/8/2023 9:48 AM-  Stable intraparenchymal hemorrhage centered at the left  thalamus with associated mass effect upon the left lateral ventricle.  No new intracranial hemorrhage or other acute intracranial findings.  CT head 4/28/2023 9:09 PM-  Unchanged intraparenchymal hemorrhage centered in the left thalamus with associated mass effect. No new hemorrhage or acute intracranial finding.   Intracranial Vasculature HEAD CTA:  ANTERIOR CIRCULATION: Scattered atheromatous disease. No stenosis/occlusion, aneurysm, or high flow vascular malformation. Fetal origin of the right posterior cerebral artery from the anterior circulation.     POSTERIOR CIRCULATION: No stenosis/occlusion, aneurysm, or high flow vascular malformation. Balanced vertebral arteries supply a normal basilar artery.      DURAL VENOUS SINUSES: Not well evaluated on a technical basis   Cervical Vasculature NECK CTA:  RIGHT CAROTID: No measurable stenosis or dissection.     LEFT CAROTID: No measurable stenosis or dissection.     VERTEBRAL ARTERIES: No focal stenosis or dissection. Balanced vertebral arteries.     AORTIC ARCH: Classic aortic arch anatomy with no significant stenosis at the origin of the great vess      Echocardiogram Left ventricular function is decreased. The ejection fraction is 50-55%  (borderline). Mild to moderate concentric wall thickening consistent with left  ventricular hypertrophy is present.  Global right ventricular function is normal.  No significant valvular abnormalities present.  Estimated mean right atrial pressure is normal.  Ascending aorta dilated at 4.4 cm.  Trivial pericardial effusion is present.   EKG/Telemetry Sinus rhythm with sinus arrhythmia   Voltage criteria for left ventricular hypertrophy ( R in aVL , Sokolow-Jackson , Jose product )   ST & T wave abnormality, consider lateral ischemia   Prolonged QT   Abnormal ECG    Other Testing Not Applicable       LDL  4/8/2023: 142 mg/dL    A1C  4/8/2023: 6.2 %   Troponin 4/11/2023: 88 ng/L         Endovascular procedure: None      Cardiac Monitoring: Patient had > 24 hrs of cardiac monitor while in hospital.    Findings: No atrial fibrillation was found.      Pt specific risk factors for stroke or TIA (transient ischemic attack):    Your Risk Factors Your Results Normal Ranges   High blood pressure  Less than 120/80   Cholesterol Total No results found for: CHOL   Less than 150    Triglycerides No results found for: TRIG Less than 150   LDL Lab Results   Component Value Date     04/08/2023       Less than 70   HDL No results found for: HDL      Greater than 40 (men)  Greater than 50 (women)   Diabetes Recent Labs   Lab Test 04/08/23  0842   A1C 6.2*    Less than 7   Smoking/tobacco use  Quit smoking and tobacco   Overweight  Lose 1-2 pounds a week   Lack of exercise  30 minutes moderate activity each day     Other risk factors include carotid (neck) artery disease, atrial fibrillation and stress. You may be on new medicine to treat high blood pressure, cholesterol, diabetes or atrial fibrillation.    Stroke warning signs and symptoms - CALL 911 right away for:  - Sudden numbness or weakness in the face, arm or leg (often on one side of the body).  - Sudden confusion or trouble understanding what is going on.  - Sudden blurred or decreased vision in one or both eyes.  - Sudden trouble speaking, loss of balance, dizziness or problems with coordination.  - Sudden, severe headache for no reason.  - Fainting or seizures.  - Symptoms may go away then come back suddenly.    PLAN         Follow-up plan:  Patient currently at [facility].  Will notify SW/discharge planner that Stroke Care Coordinator plans to follow patient and reach out upon discharge    Allyn Garcia RN    Stroke Care Coordinator

## 2023-04-19 NOTE — PROGRESS NOTES
"Discharge Planner Post-Acute Rehab OT:      Discharge Plan: Addendum: Home w/ home OT w/ family and 24/7 A     Precautions: Fall, R mir, do not leave on commode/EOB by herself, cognition  Hmong speaking patient, will need .    Bedside Nursing to perform the following precautions for RUE shoulder subluxation:      No twisting, pulling, or lifting UE above 90 degrees of flexion or \"shoulder height\".    Support UE at all times to prevent further subluxation.    Position UE on arm tray when seated in wheelchair and on pillows when seated or lying in bed. Position UE in a neutral position.    Avoid using hemiparetic UE for BP readings and IV/PICC placement when medically appropriate.    Current Status:  ADLs:    Mobility: W/C based. min A SPT toward pt's L side, mod A to the right. Nsg roxane stedy x2.    Grooming: Set-up and SBA seated.     Dressing: UB min A, mod A with LBD tasks and footwear.    Bathing: Pt used the rolling shower chair but did SPT toward her L Min A. Pt bathed 5/10 body parts seated.     Toileting: Nsg roxane stedy x2. Min-mod A with W/C<>toilet squat pivot transfer with therapy with blocking of RLE.   IADLs: TBD. Supportive family  Vision/Cognition: Vision appears functional, screen as appropriate. Pt is disoriented, impaired memory, impaired attention, impaired speech/communication, aphasia possibly and variably impulsive- responds well to safety cues.     Assessment: Completed partial ADL routine with focus on one handed dressing techniques and progressing toileting with squat pivot transfers from W/C. Recommending roxane steady with nsg still d/t RLE weakness and need for blocking for transfer. Educated pt and daughter on NMES shoulder subluxation protocol which daughter will complete 6x a day.      Other Barriers to Discharge (DME, Family Training, etc):   Pt has a standard height toilet, walk in shower or tub/shower combo.  She is used to using the walk in shower and does not have any AE " in the bathroom.   DME: TBD  Family training: Daughter/family will be present and sleeping over night at times. Continue ongoing family training as appropriate.

## 2023-04-19 NOTE — PROGRESS NOTES
CLINICAL NUTRITION SERVICES - REASSESSMENT NOTE     Nutrition Prescription    RECOMMENDATIONS FOR MDs/PROVIDERS TO ORDER:  None at this time.    Malnutrition Status:    Patient does not meet two of the established criteria necessary for diagnosing malnutrition but is at risk for malnutrition    Recommendations already ordered by Registered Dietitian (RD):  - Ensure Enlive (vanilla) TID between meals  - Additional snacks/supplements PRN    Future/Additional Recommendations:  Monitor PO intake, supplement use, and weight trends     EVALUATION OF THE PROGRESS TOWARD GOALS   Diet: Level 7: Easy to Chew Dysphagia Diet   Snacks/supplements: Ensure Enlive (vanilla) at 10 am and 2 pm    Intake: % per flowsheets since admission.    Per [employer], pt ordering 1-3 meals/day from room service. Ordered 3-day average of 1800 kcal and 64 g protein. This meets >100% of estimated energy and protein needs.     NEW FINDINGS   Per SLP note , pt and family indicated that pt prefers softer foods.    Met with pt and family in room. Family interpreted for pt. Pt reports continued decreased appetite but thinks it is improving. She has been drinking the 2 Ensure/day and feels like it has been very helpful. She is interested in having 3 Ensure per day. Encouraged adequate oral intake and food from home/outside hospital. Pt had some food and drinks from home in the room.    Weight:   Wt Readings from Last 10 Encounters:   23 50.5 kg (111 lb 5.3 oz)   23 52.6 kg (115 lb 15.4 oz)   23 47.6 kg (105 lb)     Care Everywhere:  22: 114.2 lbs  10/26/18: 47.6 kg (105 lb)    4% wt loss in less than 1 week    Labs:   Phos: 5.1 (H, )  Hemoglobin A1C: 6.2 ()  B-186 over 24 hours    Meds:  Senna-docusate    GI: last BM 4/18 x2 per I/Os    MALNUTRITION  % Intake: Decreased intake does not meet criteria  % Weight Loss: > 2% in 1 week (severe)  Subcutaneous Fat Loss: None observed  Muscle Loss: None  observed  Fluid Accumulation/Edema: None noted  Malnutrition Diagnosis: Patient does not meet two of the established criteria necessary for diagnosing malnutrition but is at risk for malnutrition    Previous Goals   Patient to consume % of nutritionally adequate meal trays TID, or the equivalent with supplements/snacks.  Evaluation: Met  Patient will maintain weight throughout admission  Evaluation: Not met    Previous Nutrition Diagnosis  Unintended weightloss related to inadequate oral intake and dysphagia as evidenced by weight loss of 3.5% within one week.     Evaluation: No change    CURRENT NUTRITION DIAGNOSIS  Unintended weight loss related to decreased oral intake and dysphagia as evidenced by weight loss of 3.5% within one week.       INTERVENTIONS  Implementation  Collaboration with other providers - discussed in team rounds  Medical food supplement therapy - Ensure TID between meals, additional PRN    Goals  Patient to consume % of nutritionally adequate meal trays TID, or the equivalent with supplements/snacks.    Monitoring/Evaluation  Progress toward goals will be monitored and evaluated per protocol.    Carrie Aceves RD, EMILIA  [facility] RD pager: [phone]  Weekend/Holiday RD pager: [phone]

## 2023-04-19 NOTE — PLAN OF CARE
Acute Rehab Care Conference/Team Rounds      Type: Team Rounds    Present: Dr. Erasmo Diaz, Fabi Virgen PA, Dr. Shayy Presley Neuropsychologist, Rosa M Lora PT, Chaya Crain OT, Juanito Lugo SLP, Sylvia Turner LICSW, Carrie Aceves RD, Gilma Wagner RN, and Mercy Osman Patient.     Discharge Barriers/Treatment/Education    Rehab Diagnosis: Stroke Vascular Hemorrhagic 01.2 (R) Body Involvement (L) Brain -  left basal ganglia hemorrhage    Active Medical Co-morbidities/Prognosis:   Patient Active Problem List   Diagnosis     ICH (intracerebral hemorrhage) (H)        Safety: Patient alert, Hmong speaking, Ax1-2 roxane stedy wheelchair based. Daughter at bedside.     Pain: No pain reported this shift.     Medications, Skin, Tubes/Lines: Takes pills whole with apple sauce or water. No skin issues, no tubes, no lines.    Swallowing/Nutrition: No dysphagia concerns.     Bowel/Bladder: Mixed continent with bowel and bladder. Last BM .    Psychosocial: Pt. lives in house with  (Rush Roger) and adult children (1 daughter, 2 son).  1 stair to enter, 12 stairs within to basement but all needs can be met on main level. Pt was independent with all ADLs/IADLs, transfers, mobility and gait. Pt was dirving. Pt denies history of mental health. Pt scored 18 on PHQ-9 and reported having thoughts of harming herself. Kayli (Daughter) shared that Pt's son  by suicide and that it's been really hard on Pt. Pt. Denies alcohol\tobacco/drug use. Pt has adult children (1 daughter - Kayli Roger, 2 son - Mamta Roger and Joel Roger ). Daughter is able to provide 24/7 support on discharge. She is interested in becoming the patient's PCA.     ADLs/IADLs: Pt early in ARU stay. Pt is limited by right hemiplegia and impaired cognition. Pt requires min A with W/C>toilet and mod A with toilet>W/C squat pivot transfer. Pt requires min A with UBD tasks and mod A with LBD tasks, and mod A with footwear. Initiated NMES for right  shoulder subluxation protocol with family to assist pt with throughout the day (6x a day). Initiated FES for RUE neuro re-education and sensorimotor skills. Recommending ongoing IP rehab at this time with HC OT services upon discharge.     Mobility: Anticipate w/c based mixed mobility with family assist at discharge.  Bed Mobility: MOD A with rail  Transfer: Squat pivot MIN A with skilled setup, Pam Gudino with RN staff  Gait: not functional  Stairs: MAX A   Balance:              Ponce: 7/56 on              PASS: 13/36 on      Eager to participate, and progressed to low squat pivot transfers with MIN A. Will initiate FES for RLE tomorrow. Anticipate need for w/c for home d/t low Ponce and PASS scores.     Cognition/Language: Patient presenting with mild expressive language impairment but is able to express her wants and needs just lacking specificity at times.  Suspect that language deficits are more impacted by her cognitive impairments which are in the more moderate to severe range.  Patient and family are aware of the changes in her cognition.  Patient requiring assistance with all IADL tasks.  Family has regularly been present for sessions and is aware of the challenges and changes that she has had.  Ongoing SLP interventions upon facility discharge.    Community Re-Entry:    Transportation: Not a  - family to provide.    Decision maker: self and family    Plan of Care and goals reviewed and updated.    Discharge Plan/Recommendations    Fall Precautions: continue    Patient/Family input to goals: Yes    Anticipated rehab needs following discharge: Home with family assisting    Anticipated care giver support after discharge: Family    Estimated length of stay: 23    Overall plan for the patient: Continue IP Rehabilitation.       Utilization Review and Continued Stay Justification    Medical Necessity Criteria:    For any criteria that is not met, please document reason and plan for discharge,  transfer, or modification of plan of care to address.    Requires intensive rehabilitation program to treat functional deficits?: Yes    Requires 3x per week or greater involvement of rehabilitation physician to oversee rehabilitation program?: Yes    Requires rehabilitation nursing interventions?: Yes    Patient is making functional progress?: Yes    There is a potential for additional functional progress? Yes    Patient is participating in therapy 3 hours per day a minimum of 5 days per week or 15 hours per week in 7 day period?:Yes    Has discharge needs that require coordinated discharge planning approach?:Yes            Final Physician Sign off    Statement of Approval: I approve the plan of care.     Patient Goals  Social Work Goals: Confirm discharge recommendations with therapy, coordinate safe discharge plan and remain available to support and assist as needed.    OT Predicted Duration/Target Date for Goal Attainment: 04/28/23  Therapy Frequency (OT): Daily  OT: Hygiene/Grooming: modified independent  OT: Upper Body Dressing: Minimal assist  OT: Lower Body Dressing: Minimal assist  OT: Upper Body Bathing: Supervision/stand-by assist  OT: Lower Body Bathing: Minimal assist  OT: Bed Mobility: Supervision/stand-by assist  OT: Transfer: Minimal assist  OT: Toilet Transfer/Toileting: Minimal assist    PT Predicted Duration/Target Date for Goal Attainment: 04/28/23  PT Frequency: Daily  PT: Bed Mobility: Supervision/stand-by assist  PT: Transfers: Supervision/stand-by assist (with least restrictive assistive device)  PT: Gait: Minimal assist (25 feet with least restrictive assistive device)  PT: Stairs: Minimal assist (4-5 with family support to be able to get into the house)  PT: Wheelchair Mobility: 150 feet (with supervision in manual wheelchair)      SLP: Goal 1: Patient will utilize compensatory memory strategies to recall novel information and answer orientation questions with 80% accuracy given minimal  cues.  SLP: Goal 2: Patient will engage in basic-moderate level attention and problem solving tasks with a level of 80% accuracy given minimal verbal cues.           Goal: Medical Management: Pt and family will demonstrate adequate knowledge of medications prior to discharge from ARU                       Goal: Skin Integrity: Pt will remain free of skin breakdown throughout stay at ARU by repositioning properly and advocating for incont cares                    Goal: Safety Management: Pt will remain free of falls by calling appropriately and waiting for staff assistance throughout stay at ARU                          Goal Outcome Evaluation:      Plan of Care Reviewed With: patient

## 2023-04-20 ENCOUNTER — APPOINTMENT (OUTPATIENT)
Dept: PHYSICAL THERAPY | Facility: CLINIC | Age: 53
End: 2023-04-20
Attending: PHYSICAL MEDICINE & REHABILITATION
Payer: COMMERCIAL

## 2023-04-20 ENCOUNTER — APPOINTMENT (OUTPATIENT)
Dept: SPEECH THERAPY | Facility: CLINIC | Age: 53
End: 2023-04-20
Attending: PHYSICAL MEDICINE & REHABILITATION
Payer: COMMERCIAL

## 2023-04-20 ENCOUNTER — APPOINTMENT (OUTPATIENT)
Dept: OCCUPATIONAL THERAPY | Facility: CLINIC | Age: 53
End: 2023-04-20
Attending: PHYSICAL MEDICINE & REHABILITATION
Payer: COMMERCIAL

## 2023-04-20 PROCEDURE — 99232 SBSQ HOSP IP/OBS MODERATE 35: CPT | Performed by: PHYSICAL MEDICINE & REHABILITATION

## 2023-04-20 PROCEDURE — 97530 THERAPEUTIC ACTIVITIES: CPT | Mod: GP | Performed by: REHABILITATION PRACTITIONER

## 2023-04-20 PROCEDURE — 97112 NEUROMUSCULAR REEDUCATION: CPT | Mod: GP | Performed by: REHABILITATION PRACTITIONER

## 2023-04-20 PROCEDURE — 92507 TX SP LANG VOICE COMM INDIV: CPT | Mod: GN

## 2023-04-20 PROCEDURE — 97112 NEUROMUSCULAR REEDUCATION: CPT | Mod: GP | Performed by: PHYSICAL THERAPIST

## 2023-04-20 PROCEDURE — 97112 NEUROMUSCULAR REEDUCATION: CPT | Mod: GO | Performed by: OCCUPATIONAL THERAPIST

## 2023-04-20 PROCEDURE — 97530 THERAPEUTIC ACTIVITIES: CPT | Mod: GP | Performed by: PHYSICAL THERAPIST

## 2023-04-20 PROCEDURE — 128N000003 HC R&B REHAB

## 2023-04-20 PROCEDURE — 250N000013 HC RX MED GY IP 250 OP 250 PS 637: Performed by: PHYSICIAN ASSISTANT

## 2023-04-20 RX ADMIN — ATORVASTATIN CALCIUM 20 MG: 10 TABLET, FILM COATED ORAL at 20:37

## 2023-04-20 RX ADMIN — LISINOPRIL 20 MG: 20 TABLET ORAL at 08:58

## 2023-04-20 RX ADMIN — AMLODIPINE BESYLATE 10 MG: 10 TABLET ORAL at 08:58

## 2023-04-20 RX ADMIN — SENNOSIDES AND DOCUSATE SODIUM 2 TABLET: 50; 8.6 TABLET ORAL at 20:37

## 2023-04-20 ASSESSMENT — ACTIVITIES OF DAILY LIVING (ADL)
ADLS_ACUITY_SCORE: 37
ADLS_ACUITY_SCORE: 40
ADLS_ACUITY_SCORE: 37
ADLS_ACUITY_SCORE: 37

## 2023-04-20 NOTE — PROGRESS NOTES
"Discharge Planner Post-Acute Rehab OT:      Discharge Plan: Addendum: Home w/ home OT w/ family and 24/7 A     Precautions: Fall, R mir, do not leave on commode/EOB by herself, cognition  Hmong speaking patient, will need .     Bedside Nursing to perform the following precautions for RUE shoulder subluxation:       No twisting, pulling, or lifting UE above 90 degrees of flexion or \"shoulder height\".    Support UE at all times to prevent further subluxation.    Position UE on arm tray when seated in wheelchair and on pillows when seated or lying in bed. Position UE in a neutral position.    Avoid using hemiparetic UE for BP readings and IV/PICC placement when medically appropriate.     Current Status:  ADLs:    Mobility: W/C based. min A SPT toward pt's L side, mod A to the right. Nsg roxane stedy x2.    Grooming: Set-up and SBA seated.     Dressing: UB min A, mod A with LBD tasks and footwear.    Bathing: Pt used the rolling shower chair but did SPT toward her L Min A. Pt bathed 5/10 body parts seated.     Toileting: Nsg roxane stedy x2. Min-mod A with W/C<>toilet squat pivot transfer with therapy with blocking of RLE.   IADLs: TBD. Supportive family  Vision/Cognition: Vision appears functional, screen as appropriate. Pt is disoriented, impaired memory, impaired attention, impaired speech/communication, aphasia possibly and variably impulsive- responds well to safety cues.     Assessment: Utilized FES for RUE neuro re-education and sensorimotor skills; refer to care plan note for further details.     Other Barriers to Discharge (DME, Family Training, etc):   Pt has a standard height toilet, walk in shower or tub/shower combo.  She is used to using the walk in shower and does not have any AE in the bathroom.   DME: TBD  Family training: Daughter/family will be present and sleeping over night at times. Continue ongoing family training as appropriate.  "

## 2023-04-20 NOTE — PLAN OF CARE
Goal Outcome Evaluation:      Plan of Care Reviewed With: patient, family    Overall Patient Progress: no change    Outcome Evaluation: No change in patient progress this shift.    Orientation: A/Ox4. Hmong speaking. Interpreting provided via daughter in room per patient preference   Bowel: Continent of bowel using toilet in bathroom. Patient having loose stools. Scheduled Miralax held  Bladder: Mixed continence of bladder  Pain: Denies pain  Ambulation/Transfers: Ax2 with Pma Gudino for transfers, WC based  Diet/ Liquids: Tolerating diet well with good appetite  Bed and chair alarms on for safety, call light within reach. Continue with POC.

## 2023-04-20 NOTE — PLAN OF CARE
Discharge Planner Post-Acute Rehab PT:      Discharge Plan: home with  family assistance, home PT     Precautions: falls, alarms, R hemiparesis,  Do not leave on commode/EOB alone, cognitive impairment,  Hmong  needed     Current Status:  Bed Mobility: Min A to right on mat no rail >> key contact at upper shoulder.  Transfer: Stand pivot to pt's left; MIN A for stability and facilitated wt shifting and skilled setup, Pam Gudino with RN staff  Gait: not functional  Stairs: MAX A   Balance:               Ponce:  on               PASS:  on      Assessment: pt cont to work very hard during PT session, pt may benefit for use of mirror for midline and wt shift posture.      Other Barriers to Discharge (DME, Family Training, etc):   Will need AFO, wheelchair and quad cane  Family Training        -------------------------------------------------------------------------------  Initial LE setup: 23     Skilled set up on :  Pt dependent for proper placement of electrodes on R quads, hamstrings, anterior tib, and gastroc for optimum muscle contraction, safe positioning on w/c within frame and cushion for prevention of skin breakdown, feet secured to foot pedals, w/c secured to  w/ Q-straints.  Passive motion assessed to ensure proper positioning.       Pt performed 40 minutes of active FES ergometry with 100% stimulation applied to above muscles at 25 rpm with 2.0 Nm resistance.  This PT adjusted e-stim and cycling parameters in real-time to ensure palpable muscle contractions throughout session.  Please see [email or web address] for further details on patient's stimulation parameters and ergometry outcomes.       Changes in parameters that were part of this treatment:   -resistance increased to keep speed < 40  -pulse width increased to 400us for improved contraction      Functional outcomes from this intervention include:   -reduced spasticity  -improved sensory awareness and  proprioception  -improved muscle strength  -improved motor coordination     Session Summary:   -Average Power: 1.8 W  -Asymmetry: R 6%  -Active Minutes: 38 mins

## 2023-04-20 NOTE — PROGRESS NOTES
Skilled set up on :  Pt dependent for proper placement of electrodes on RUE for optimum muscle contraction, safe positioning on w/c within frame and cushion for prevention of skin breakdown, feet secured to foot pedals, w/c secured to  w/ Q-straints.  Passive motion assessed to ensure proper positioning.       Pt performed 34 minutes of active FES ergometry with 0-100% stimulation applied to above muscles at 25-30 rpm with 0.5-2.05 Nm resistance. This OT adjusted e-stim and cycling parameters in real-time to ensure palpable muscle contractions throughout session.  Please see [email or web address] for further details on patient's stimulation parameters and ergometry outcomes.       Changes in parameters that were part of this treatment:   -no changes made this session        Functional outcomes from this intervention include:   -reduced spasticity  -improved sensory awareness and proprioception  -improved muscle strength  -improved motor coordination

## 2023-04-20 NOTE — PLAN OF CARE
FOCUS/GOAL  Bowel management, Bladder management, Mobility, Skin integrity, and Safety management    ASSESSMENT, INTERVENTIONS AND CONTINUING PLAN FOR GOAL:  Patient is alert and oriented. Patient is Hmong speaking, daughter present in room and does help with interpretation. Is assist of x 2 with andra. Patient was continent of bladder on this shift. No BM on shift. No skin issues. Patient ate 100% of her dinner. Patient denies pain. Call light is within reach, and alarm is on. Nursing staff will continue with poc.

## 2023-04-20 NOTE — PLAN OF CARE
Discharge Planner Post-Acute Rehab SLP:     Discharge Plan: Home with 24/7 supervision from family, ongoing speech therapy     Precautions: Disoriented, Mod-severe cognitive impairment, Hmong speaking (family interprets), falls risk, R hemiparesis    Current Status:  Hearing: Mild hearing loss per family report  Vision: WFL  Communication: Patient reports word-finding difficulties but overall able to make wants and needs known  Cognition: Moderate-Severe Cognitive impairment  Swallow: Easy to Chew (7) and Thin Liquids (0). Patient and family indicate that patient prefers softer foods. Will monitor if patient presents with any future difficulties.     Assessment: Family present for session and providing interpretation. Family educated re: word retrieval strategies via semantic concepts. Pt participated in continuation of basic level visual 'odd one out'. Pt with improved retrieval compared to task completed yesterday with this SLP. Items not culturally relevant to pt were given. Pt able to determine target 'difference' with 100% accuracy but notable difficulty with rationale and other similarities/differences     Other Barriers to Discharge (Family Training, etc): family training

## 2023-04-20 NOTE — PLAN OF CARE
FOCUS/GOAL  Medical management    ASSESSMENT, INTERVENTIONS AND CONTINUING PLAN FOR GOAL:  Pt is seen sleeping comfortably in bed. Daughter is present. Used call light for assistance. A2 roxane stedy transfer. Continent of B/B tonight. Pt is able to do bree cares. Denies pain. Assisted back to bed. Pt's daughter helped to interpret pt needs. This morning, CN found pt and daughter in the BR. Daughter self transfer pt w/o asking for help. Educate daughter and pt to use call light w/o hesitancy. Cont w/ POC.  Goal Outcome Evaluation:

## 2023-04-20 NOTE — PROGRESS NOTES
[facility]   Acute Rehabilitation Unit  Daily progress note    INTERVAL HISTORY  Mercy Osman was seen and examined at bedside this morning with daughter present.  No acute events reported overnight.  Patient reports to be feeling well overall, denies new concerns or questions.  Patient does not c/o pain.      OT noting improvement in right hand swelling with good positioning of right arm in bed and wheelchair with arm tray, no longer requiring isotoner glove.  Will continue to assess mental health and offer pharmacologic and non-pharmacologic strategies for depression/coping.    Functionally,  ADLs:    Mobility: W/C based. min A SPT toward pt's L side, mod A to the right. Nsg roxane gudino x2.    Grooming: Set-up and SBA seated.     Dressing: UB min A, mod A with LBD tasks and footwear.    Bathing: Pt used the rolling shower chair but did SPT toward her L Min A. Pt bathed 5/10 body parts seated.     Toileting: Nsg roxane gudino x2. Min-mod A with W/C<>toilet squat pivot transfer with therapy with blocking of RLE.   IADLs: TBD. Supportive family  Vision/Cognition: Vision appears functional, screen as appropriate. Pt is disoriented, impaired memory, impaired attention, impaired speech/communication, aphasia possibly and variably impulsive- responds well to safety cues.     Bed Mobility: Min A to right on mat no rail >> key contact at upper shoulder.  Transfer: Stand pivot to pt's left; MIN A for stability and facilitated wt shifting and skilled setup, Roxane Gudino with RN staff  Gait: not functional  Stairs: MAX A   Balance:               Ponce: 7 on               PASS:  on        MEDICATIONS    amLODIPine  10 mg Oral Daily     atorvastatin  20 mg Oral QPM     lisinopril  20 mg Oral Daily     polyethylene glycol  17 g Oral Daily     senna-docusate  2 tablet Oral At Bedtime        acetaminophen, diclofenac, hydrALAZINE, melatonin     PHYSICAL EXAM  /73 (BP Location:  "Left arm)   Pulse 86   Temp 97.9  F (36.6  C) (Oral)   Resp 16   Ht 1.473 m (4' 10\")   Wt 50.5 kg (111 lb 5.3 oz)   SpO2 97%   BMI 23.27 kg/m     Gen: NAD, in WC.  Cardio: appears well-perfused  Pulm: non-labored on room air  Abd: soft, non-tender, non-distended  Ext: no appreciable edema in bilat lower extremities, improved right hand edema today  Neuro/MSK: awake, alert, PERRL, left upper facial weakness, right lower facial droop, right hemiparesis, right hemisensory impairment      LABS  CBC RESULTS:   Recent Labs   Lab Test 04/17/23  0945 04/14/23  0803 04/13/23  0813   WBC 8.1 7.5 8.7   RBC 5.12 5.44* 5.32*   HGB 12.9 13.7 13.4   HCT 42.6 45.1 44.0   MCV 83 83 83   MCH 25.2* 25.2* 25.2*   MCHC 30.3* 30.4* 30.5*   RDW 16.5* 16.7* 16.9*    340 345     Last Basic Metabolic Panel:  Recent Labs   Lab Test 04/17/23  0945 04/14/23  0828 04/14/23  0803 04/13/23  0842 04/13/23  0813     --  139  --  139   POTASSIUM 3.5  --  4.0  --  4.0  4.0   CHLORIDE 104  --  106  --  105   CO2 23  --  20*  --  19*   ANIONGAP 12  --  13  --  15   * 97 107*   < > 99   BUN 12.4  --  12.4  --  13.3   CR 0.71  --  0.76  --  0.72   GFRESTIMATED >90  --  >90  --  >90   SALAZAR 10.0  --  9.6  --  9.6    < > = values in this interval not displayed.       Rehabilitation - continue comprehensive acute inpatient rehabilitation program with multidisciplinary approach including therapies, rehab nursing, and physiatry following. See interval history for updates.      ASSESSMENT AND PLAN  Mercy Osman is a 52 year old right hand dominant female with a past medical history of hypertension, [place]' palsy who was admitted on 4/8/23 with spontaneous acute left thalamic intracranial hemorrhage with hospital course complicated by hyperlipidemia, prediabetes, elevated troponin, anemia, hypokalemia, and loose stools.  She is now admitted to ARU on 4/14/23 for multidisciplinary rehabilitation and ongoing medical " management.        Admission to acute inpatient rehab 4/14/23.    Impairment group code: Stroke Vascular Hemorrhagic 01.2 (R) Body Involvement (L) Brain -  left basal ganglia hemorrhage        1. PT, OT and SLP 60 minutes of each on a daily basis, in addition to rehab nursing and close management of physiatrist.       2. Impairment of ADL's: Noted to have impaired activity tolerance, impaired balance, impaired coordination, impaired ROM, impaired sensation, impaired strength w/ R hemiparesis, impaired motor control, fatigue, dizziness, and impaired weight shifting, all affecting her ability to safely and independently perform basic ADLs.  Goal for min A or less with basic ADLs.     3. Impairment of mobility:  Noted to have impaired activity tolerance, impaired balance, impaired coordination, impaired ROM, impaired sensation, impaired strength w/ R hemiparesis, impaired motor control, fatigue, dizziness, and impaired weight shifting, all affecting her ability to safely and independently perform basic mobility.  Goal for min A or less with basic mobility.     4. Impairment of cognition/language/swallow:  Noted to have dysarthria and dysphagia, and would benefit from full cognitive-linguistic evaluation at ARU with goals for improved cognitive-linguistic skills to meet basic needs and safe tolerance of least restrictive diet.     5. Medical Conditions  New actions/orders/updates for today are in blue.     Left basal ganglia intraparenchymal hemorrhage, spontaneous, likely secondary to uncontrolled hypertension  Vasogenic cerebral edema  Deficits: right hemiparesis, right facial droop, right hemisensory deficit, right visual neglect, dysarthria, ?aphasia, dysphagia, suspect impaired cognition  - Long-term BP goal 140/90 or less, management as below  - Stroke PLC completed 4/10 (during inpatient stay)  - 4/18 noting some generalized right-sided pain but unable to further describe.  ?central.  Also with significant R  "shoulder subluxation. Continue PRN Tylenol and added PRN voltaren.  - Continue PT/OT/SLP  - Follow up with neurology 6-8 weeks after your discharge      Hypertension  PTA on amlodipine-benazepril 10-20 mg capsule.  BP elevated to 220s/100s at presentation.  Initially managed on nicardipine drip, then transitioned to PO lisinopril + amlodipine.  - Long-term BP goal 140/90 or less  - Continue Lisinopril 20mg daily  - Continue amlodipine 10mg daily  - PRN hydralazine for SBP >160  - Monitor BP, adjust regimen as indicated     Hyperlipidemia  Started on statin therapy this admission with .  - Continue atorvastatin 20 mg daily     Troponin elevation, asymptomatic, spontaneously resolved  EKG without obvious acute ischemic changes.  Echo WNL.  No cardiogenic symptoms, suspect transient stress related troponin leak.  Peaked to 113 on 4/10.  Troponin improved spontaneously without intervention.  - Further work-up if any cardiogenic symptoms develop     Hypokalemia, resolved s/p replacement  Hyperchloremia, resolved with adequate hydration   - Trend BMP every Monday.  : K WNL 3.5, Cl   - Replete as needed per protocol     Pre-diabetes   Hgb A1c 6.2% 2023   - Monitor with surveillance with primary care outpatient  - No routine BG checks indicated, monitor periodically with BMP     Anemia, chronic   Hgb 11.5 on presentation.  WNL at 13.4 as of .  - Hgb goal >7, plt goal >50k  - Trend CBC every Monday.  : Hgb WNL 12.9    Major Depressive Disorder, recurrent episode, moderate  SW intake with PHQ-9 score 18, did indicate some passive suicidal ideation.  Per family, have noted some symptoms of depression since patient's son  of suicide ~3 years ago.  - Psychiatry consulted over weekend, appreciate assistance.  Met with patient today.  Discussed potential medication trial, but patient would like to \"think about it\".  If agreeable, psychiatry recommending Remeron 7.5 mg at HS.  - Also offered health " psychology supports.  Again patient notes she would like to think about it for a few days and let us know.  Will plan to revisit.       6. Adjustment to disability:  Clinical psychology to eval and treat if indicated  7. FEN: easy to chew (level 7) diet, thin liquids  8. Bowel: continent/incontinent, monitor, PRN and scheduled bowel meds available  9. Bladder: continent/incontinent  10. DVT Prophylaxis: mechanical  11. GI Prophylaxis: not indicated  12. Code: full  13. Disposition: goal for home with family support  14. ELOS:  target 5/5/23  15. Follow up Appointments on Discharge: PCP in 1-2 weeks, neurology in 6-8 weeks after discharge      Doing well. Discussed with team. Continue cares and plans outlined.     Erasmo Diaz MD

## 2023-04-21 ENCOUNTER — APPOINTMENT (OUTPATIENT)
Dept: PHYSICAL THERAPY | Facility: CLINIC | Age: 53
End: 2023-04-21
Attending: PHYSICAL MEDICINE & REHABILITATION
Payer: COMMERCIAL

## 2023-04-21 ENCOUNTER — APPOINTMENT (OUTPATIENT)
Dept: OCCUPATIONAL THERAPY | Facility: CLINIC | Age: 53
End: 2023-04-21
Attending: PHYSICAL MEDICINE & REHABILITATION
Payer: COMMERCIAL

## 2023-04-21 ENCOUNTER — APPOINTMENT (OUTPATIENT)
Dept: SPEECH THERAPY | Facility: CLINIC | Age: 53
End: 2023-04-21
Attending: PHYSICAL MEDICINE & REHABILITATION
Payer: COMMERCIAL

## 2023-04-21 PROCEDURE — 250N000013 HC RX MED GY IP 250 OP 250 PS 637: Performed by: PHYSICIAN ASSISTANT

## 2023-04-21 PROCEDURE — 97535 SELF CARE MNGMENT TRAINING: CPT | Mod: GO

## 2023-04-21 PROCEDURE — 128N000003 HC R&B REHAB

## 2023-04-21 PROCEDURE — 97112 NEUROMUSCULAR REEDUCATION: CPT | Mod: GO

## 2023-04-21 PROCEDURE — 99232 SBSQ HOSP IP/OBS MODERATE 35: CPT | Performed by: PHYSICAL MEDICINE & REHABILITATION

## 2023-04-21 PROCEDURE — 92507 TX SP LANG VOICE COMM INDIV: CPT | Mod: GN | Performed by: SPEECH-LANGUAGE PATHOLOGIST

## 2023-04-21 PROCEDURE — 97112 NEUROMUSCULAR REEDUCATION: CPT | Mod: GP

## 2023-04-21 RX ADMIN — ATORVASTATIN CALCIUM 20 MG: 10 TABLET, FILM COATED ORAL at 20:54

## 2023-04-21 RX ADMIN — SENNOSIDES AND DOCUSATE SODIUM 2 TABLET: 50; 8.6 TABLET ORAL at 20:54

## 2023-04-21 ASSESSMENT — ACTIVITIES OF DAILY LIVING (ADL)
ADLS_ACUITY_SCORE: 37
ADLS_ACUITY_SCORE: 42
ADLS_ACUITY_SCORE: 37
ADLS_ACUITY_SCORE: 42
ADLS_ACUITY_SCORE: 37
ADLS_ACUITY_SCORE: 42
ADLS_ACUITY_SCORE: 37
ADLS_ACUITY_SCORE: 42
ADLS_ACUITY_SCORE: 37
ADLS_ACUITY_SCORE: 42
ADLS_ACUITY_SCORE: 37
ADLS_ACUITY_SCORE: 42

## 2023-04-21 NOTE — PLAN OF CARE
VS: Temp: 98.8  F (37.1  C) Temp src: Oral BP: 103/77 Pulse: 97   Resp: 16 SpO2: 93 % O2 Device: None (Room air)     O2: >92% RA   Output: Mixed continence   Last BM: 4/20/23   Activity: A1 w/ roxane stedy   Skin: Intact    Pain: Denies    CMS: Alert and oriented x4. W/ word finding difficulties.  Numbness to RLE and RUE   Dressing: None    Diet: Easy to chew L7, thin liquids   LDA: None    Equipment: Wheelchair, GB   Plan: TBD. Call light is in reach, continue w/ POC.   Additional Info: Pt is Hmong speaking. Pt has family member at bedside, helps with translation.  R hand swelling improved per pt, no longer needing the edema glove. Supports arm with w/c arm tray and uses pillows for support in bed.

## 2023-04-21 NOTE — PLAN OF CARE
FOCUS/GOAL  Bowel management, Bladder management, Mobility, Skin integrity, and Safety management    ASSESSMENT, INTERVENTIONS AND CONTINUING PLAN FOR GOAL:  Patient is alert and oriented. Patient is Hmong speaking, daughter present in room and does help with interpretation. Is assist of x 2 with andra. Patient was continent of bladder on this shift. No BM on shift. No skin issues. Patient ate 75% of her dinner. Patient denies pain. Patient will also spend a night with the patient. Call light is within reach, and alarm is on. Nursing staff will continue with poc.  Goal Outcome Evaluation:

## 2023-04-21 NOTE — PLAN OF CARE
Discharge Planner Post-Acute Rehab SLP:     Discharge Plan: Home with 24/7 supervision from family, ongoing speech therapy     Precautions: Disoriented, Mod-severe cognitive impairment, Hmong speaking (family interprets), falls risk, R hemiparesis    Current Status:  Hearing: Mild hearing loss per family report  Vision: WFL  Communication: Patient reports word-finding difficulties but overall able to make wants and needs known  Cognition: Moderate-Severe Cognitive impairment  Swallow: Easy to Chew (7) and Thin Liquids (0). Patient and family indicate that patient prefers softer foods. Will monitor if patient presents with any future difficulties.     Assessment: Daughter present, acted as . Instructed pt in picture naming task with description component, pt named object/picture in 80% opportunities, described use/purpose/location to SLP questions with 100% accuracy.    Other Barriers to Discharge (Family Training, etc): family training

## 2023-04-21 NOTE — PROGRESS NOTES
"  [facility]   Acute Rehabilitation Unit  Daily progress note    INTERVAL HISTORY  Mercy Osman was seen and examined at bedside this morning with daughter and son present.  No acute events reported overnight.  Patient reports to be feeling well overall, denies new concerns or questions.  Patient does not c/o pain.  Reports some more return in her right side strength.    OT noting improvement in right hand swelling with good positioning of right arm in bed and wheelchair with arm tray, no longer requiring isotoner glove.  Will continue to assess mental health and offer pharmacologic and non-pharmacologic strategies for depression/coping.    Functionally,  Hearing: Mild hearing loss per family report  Vision: WFL  Communication: Patient reports word-finding difficulties but overall able to make wants and needs known  Cognition: Moderate-Severe Cognitive impairment  Swallow: Easy to Chew (7) and Thin Liquids (0). Patient and family indicate that patient prefers softer foods. Will monitor if patient presents with any future difficulties.         PT:  Dgtr Kayli li to transfer/assist with toileting  Bed Mobility: Min A to right on mat no rail >> key contact at upper shoulder.  Transfer: Min A to the L, mod A R for stand pivot with R AFO. Pam Gudino with RN staff  Gait: up to 40 ft, min A with WBQC and R AFO  Stairs: MAX A        MEDICATIONS    amLODIPine  10 mg Oral Daily     atorvastatin  20 mg Oral QPM     lisinopril  20 mg Oral Daily     polyethylene glycol  17 g Oral Daily     senna-docusate  2 tablet Oral At Bedtime        acetaminophen, diclofenac, hydrALAZINE, melatonin     PHYSICAL EXAM  /77 (BP Location: Left arm)   Pulse 97   Temp 98.8  F (37.1  C) (Oral)   Resp 16   Ht 1.473 m (4' 10\")   Wt 50.5 kg (111 lb 5.3 oz)   SpO2 93%   BMI 23.27 kg/m     Gen: NAD, in WC.  Cardio: appears well-perfused  Pulm: non-labored on room air  Abd: soft, non-tender, " non-distended  Ext: no appreciable edema in bilat lower extremities, improved right hand edema today  Neuro/MSK: awake, alert, PERRL, left upper facial weakness, right lower facial droop, right hemiparesis, has 3- EF now. right hemisensory impairment      LABS  CBC RESULTS:   Recent Labs   Lab Test 04/17/23  0945 04/14/23  0803 04/13/23  0813   WBC 8.1 7.5 8.7   RBC 5.12 5.44* 5.32*   HGB 12.9 13.7 13.4   HCT 42.6 45.1 44.0   MCV 83 83 83   MCH 25.2* 25.2* 25.2*   MCHC 30.3* 30.4* 30.5*   RDW 16.5* 16.7* 16.9*    340 345     Last Basic Metabolic Panel:  Recent Labs   Lab Test 04/17/23  0945 04/14/23  0828 04/14/23  0803 04/13/23  0842 04/13/23  0813     --  139  --  139   POTASSIUM 3.5  --  4.0  --  4.0  4.0   CHLORIDE 104  --  106  --  105   CO2 23  --  20*  --  19*   ANIONGAP 12  --  13  --  15   * 97 107*   < > 99   BUN 12.4  --  12.4  --  13.3   CR 0.71  --  0.76  --  0.72   GFRESTIMATED >90  --  >90  --  >90   SALAZAR 10.0  --  9.6  --  9.6    < > = values in this interval not displayed.       Rehabilitation - continue comprehensive acute inpatient rehabilitation program with multidisciplinary approach including therapies, rehab nursing, and physiatry following. See interval history for updates.      ASSESSMENT AND PLAN  Mercy Osman is a 52 year old right hand dominant female with a past medical history of hypertension, [place]' palsy who was admitted on 4/8/23 with spontaneous acute left thalamic intracranial hemorrhage with hospital course complicated by hyperlipidemia, prediabetes, elevated troponin, anemia, hypokalemia, and loose stools.  She is now admitted to ARU on 4/14/23 for multidisciplinary rehabilitation and ongoing medical management.        Admission to acute inpatient rehab 4/14/23.    Impairment group code: Stroke Vascular Hemorrhagic 01.2 (R) Body Involvement (L) Brain -  left basal ganglia hemorrhage        1. PT, OT and SLP 60 minutes of each on a daily basis, in addition to  rehab nursing and close management of physiatrist.       2. Impairment of ADL's: Noted to have impaired activity tolerance, impaired balance, impaired coordination, impaired ROM, impaired sensation, impaired strength w/ R hemiparesis, impaired motor control, fatigue, dizziness, and impaired weight shifting, all affecting her ability to safely and independently perform basic ADLs.  Goal for min A or less with basic ADLs.     3. Impairment of mobility:  Noted to have impaired activity tolerance, impaired balance, impaired coordination, impaired ROM, impaired sensation, impaired strength w/ R hemiparesis, impaired motor control, fatigue, dizziness, and impaired weight shifting, all affecting her ability to safely and independently perform basic mobility.  Goal for min A or less with basic mobility.     4. Impairment of cognition/language/swallow:  Noted to have dysarthria and dysphagia, and would benefit from full cognitive-linguistic evaluation at ARU with goals for improved cognitive-linguistic skills to meet basic needs and safe tolerance of least restrictive diet.     5. Medical Conditions  New actions/orders/updates for today are in blue.     Left basal ganglia intraparenchymal hemorrhage, spontaneous, likely secondary to uncontrolled hypertension  Vasogenic cerebral edema  Deficits: right hemiparesis, right facial droop, right hemisensory deficit, right visual neglect, dysarthria, ?aphasia, dysphagia, suspect impaired cognition  - Long-term BP goal 140/90 or less, management as below  - Stroke PLC completed 4/10 (during inpatient stay)  - 4/18 noting some generalized right-sided pain but unable to further describe.  ?central.  Also with significant R shoulder subluxation. Continue PRN Tylenol and added PRN voltaren.  - Continue PT/OT/SLP  - Follow up with neurology 6-8 weeks after your discharge      Hypertension  PTA on amlodipine-benazepril 10-20 mg capsule.  BP elevated to 220s/100s at presentation.  Initially  "managed on nicardipine drip, then transitioned to PO lisinopril + amlodipine.  - Long-term BP goal 140/90 or less  - Continue Lisinopril 20mg daily  - Continue amlodipine 10mg daily  - PRN hydralazine for SBP >160  - Monitor BP, adjust regimen as indicated     Hyperlipidemia  Started on statin therapy this admission with .  - Continue atorvastatin 20 mg daily     Troponin elevation, asymptomatic, spontaneously resolved  EKG without obvious acute ischemic changes.  Echo WNL.  No cardiogenic symptoms, suspect transient stress related troponin leak.  Peaked to 113 on 4/10.  Troponin improved spontaneously without intervention.  - Further work-up if any cardiogenic symptoms develop     Hypokalemia, resolved s/p replacement  Hyperchloremia, resolved with adequate hydration   - Trend BMP every Monday.  : K WNL 3.5, Cl   - Replete as needed per protocol     Pre-diabetes   Hgb A1c 6.2% 2023   - Monitor with surveillance with primary care outpatient  - No routine BG checks indicated, monitor periodically with BMP     Anemia, chronic   Hgb 11.5 on presentation.  WNL at 13.4 as of .  - Hgb goal >7, plt goal >50k  - Trend CBC every Monday.  : Hgb WNL 12.9    Major Depressive Disorder, recurrent episode, moderate  SW intake with PHQ-9 score 18, did indicate some passive suicidal ideation.  Per family, have noted some symptoms of depression since patient's son  of suicide ~3 years ago.  - Psychiatry consulted over weekend, appreciate assistance.  Met with patient today.  Discussed potential medication trial, but patient would like to \"think about it\".  If agreeable, psychiatry recommending Remeron 7.5 mg at HS.  - Also offered health psychology supports.  Again patient notes she would like to think about it for a few days and let us know.  Will plan to revisit.       6. Adjustment to disability:  Clinical psychology to eval and treat if indicated  7. FEN: easy to chew (level 7) diet, thin " liquids  8. Bowel: continent/incontinent, monitor, PRN and scheduled bowel meds available  9. Bladder: continent/incontinent  10. DVT Prophylaxis: mechanical  11. GI Prophylaxis: not indicated  12. Code: full  13. Disposition: goal for home with family support  14. ELOS:  target 5/5/23  15. Follow up Appointments on Discharge: PCP in 1-2 weeks, neurology in 6-8 weeks after discharge      Doing well. Discussed with team. Continue cares and plans outlined.     Erasmo Diaz MD

## 2023-04-21 NOTE — PROGRESS NOTES
"Discharge Planner Post-Acute Rehab OT:      Discharge Plan: Addendum: Home w/ home OT w/ family and 24/7 A     Precautions: Fall, R mir, do not leave on commode/EOB by herself, cognition  Hmong speaking patient, will need .     Bedside Nursing to perform the following precautions for RUE shoulder subluxation:       No twisting, pulling, or lifting UE above 90 degrees of flexion or \"shoulder height\".    Support UE at all times to prevent further subluxation.    Position UE on arm tray when seated in wheelchair and on pillows when seated or lying in bed. Position UE in a neutral position.    Avoid using hemiparetic UE for BP readings and IV/PICC placement when medically appropriate.     Current Status:  ADLs:    Mobility: W/C based. min A SPT toward pt's L side, mod A to the right. Nsg roxane stedy x2.    Grooming: Set-up and SBA seated.     Dressing: UB min A, mod A with LBD tasks and footwear.    Bathing: Pt used the rolling shower chair but did SPT toward her L Min A. Pt bathed 5/10 body parts seated.     Toileting: Nsg roxane stedy x2. Min-mod A with W/C<>toilet squat pivot transfer with therapy with blocking of RLE.   IADLs: TBD. Supportive family  Vision/Cognition: Vision appears functional, screen as appropriate. Pt is disoriented, impaired memory, impaired attention, impaired speech/communication, aphasia possibly and variably impulsive- responds well to safety cues.     Assessment: Per pt and daughter, pt with improving elbow flex, able to bring RUE through almost full elbow flex/ext with increasing control this day. Increasing AAROM shoulder protract/retract in gravity eliminated positioning.      Other Barriers to Discharge (DME, Family Training, etc):   Pt has a standard height toilet, walk in shower or tub/shower combo.  She is used to using the walk in shower and does not have any AE in the bathroom.   DME: TBD  Family training: Daughter/family will be present and sleeping over night at times. " Continue ongoing family training as appropriate.

## 2023-04-21 NOTE — PLAN OF CARE
Discharge Planner Post-Acute Rehab PT:      Discharge Plan: home with / family assistance, home PT     Precautions: falls, alarms, R hemiparesis,  Do not leave on commode/EOB alone, cognitive impairment,  Hmong  needed     Current Status: *Dgtr Kayli li to transfer/assist with toileting  Bed Mobility: Min A to right on mat no rail >> key contact at upper shoulder.  Transfer: Min A to the L, mod A R for stand pivot with R AFO. Pam Gudino with RN staff  Gait: up to 40 ft, min A with WBQC and R AFO  Stairs: MAX A   Balance:               [place]:  on               PASS:  on      Assessment: FES today. See below     Other Barriers to Discharge (DME, Family Training, etc):   Will need AFO, wheelchair and quad cane  Family Training    -------------------------------------------------------------------------------------------------------  Skilled set up on :  Pt dependent for proper placement of electrodes on R quads, hamstrings, anterior tib, and gastroc for optimum muscle contraction, safe positioning on w/c within frame and cushion for prevention of skin breakdown, feet secured to foot pedals, w/c secured to  w/ Q-straints.  Passive motion assessed to ensure proper positioning.       Pt performed 34.5 minutes of active FES ergometry with 100% stimulation applied to above muscles at 25-35 rpm with 1.99 Nm resistance.  This PT adjusted e-stim and cycling parameters in real-time to ensure palpable muscle contractions throughout session.  Please see [email or web address] for further details on patient's stimulation parameters and ergometry outcomes.       Changes in parameters that were part of this treatment:   -resistance titrated up from 0.5 to stabilize SPM range. Education on how to use R/L percentages to cue symmetry     Functional outcomes from this intervention include:   -reduced spasticity  -improved sensory awareness and proprioception  -improved muscle strength  -improved motor  coordination     Session Summary:   -Average Power: 5.9 W  -Asymmetry: L 3%  -Active Minutes: 34 mins

## 2023-04-21 NOTE — PLAN OF CARE
FOCUS/GOAL  Bowel management, Bladder management, Pain management, Mobility, Skin integrity, and Safety management    ASSESSMENT, INTERVENTIONS AND CONTINUING PLAN FOR GOAL:  Patient is alert and oriented. Patient slept well through the night. Patient is Hmong speaking, daughter present in room. Is assist of x 2 with andra. Patient was continent on this shift. LBM was 4/20/23. No skin issues. Patient denies pain. Call light is within reach, and alarm is on. Daughter is at bed side as well. Nursing staff will continue with poc.  Goal Outcome Evaluation:

## 2023-04-22 ENCOUNTER — APPOINTMENT (OUTPATIENT)
Dept: OCCUPATIONAL THERAPY | Facility: CLINIC | Age: 53
End: 2023-04-22
Attending: PHYSICAL MEDICINE & REHABILITATION
Payer: COMMERCIAL

## 2023-04-22 ENCOUNTER — APPOINTMENT (OUTPATIENT)
Dept: SPEECH THERAPY | Facility: CLINIC | Age: 53
End: 2023-04-22
Attending: PHYSICAL MEDICINE & REHABILITATION
Payer: COMMERCIAL

## 2023-04-22 LAB — LACTATE SERPL-SCNC: 1.7 MMOL/L (ref 0.7–2)

## 2023-04-22 PROCEDURE — 128N000003 HC R&B REHAB

## 2023-04-22 PROCEDURE — 250N000013 HC RX MED GY IP 250 OP 250 PS 637: Performed by: PHYSICIAN ASSISTANT

## 2023-04-22 PROCEDURE — 36415 COLL VENOUS BLD VENIPUNCTURE: CPT | Performed by: PHYSICAL MEDICINE & REHABILITATION

## 2023-04-22 PROCEDURE — 92507 TX SP LANG VOICE COMM INDIV: CPT | Mod: GN | Performed by: SPEECH-LANGUAGE PATHOLOGIST

## 2023-04-22 PROCEDURE — 97112 NEUROMUSCULAR REEDUCATION: CPT | Mod: GO

## 2023-04-22 PROCEDURE — 97110 THERAPEUTIC EXERCISES: CPT | Mod: GO

## 2023-04-22 PROCEDURE — 83605 ASSAY OF LACTIC ACID: CPT | Performed by: PHYSICAL MEDICINE & REHABILITATION

## 2023-04-22 RX ADMIN — LISINOPRIL 20 MG: 20 TABLET ORAL at 08:31

## 2023-04-22 RX ADMIN — ATORVASTATIN CALCIUM 20 MG: 10 TABLET, FILM COATED ORAL at 21:05

## 2023-04-22 RX ADMIN — AMLODIPINE BESYLATE 10 MG: 10 TABLET ORAL at 08:31

## 2023-04-22 RX ADMIN — SENNOSIDES AND DOCUSATE SODIUM 2 TABLET: 50; 8.6 TABLET ORAL at 21:05

## 2023-04-22 ASSESSMENT — ACTIVITIES OF DAILY LIVING (ADL)
ADLS_ACUITY_SCORE: 40
ADLS_ACUITY_SCORE: 42
ADLS_ACUITY_SCORE: 40
ADLS_ACUITY_SCORE: 40
ADLS_ACUITY_SCORE: 42
ADLS_ACUITY_SCORE: 40
ADLS_ACUITY_SCORE: 40

## 2023-04-22 NOTE — PLAN OF CARE
Goal Outcome Evaluation:      Plan of Care Reviewed With: patient, child    Overall Patient Progress: no change    Outcome Evaluation: No change in patient progress this shift.    Orientation: A/Ox4. Hmong speaking. Interpreting provided via daughter in room per patient preference   Bowel: No BM on this shift  Bladder: Continent of bladder using toilet in bathroom  Pain: Denies pain  Ambulation/Transfers: Ax1 with Pam Gudino for transfers, WC based. Patient's daughter approved for transfers   Diet/ Liquids: Tolerating diet well with good appetite. Encouraged fluids this shift  Other: Triggered Sepsis Protocol this shift. VSS. Lactic= 1.7. Continue to monitor  Bed and chair alarms on for safety, call light within reach. Continue with POC.

## 2023-04-22 NOTE — PLAN OF CARE
FOCUS/GOAL  Medical management    ASSESSMENT, INTERVENTIONS AND CONTINUING PLAN FOR GOAL:  Pt is seen sleeping during shift change and safety round checks. Patient's daughter assisted her to the BR around 3 am using the wheelchair. Continent of bladder. No other concern noted. Cont w/ POC.  Goal Outcome Evaluation:

## 2023-04-22 NOTE — PROGRESS NOTES
Skilled set up on :  Pt dependent for proper placement of electrodes on RUE for optimum muscle contraction, safe positioning on w/c within frame and cushion for prevention of skin breakdown, feet secured to foot pedals, w/c secured to  w/ Q-straints.  Passive motion assessed to ensure proper positioning.       Pt performed 32 minutes of active FES ergometry with 0-100% stimulation applied to above muscles at 25-30 rpm with 0.5-2.05 Nm resistance. This OT adjusted e-stim and cycling parameters in real-time to ensure palpable muscle contractions throughout session.  Please see [email or web address] for further details on patient's stimulation parameters and ergometry outcomes.       Changes in parameters that were part of this treatment:   -no changes made this session        Functional outcomes from this intervention include:   -reduced spasticity  -improved sensory awareness and proprioception  -improved muscle strength  -improved motor coordination

## 2023-04-22 NOTE — PLAN OF CARE
FOCUS/GOAL  Bowel management, Bladder management, Pain management, Mobility, Skin integrity, and Safety management    ASSESSMENT, INTERVENTIONS AND CONTINUING PLAN FOR GOAL:  Patient is alert and oriented. Patient is Hmong speaking, daughter present in room and does help with interpretation and transfers as needed after she was given ok to. Is assist of x 2 with tiay. Patient was continent of bladder on this shift. No BM on shift. Patient ate 100% of her dinner. Patient denies pain. Patient will also spend a night with the patient. Patient had a shower and her skin is good. Call light is within reach, and alarm is on. Nursing staff will continue with poc.  Goal Outcome Evaluation:

## 2023-04-22 NOTE — PROGRESS NOTES
"Discharge Planner Post-Acute Rehab OT:      Discharge Plan: Addendum: Home w/ home OT w/ family and 24/7 A     Precautions: Fall, R mir, *Dgtr Kayli okay to transfer/assist with toileting  Hmong speaking patient     Bedside Nursing to perform the following precautions for RUE shoulder subluxation:       No twisting, pulling, or lifting UE above 90 degrees of flexion or \"shoulder height\".    Support UE at all times to prevent further subluxation.    Position UE on arm tray when seated in wheelchair and on pillows when seated or lying in bed. Position UE in a neutral position.    Avoid using hemiparetic UE for BP readings and IV/PICC placement when medically appropriate.     Current Status:  ADLs:    Mobility: W/C based. min A SPT toward pt's L side, mod A to the right. Nsg roxane stedy x2.    Grooming: Set-up and SBA seated.     Dressing: UB min A, mod A with LBD tasks and footwear.    Bathing: Pt used the rolling shower chair but did SPT toward her L Min A. Pt bathed 5/10 body parts seated.     Toileting: Nsg roxane stedy x2. Min-mod A with W/C<>toilet squat pivot transfer with therapy with blocking of RLE.   IADLs: TBD. Supportive family  Vision/Cognition: Vision appears functional, screen as appropriate. Pt is disoriented, impaired memory, impaired attention, impaired speech/communication, aphasia possibly and variably impulsive- responds well to safety cues.      Assessment: This writer speaking in pt's native language. Utilized FES for RUE neuro re-education and sensorimotor skills; refer to care plan note for further details. Pt's daughter, Kayli was present and is now clear to transfer pt. Pt demonstrated increasing muscle activity in elbow, forearm and hands. Daughter able to assist patient w/ exercises following education/instrucitons.     Other Barriers to Discharge (DME, Family Training, etc):   Pt has a standard height toilet, walk in shower or tub/shower combo.  She is used to using the walk in shower and " does not have any AE in the bathroom.   DME: TBD  Family training: Daughter/family will be present and sleeping over night at times. Continue ongoing family training as appropriate.

## 2023-04-22 NOTE — PLAN OF CARE
Discharge Planner Post-Acute Rehab SLP:     Discharge Plan: Home with 24/7 supervision from family, ongoing speech therapy     Precautions: Disoriented, Mod-severe cognitive impairment, Hmong speaking (family interprets), falls risk, R hemiparesis    Current Status:  Hearing: Mild hearing loss per family report  Vision: WFL  Communication: Patient reports word-finding difficulties but overall able to make wants and needs known  Cognition: Moderate-Severe Cognitive impairment  Swallow: Easy to Chew (7) and Thin Liquids (0). Patient and family indicate that patient prefers softer foods. Will monitor if patient presents with any future difficulties.     Assessment: Daughter present, acted as . Instructed pt in divergent naming task, pt required moderate verbal cues to generate with 70% accuracy.    Other Barriers to Discharge (Family Training, etc): family training

## 2023-04-23 ENCOUNTER — APPOINTMENT (OUTPATIENT)
Dept: PHYSICAL THERAPY | Facility: CLINIC | Age: 53
End: 2023-04-23
Attending: PHYSICAL MEDICINE & REHABILITATION
Payer: COMMERCIAL

## 2023-04-23 ENCOUNTER — APPOINTMENT (OUTPATIENT)
Dept: OCCUPATIONAL THERAPY | Facility: CLINIC | Age: 53
End: 2023-04-23
Attending: PHYSICAL MEDICINE & REHABILITATION
Payer: COMMERCIAL

## 2023-04-23 ENCOUNTER — APPOINTMENT (OUTPATIENT)
Dept: SPEECH THERAPY | Facility: CLINIC | Age: 53
End: 2023-04-23
Attending: PHYSICAL MEDICINE & REHABILITATION
Payer: COMMERCIAL

## 2023-04-23 PROCEDURE — 97530 THERAPEUTIC ACTIVITIES: CPT | Mod: GP | Performed by: PHYSICAL THERAPIST

## 2023-04-23 PROCEDURE — 97129 THER IVNTJ 1ST 15 MIN: CPT | Mod: GN | Performed by: SPEECH-LANGUAGE PATHOLOGIST

## 2023-04-23 PROCEDURE — 99231 SBSQ HOSP IP/OBS SF/LOW 25: CPT | Performed by: PHYSICAL MEDICINE & REHABILITATION

## 2023-04-23 PROCEDURE — 128N000003 HC R&B REHAB

## 2023-04-23 PROCEDURE — 97112 NEUROMUSCULAR REEDUCATION: CPT | Mod: GP | Performed by: PHYSICAL THERAPIST

## 2023-04-23 PROCEDURE — 97130 THER IVNTJ EA ADDL 15 MIN: CPT | Mod: GN | Performed by: SPEECH-LANGUAGE PATHOLOGIST

## 2023-04-23 PROCEDURE — 250N000013 HC RX MED GY IP 250 OP 250 PS 637: Performed by: PHYSICIAN ASSISTANT

## 2023-04-23 PROCEDURE — 97760 ORTHOTIC MGMT&TRAING 1ST ENC: CPT | Mod: GO

## 2023-04-23 RX ADMIN — LISINOPRIL 20 MG: 20 TABLET ORAL at 07:47

## 2023-04-23 RX ADMIN — SENNOSIDES AND DOCUSATE SODIUM 2 TABLET: 50; 8.6 TABLET ORAL at 21:09

## 2023-04-23 RX ADMIN — ATORVASTATIN CALCIUM 20 MG: 10 TABLET, FILM COATED ORAL at 21:09

## 2023-04-23 ASSESSMENT — ACTIVITIES OF DAILY LIVING (ADL)
ADLS_ACUITY_SCORE: 40

## 2023-04-23 NOTE — PLAN OF CARE
"Goal Outcome Evaluation:         VS: BP 96/63 (BP Location: Left arm)   Pulse 89   Temp 98.4  F (36.9  C) (Oral)   Resp 18   Ht 1.473 m (4' 10\")   Wt 50.5 kg (111 lb 5.3 oz)   SpO2 99%   BMI 23.27 kg/m     O2: RA   Last BM: 4/20   Activity: Assist of 1 sarasteady   Skin: WDL   Pain: Denies   CMS: AOx4. Right sided weakness   Diet: Easy to chew   LDA: No piv   Equipment: Personal belongings, daughter and wheelchair   Plan: Continue POC   Additional Info:                   "

## 2023-04-23 NOTE — PLAN OF CARE
Goal Outcome Evaluation:      Plan of Care Reviewed With: patient    Overall Patient Progress: no change    Outcome Evaluation: Pt.alert orientedx4 cooperates with cares and therapy. Denies pain, N/V, SOb and chest pain. Pt. family at bedside most of the shift.    FOCUS/GOAL  Pain management, Safety management     ASSESSMENT, INTERVENTIONS AND CONTINUING PLAN FOR GOAL:  Orientation: Alert and oriented x 4, cooperates with cares and therapy  Bowel: continent in Bowel LBM 04/22/23, per patient, refused scheduled Miralax  Bladder: continent in bladder uses bathroom toilet  Pain: Pt. denies pain, N/V, chest pain and SOB  Ambulation/Transfers: A1 Pam Gudino, daughter transfer pt. Using stand pivot to wheelchair  Diet/ Liquids: Regular, thin , takes pills whole  Pt. BP this /65. Pt. denies SOB and chest pain, 3 side rails UP, call light/bedside table in reach. Pt. Family at bedside most of the shift. Continue with POC.

## 2023-04-23 NOTE — PROGRESS NOTES
"Discharge Planner Post-Acute Rehab OT:      Discharge Plan: Addendum: Home w/ home OT w/ family and 24/7 A     Precautions: Fall, R mir, *Dgtr Kayli li to transfer/assist with toileting  Hmong speaking patient     Bedside Nursing to perform the following precautions for RUE shoulder subluxation:       No twisting, pulling, or lifting UE above 90 degrees of flexion or \"shoulder height\".    Support UE at all times to prevent further subluxation.    Position UE on arm tray when seated in wheelchair and on pillows when seated or lying in bed. Position UE in a neutral position.    Avoid using hemiparetic UE for BP readings and IV/PICC placement when medically appropriate.     Current Status:  ADLs:    Mobility: W/C based. min A SPT toward pt's L side, mod A to the right. Nsg roxane stedy x2.    Grooming: Set-up and SBA seated.     Dressing: UB min A, mod A with LBD tasks and footwear.    Bathing: Pt used the rolling shower chair but did SPT toward her L Min A. Pt bathed 5/10 body parts seated.     Toileting: Nsg roxane stedy x2. Min-mod A with W/C<>toilet squat pivot transfer with therapy with blocking of RLE.   IADLs: TBD. Supportive family  Vision/Cognition: Vision appears functional, screen as appropriate. Pt is disoriented, impaired memory, impaired attention, impaired speech/communication, aphasia possibly and variably impulsive- responds well to safety cues.      Assessment: Resting hand splint fabricated for pt. Daughter demonstrated understanding w/ wearing schedule and care. Will check back in w/ daughter and pt w/ 1 hour wearing trial for splint re-assessment. Pt to don nights and days prn with skin checks w/ every removal. Family to notify OT if they notice any skin/pressure or other issues w/ splint. Splint to maintain full stretch of wrist/hand/finger ROM in functional position, decrease possible edema and prevention of flexor tone. Pt demonstrates mild flexor tone in Ips.     Other Barriers to Discharge " (DME, Family Training, etc):   Pt has a standard height toilet, walk in shower or tub/shower combo.  She is used to using the walk in shower and does not have any AE in the bathroom.   DME: VIVIEN  Family training: Daughter/family will be present and sleeping over night at times. Continue ongoing family training as appropriate.

## 2023-04-23 NOTE — PLAN OF CARE
Discharge Planner Post-Acute Rehab SLP:     Discharge Plan: Home with 24/7 supervision from family, ongoing speech therapy     Precautions: Disoriented, Mod-severe cognitive impairment, Hmong speaking (family interprets), falls risk, R hemiparesis    Current Status:  Hearing: Mild hearing loss per family report  Vision: WFL  Communication: Patient reports word-finding difficulties but overall able to make wants and needs known  Cognition: Moderate-Severe Cognitive impairment  Swallow: Easy to Chew (7) and Thin Liquids (0). Patient and family indicate that patient prefers softer foods. Will monitor if patient presents with any future difficulties- denied difficulties 04/23    Assessment: Daughter present, acted as . Facilitated daily living situation problem solving task with home and community safety situations, some from stim sheet deferred as likely not culturally relevant or appropriate. Pt provided reasonable response/solution in 75% opportunities.    Other Barriers to Discharge (Family Training, etc): family training

## 2023-04-23 NOTE — PLAN OF CARE
Discharge Planner Post-Acute Rehab PT:      Discharge Plan: home with / family assistance, home PT     Precautions: falls, alarms, R hemiparesis,  Do not leave on commode/EOB alone, cognitive impairment,  Hmong  needed     Current Status: *Dgtr Kayli li to transfer/assist with toileting  Bed Mobility: Min A to right on mat no rail >> key contact at upper shoulder.  Transfer: Min A to the L, mod A R for stand pivot with R AFO. Pam Gudino with RN staff  Gait: up to 40 ft, min A with WBQC and R AFO  Stairs: MAX A   Balance:               Ponce:  on               PASS:  on      Assessment: FES today. See below     Other Barriers to Discharge (DME, Family Training, etc):   Will need AFO, wheelchair and quad cane  Family Training    -------------------------------------------------------------------------------------------------------  Skilled set up on :  Pt dependent for proper placement of electrodes on R quads, hamstrings, anterior tib, and gastroc for optimum muscle contraction, safe positioning on w/c within frame and cushion for prevention of skin breakdown, feet secured to foot pedals, w/c secured to  w/ Q-straints.  Passive motion assessed to ensure proper positioning.       Pt performed 29 minutes of active FES ergometry with 100% stimulation applied to above muscles at 25-35 rpm with 1.99 Nm resistance.  This PT adjusted e-stim and cycling parameters in real-time to ensure palpable muscle contractions throughout session.  Please see [email or web address] for further details on patient's stimulation parameters and ergometry outcomes.       Changes in parameters that were part of this treatment:   -. Education on how to use R/L percentages to cue symmetry, increased speed      Functional outcomes from this intervention include:   -reduced spasticity  -improved sensory awareness and proprioception  -improved muscle strength  -improved motor coordination     Session Summary:    -Average Power: 5.9 W  -Asymmetry: L 3%  -Active Minutes: 27:56mins

## 2023-04-23 NOTE — PLAN OF CARE
FOCUS/GOAL  Medical management    ASSESSMENT, INTERVENTIONS AND CONTINUING PLAN FOR GOAL:  Pt is seen sleeping during rounds and safety check. Daughter took her to the BR sometime during the night. No complain of pain when I saw her awake. Cont w/ POC.  Goal Outcome Evaluation:

## 2023-04-24 ENCOUNTER — APPOINTMENT (OUTPATIENT)
Dept: PHYSICAL THERAPY | Facility: CLINIC | Age: 53
End: 2023-04-24
Attending: PHYSICAL MEDICINE & REHABILITATION
Payer: COMMERCIAL

## 2023-04-24 ENCOUNTER — APPOINTMENT (OUTPATIENT)
Dept: SPEECH THERAPY | Facility: CLINIC | Age: 53
End: 2023-04-24
Attending: PHYSICAL MEDICINE & REHABILITATION
Payer: COMMERCIAL

## 2023-04-24 ENCOUNTER — APPOINTMENT (OUTPATIENT)
Dept: OCCUPATIONAL THERAPY | Facility: CLINIC | Age: 53
End: 2023-04-24
Attending: PHYSICAL MEDICINE & REHABILITATION
Payer: COMMERCIAL

## 2023-04-24 LAB
ANION GAP SERPL CALCULATED.3IONS-SCNC: 12 MMOL/L (ref 7–15)
BUN SERPL-MCNC: 12 MG/DL (ref 6–20)
CALCIUM SERPL-MCNC: 10.2 MG/DL (ref 8.6–10)
CHLORIDE SERPL-SCNC: 102 MMOL/L (ref 98–107)
CREAT SERPL-MCNC: 0.69 MG/DL (ref 0.51–0.95)
DEPRECATED HCO3 PLAS-SCNC: 26 MMOL/L (ref 22–29)
ERYTHROCYTE [DISTWIDTH] IN BLOOD BY AUTOMATED COUNT: 16.2 % (ref 10–15)
GFR SERPL CREATININE-BSD FRML MDRD: >90 ML/MIN/1.73M2
GLUCOSE SERPL-MCNC: 98 MG/DL (ref 70–99)
HCT VFR BLD AUTO: 40.2 % (ref 35–47)
HGB BLD-MCNC: 12.4 G/DL (ref 11.7–15.7)
MCH RBC QN AUTO: 25.9 PG (ref 26.5–33)
MCHC RBC AUTO-ENTMCNC: 30.8 G/DL (ref 31.5–36.5)
MCV RBC AUTO: 84 FL (ref 78–100)
PLATELET # BLD AUTO: 355 10E3/UL (ref 150–450)
POTASSIUM SERPL-SCNC: 4.7 MMOL/L (ref 3.4–5.3)
RBC # BLD AUTO: 4.79 10E6/UL (ref 3.8–5.2)
SODIUM SERPL-SCNC: 140 MMOL/L (ref 136–145)
WBC # BLD AUTO: 10.1 10E3/UL (ref 4–11)

## 2023-04-24 PROCEDURE — 36415 COLL VENOUS BLD VENIPUNCTURE: CPT | Performed by: PHYSICIAN ASSISTANT

## 2023-04-24 PROCEDURE — 99232 SBSQ HOSP IP/OBS MODERATE 35: CPT | Performed by: PHYSICIAN ASSISTANT

## 2023-04-24 PROCEDURE — 97112 NEUROMUSCULAR REEDUCATION: CPT | Mod: GO | Performed by: OCCUPATIONAL THERAPIST

## 2023-04-24 PROCEDURE — 128N000003 HC R&B REHAB

## 2023-04-24 PROCEDURE — 250N000013 HC RX MED GY IP 250 OP 250 PS 637: Performed by: PHYSICIAN ASSISTANT

## 2023-04-24 PROCEDURE — 92507 TX SP LANG VOICE COMM INDIV: CPT | Mod: GN

## 2023-04-24 PROCEDURE — 97750 PHYSICAL PERFORMANCE TEST: CPT | Mod: GP

## 2023-04-24 PROCEDURE — 85027 COMPLETE CBC AUTOMATED: CPT | Performed by: PHYSICIAN ASSISTANT

## 2023-04-24 PROCEDURE — 80048 BASIC METABOLIC PNL TOTAL CA: CPT | Performed by: PHYSICIAN ASSISTANT

## 2023-04-24 PROCEDURE — 97116 GAIT TRAINING THERAPY: CPT | Mod: GP

## 2023-04-24 RX ADMIN — AMLODIPINE BESYLATE 10 MG: 10 TABLET ORAL at 07:56

## 2023-04-24 RX ADMIN — SENNOSIDES AND DOCUSATE SODIUM 2 TABLET: 50; 8.6 TABLET ORAL at 20:14

## 2023-04-24 RX ADMIN — ATORVASTATIN CALCIUM 20 MG: 10 TABLET, FILM COATED ORAL at 20:15

## 2023-04-24 RX ADMIN — LISINOPRIL 20 MG: 20 TABLET ORAL at 07:56

## 2023-04-24 ASSESSMENT — ACTIVITIES OF DAILY LIVING (ADL)
ADLS_ACUITY_SCORE: 40

## 2023-04-24 NOTE — PROGRESS NOTES
Skilled set up on :  Pt dependent for proper placement of electrodes on RUE for optimum muscle contraction, safe positioning on w/c within frame and cushion for prevention of skin breakdown, feet secured to foot pedals, w/c secured to  w/ Q-straints.  Passive motion assessed to ensure proper positioning.       Pt performed 32 minutes of active FES ergometry with 0-100% stimulation applied to above muscles at 25-30 rpm with 0.5-2.05 Nm resistance. This OT adjusted e-stim and cycling parameters in real-time to ensure palpable muscle contractions throughout session.  Please see [email or web address] for further details on patient's stimulation parameters and ergometry outcomes.       Changes in parameters that were part of this treatment:   -no changes made this session     Functional outcomes from this intervention include:   -reduced spasticity  -improved sensory awareness and proprioception  -improved muscle strength  -improved motor coordination

## 2023-04-24 NOTE — PROGRESS NOTES
Postural Assessment for Stroke Scale (PASS)    Maintaining posture -   1) Sitting without support - 3  2) Standing with support (feet position free) - 3  3) Standing without support (feet position free) - 2  4) Standing on nonparetic leg - 1  5) Standing on paretic leg - 0    Changing posture -  6) Rolling supine -> affected side - 3  7) Rolling supine -> unaffected side - 3  8) Sup->sitting edge of bed - 2  9) Sitting edge of bed -> sup - 3  10) Sit->stand without support - 3  11) Stand->sit without support - 3  12) Standing,  pencil from floor without support - 2    Total Score - 28/36    The PASS assesses a patient's ability to change and maintain posture during different balance activities. It is not associated with falls risk, but is used to track progress with the patient's ability to control their posture and balance throughout the course of the patient's admission.    A score of <22 points at admission to Acute Rehab is predictive that pt is not likely to be walking independently at 3 months.   (Vincent et al. 2021. Postural Maintenance Is Associated with Walking Ability in People Received Acute Rehabilitation after Stroke)

## 2023-04-24 NOTE — PLAN OF CARE
Discharge Planner Post-Acute Rehab SLP:     Discharge Plan: Home with 24/7 supervision from family, ongoing speech therapy     Precautions: Disoriented, Mod-severe cognitive impairment, Hmong speaking (family interprets), falls risk, R hemiparesis    Current Status:  Hearing: Mild hearing loss per family report  Vision: WFL  Communication: Patient reports word-finding difficulties but overall able to make wants and needs known  Cognition: Moderate-Severe Cognitive impairment  Swallow: Easy to Chew (7) and Thin Liquids (0). Patient and family indicate that patient prefers softer foods. Will monitor if patient presents with any future difficulties- denied difficulties 04/23    Assessment: Daughter present, acted as . Patient engaged in generative naming tasks.  Patient requiring verbal cues and assistance to generate items consistently.  Reviewed with patient a semantic feature analysis strategy which was helpful and patient was able to show improvement with repetition of the task.    Other Barriers to Discharge (Family Training, etc): family training

## 2023-04-24 NOTE — PROGRESS NOTES
"  [facility]   Acute Rehabilitation Unit  Daily progress note    INTERVAL HISTORY  Weekend and therapy notes reviewed, no acute events reported.    Mercy Osman was seen and examined at bedside this morning with daughter at bedside and  by phone.  She reports to be feeling well overall and feels therapy is going well, right side getting \"a little better\".  Still noting some pain on right side, but again \"a little better\".  She reports to be sleeping well.  She denies any headache, dizziness, nausea, abdominal pain.  Last BM was this morning, has been having multiple on some days, but daughter suspects this is because she was previously constipated and now moving better.  She feels mood is about the same.  Followed up discussion last week regarding anti-depressant trial and health psychology supports.  At this time, patient reports she would like to wait on medication but is agreeable to meeting with psychology team.  Otherwise denies concerns or questions.    Functionally, with SLP, patient engaged in generative naming tasks.  Patient requiring verbal cues and assistance to generate items consistently.  Reviewed with patient a semantic feature analysis strategy which was helpful and patient was able to show improvement with repetition of the task.      MEDICATIONS    amLODIPine  10 mg Oral Daily     atorvastatin  20 mg Oral QPM     lisinopril  20 mg Oral Daily     polyethylene glycol  17 g Oral Daily     senna-docusate  2 tablet Oral At Bedtime        acetaminophen, diclofenac, hydrALAZINE, melatonin     PHYSICAL EXAM  /67 (BP Location: Left arm, Patient Position: Sitting, Cuff Size: Adult Regular)   Pulse 91   Temp 98.5  F (36.9  C) (Oral)   Resp 18   Ht 1.473 m (4' 10\")   Wt 50.5 kg (111 lb 5.3 oz)   SpO2 97%   BMI 23.27 kg/m     Gen: NAD, sitting up in wheelchair  Cardio: appears well-perfused  Pulm: non-labored on room air  Abd: soft, non-tender, " non-distended  Ext: no appreciable edema in bilat lower extremities  Neuro/MSK: awake, alert, PERRL, left upper facial weakness, right lower facial droop, right hemiparesis: RUE SF 1/5, EE 2/5, EF 3/5,  3/5; RLE HF 3/5, KE 4/5, PF 4/5, DF 4/5. Right hemisensory impairment.      LABS  CBC RESULTS:   Recent Labs   Lab Test 04/24/23  0557 04/17/23  0945 04/14/23  0803   WBC 10.1 8.1 7.5   RBC 4.79 5.12 5.44*   HGB 12.4 12.9 13.7   HCT 40.2 42.6 45.1   MCV 84 83 83   MCH 25.9* 25.2* 25.2*   MCHC 30.8* 30.3* 30.4*   RDW 16.2* 16.5* 16.7*    388 340     Last Basic Metabolic Panel:  Recent Labs   Lab Test 04/24/23  0557 04/17/23  0945 04/14/23  0828 04/14/23  0803    139  --  139   POTASSIUM 4.7 3.5  --  4.0   CHLORIDE 102 104  --  106   CO2 26 23  --  20*   ANIONGAP 12 12  --  13   GLC 98 182* 97 107*   BUN 12.0 12.4  --  12.4   CR 0.69 0.71  --  0.76   GFRESTIMATED >90 >90  --  >90   SALAZAR 10.2* 10.0  --  9.6       Rehabilitation - continue comprehensive acute inpatient rehabilitation program with multidisciplinary approach including therapies, rehab nursing, and physiatry following. See interval history for updates.      ASSESSMENT AND PLAN  Mercy Osman is a 52 year old right hand dominant female with a past medical history of hypertension, [place]' palsy who was admitted on 4/8/23 with spontaneous acute left thalamic intracranial hemorrhage with hospital course complicated by hyperlipidemia, prediabetes, elevated troponin, anemia, hypokalemia, and loose stools.  She is now admitted to ARU on 4/14/23 for multidisciplinary rehabilitation and ongoing medical management.        Admission to acute inpatient rehab 4/14/23.    Impairment group code: Stroke Vascular Hemorrhagic 01.2 (R) Body Involvement (L) Brain -  left basal ganglia hemorrhage        1. PT, OT and SLP 60 minutes of each on a daily basis, in addition to rehab nursing and close management of physiatrist.       2. Impairment of ADL's: Noted to have  impaired activity tolerance, impaired balance, impaired coordination, impaired ROM, impaired sensation, impaired strength w/ R hemiparesis, impaired motor control, fatigue, dizziness, and impaired weight shifting, all affecting her ability to safely and independently perform basic ADLs.  Goal for min A or less with basic ADLs.     3. Impairment of mobility:  Noted to have impaired activity tolerance, impaired balance, impaired coordination, impaired ROM, impaired sensation, impaired strength w/ R hemiparesis, impaired motor control, fatigue, dizziness, and impaired weight shifting, all affecting her ability to safely and independently perform basic mobility.  Goal for min A or less with basic mobility.     4. Impairment of cognition/language/swallow:  Noted to have dysarthria and dysphagia, and would benefit from full cognitive-linguistic evaluation at ARU with goals for improved cognitive-linguistic skills to meet basic needs and safe tolerance of least restrictive diet.     5. Medical Conditions  New actions/orders/updates for today are in blue.     Left basal ganglia intraparenchymal hemorrhage, spontaneous, likely secondary to uncontrolled hypertension  Vasogenic cerebral edema  Deficits: right hemiparesis, right facial droop, right hemisensory deficit, right visual neglect, dysarthria, ?aphasia, dysphagia, suspect impaired cognition  - Long-term BP goal 140/90 or less, management as below  - Stroke PLC completed 4/10 (during inpatient stay)  - 4/18 noting some generalized right-sided pain but unable to further describe.  ?central.  Also with significant R shoulder subluxation. Continue PRN Tylenol and added PRN voltaren.  - Continue PT/OT/SLP  - Follow up with neurology 6-8 weeks after your discharge      Hypertension  PTA on amlodipine-benazepril 10-20 mg capsule.  BP elevated to 220s/100s at presentation. Initially managed on nicardipine drip, then transitioned to PO lisinopril + amlodipine.  - Long-term BP  "goal 140/90 or less  - Continue Lisinopril 20mg daily  - Continue amlodipine 10mg daily  - PRN hydralazine for SBP >160  - Monitor BP, adjust regimen as indicated     Hyperlipidemia  Started on statin therapy this admission with .  - Continue atorvastatin 20 mg daily     Troponin elevation, asymptomatic, spontaneously resolved  EKG without obvious acute ischemic changes.  Echo WNL.  No cardiogenic symptoms, suspect transient stress related troponin leak.  Peaked to 113 on 4/10.  Troponin improved spontaneously without intervention.  - Further work-up if any cardiogenic symptoms develop     Hypokalemia, resolved s/p replacement  Hyperchloremia, resolved with adequate hydration    - Trend BMP every Monday.  : K WNL 3.9, Cl   - Replete as needed per protocol     Pre-diabetes   Hgb A1c 6.2% 2023   - Monitor with surveillance with primary care outpatient  - No routine BG checks indicated, monitor periodically with BMP     Anemia, chronic   Hgb 11.5 on presentation.  WNL at 13.4 as of .  - Hgb goal >7, plt goal >50k  - Trend CBC every Monday.  : Hgb WNL 12.4    Major Depressive Disorder, recurrent episode, moderate  SW intake with PHQ-9 score 18, did indicate some passive suicidal ideation.  Per family, have noted some symptoms of depression since patient's son  of suicide ~3 years ago.  - Psychiatry consulted, appreciate assistance.  Met with patient .  Discussed potential medication trial, but patient would like to \"think about it\".  If agreeable, psychiatry recommending Remeron 7.5 mg at HS.  - Also offered health psychology supports, initially declined.    - Revisited with patient today.  She would prefer to hold off on medication trial, but is now open to psychology, will consult.       6. Adjustment to disability:  Clinical psychology to eval and treat if indicated  7. FEN: easy to chew (level 7) diet, thin liquids  8. Bowel: continent/incontinent, monitor, PRN and scheduled " bowel meds available  9. Bladder: continent/incontinent  10. DVT Prophylaxis: mechanical  11. GI Prophylaxis: not indicated  12. Code: full  13. Disposition: goal for home with family support  14. ELOS:  target 5/5/23  15. Follow up Appointments on Discharge: PCP in 1-2 weeks, neurology in 6-8 weeks after discharge      Patient was discussed with Dr. Mariah Gomez, PM&R staff physician     Fabi Virgen PA-C  Physical Medicine & Rehabilitation

## 2023-04-24 NOTE — PLAN OF CARE
Goal Outcome Evaluation:      Plan of Care Reviewed With: patient    Overall Patient Progress: no change    Outcome Evaluation: No change in patient progress this shift.    Orientation: A/Ox4. Hmong speaking. Interpreting provided via daughter in room per patient preference   Bowel: Continent of bowel using toilet in bathroom. LBM this morning per patient and patient's daughter report  Bladder: Continent of bladder using toilet in bathroom  Pain: Denies pain  Ambulation/Transfers: Ax1 with Pam Gudino for transfers,  based for nursing. Patient's daughter approved for Ax1 SPT transfers   Diet/ Liquids: Tolerating diet well with good appetite. Encouraged fluids this shift  Bed and chair alarms on for safety, call light within reach. Continue with POC.

## 2023-04-24 NOTE — PROGRESS NOTES
04/24/23 0800   Signing Clinician's Name / Credentials   Signing clinician's name / credentials Manolo Chevykvng DPT   Ponce Balance Scale (KVNG ROMAN, SONIA MACEDO, LUDY VARELA, CHEN ROSS: MEASURING BALANCE IN THE ELDERLY: VALIDATION OF AN INSTRUMENT. CAN. J. PUB. HEALTH, JULY/AUGUST SUPPLEMENT 2:S7-11, 1992.)   Sit To Stand 2   Standing Unsupported 3   Sitting Unsupported 4   Stand to Sit 3   Transfers 1   Standing with Eyes Closed 3   Standing Unsupported, Feet Together 0   Reach Forward With Outstretched Arm 1   Retrieve Object From Floor 1   Turning to Look Behind 0   Turn 360 Degrees 0   Placing Alternate Foot on Stool (4-6 inches) 1   Unsupported Tandem Stand (Demonstrate to Subject) 0   One Leg Stand 1   Total Score (A score of 45 or less has been correlated with an increased risk of falls)   Total Score (out of 56) 20     Ponce Balance Scale (BBS) Cutoff Scores: A score of < 45 /56 indicates an increased risk for falls.     The BBS is a measure of static and dynamic standing balance that has been validated in community dwelling elderly individuals and individuals who have Parkinson's Disease, MS, and those who are s/p CVA and TBI. The test is administered without an assistive device. Scores from the Ponce are used to determine the probability of falling based on the patient's previous history of falls and their test performance.     Minimal Detectable Change = 6.5   & Minimal Detectable Change (Parkinson's Disease) = 5 according to Og & Dustiney 2008    Assessment (rationale for performing, application to patient s function & care plan): Remains high fall risk. Up from 7 previous week.  (Minutes billed as physical performance test)

## 2023-04-24 NOTE — PLAN OF CARE
FOCUS/GOAL  Bowel management, Bladder management, Mobility, Skin integrity, and Safety management    ASSESSMENT, INTERVENTIONS AND CONTINUING PLAN FOR GOAL:  Patient is alert and oriented. Patient is Hmong speaking, daughter present in room helping with interpretation and transfers as needed. Has been given green light from PT. Is assist of x 1 spv w/c based. Patient is continent of B/B. No BM on shift. Patient ate 100% of her dinner. Patient denies pain. Patient will also spend a night with the patient. Nursing staff will continue with poc.  Goal Outcome Evaluation:

## 2023-04-24 NOTE — PROGRESS NOTES
"Discharge Planner Post-Acute Rehab OT:      Discharge Plan: Addendum: Home w/ home OT w/ family and 24/7 A     Precautions: Fall, R mir, *Dgtr Kayli li to transfer/assist with toileting  Hmong speaking patient     Bedside Nursing to perform the following precautions for RUE shoulder subluxation:       No twisting, pulling, or lifting UE above 90 degrees of flexion or \"shoulder height\".    Support UE at all times to prevent further subluxation.    Position UE on arm tray when seated in wheelchair and on pillows when seated or lying in bed. Position UE in a neutral position.    Remove resting hand splint in morning; refer to splint orders for details    Avoid using hemiparetic UE for BP readings and IV/PICC placement when medically appropriate.     Current Status:  ADLs:    Mobility: W/C based. min A SPT toward pt's L side, mod A to the right. Nsg roxane stedy x2.    Grooming: Set-up and SBA seated.     Dressing: UB min A, mod A with LBD tasks and footwear.    Bathing: Pt used the rolling shower chair but did SPT toward her L Min A. Pt bathed 5/10 body parts seated.     Toileting: Nsg roxane stedy x2. Min-mod A with W/C<>toilet squat pivot transfer with therapy with blocking of RLE.   IADLs: TBD. Supportive family  Vision/Cognition: Vision appears functional, screen as appropriate. Pt is disoriented, impaired memory, impaired attention, impaired speech/communication, aphasia possibly and variably impulsive- responds well to safety cues.      Assessment: Utilized FES for RUE neuro re-education and sensorimotor skills; refer to care plan note for further details.      Other Barriers to Discharge (DME, Family Training, etc):   Pt has a standard height toilet, walk in shower or tub/shower combo.  She is used to using the walk in shower and does not have any AE in the bathroom.   DME: TBD  Family training: Daughter/family will be present and sleeping over night at times. Continue ongoing family training as appropriate.  "

## 2023-04-24 NOTE — PLAN OF CARE
FOCUS/GOAL  Medical management    ASSESSMENT, INTERVENTIONS AND CONTINUING PLAN FOR GOAL:  Pt is seen sleeping during shift change and safety round checks. Daughter is present. Per daughter, she took pt to the BR for toileting. Cont B/Bm. No complain of pain. Cont w/ POC.  Goal Outcome Evaluation:

## 2023-04-24 NOTE — PROGRESS NOTES
"  [facility]   Acute Rehabilitation Unit  Daily progress note    INTERVAL HISTORY  Mercy Osman was seen and examined prior to start of afternoon therapy session.  No acute events overnight.  Denies chest pain, shortness of breath, no fever or chills.  Good participation with therapy, hopefull for discharge soon.     Functional  PT:  Bed Mobility: Min A to right on mat no rail >> key contact at upper shoulder.  Transfer: Min A to the L, mod A R for stand pivot with R AFO. Pam Gudino with RN staff  Gait: up to 40 ft, min A with WBQC and R AFO  Stairs: MAX A   Balance:               Ponce: 7/56 on               PASS: 1336 on       ROS: 10 point ROS neg other than the symptoms noted above in the HPI.        MEDICATIONS    amLODIPine  10 mg Oral Daily     atorvastatin  20 mg Oral QPM     lisinopril  20 mg Oral Daily     polyethylene glycol  17 g Oral Daily     senna-docusate  2 tablet Oral At Bedtime        acetaminophen, diclofenac, hydrALAZINE, melatonin     PHYSICAL EXAM  /80 (BP Location: Left arm)   Pulse 85   Temp 98.5  F (36.9  C) (Oral)   Resp 18   Ht 1.473 m (4' 10\")   Wt 50.5 kg (111 lb 5.3 oz)   SpO2 97%   BMI 23.27 kg/m     Gen: NAD, in WC.  Cardio: appears well-perfused  Pulm: non-labored on room air  Abd: soft, non-tender, non-distended  Ext: no appreciable edema in bilat lower extremities, improved right hand edema today  Neuro/MSK: awake, alert, PERRL, left upper facial weakness, right lower facial droop, right hemiparesis, has 3- EF now. right hemisensory impairment      LABS  CBC RESULTS:   Recent Labs   Lab Test 23  0945 23  0803 23  0813   WBC 8.1 7.5 8.7   RBC 5.12 5.44* 5.32*   HGB 12.9 13.7 13.4   HCT 42.6 45.1 44.0   MCV 83 83 83   MCH 25.2* 25.2* 25.2*   MCHC 30.3* 30.4* 30.5*   RDW 16.5* 16.7* 16.9*    340 345     Last Basic Metabolic Panel:  Recent Labs   Lab Test 23  0945 23  0828 23  0803 " 04/13/23  0842 04/13/23  0813     --  139  --  139   POTASSIUM 3.5  --  4.0  --  4.0  4.0   CHLORIDE 104  --  106  --  105   CO2 23  --  20*  --  19*   ANIONGAP 12  --  13  --  15   * 97 107*   < > 99   BUN 12.4  --  12.4  --  13.3   CR 0.71  --  0.76  --  0.72   GFRESTIMATED >90  --  >90  --  >90   SALAZAR 10.0  --  9.6  --  9.6    < > = values in this interval not displayed.       Rehabilitation - continue comprehensive acute inpatient rehabilitation program with multidisciplinary approach including therapies, rehab nursing, and physiatry following. See interval history for updates.      ASSESSMENT AND PLAN  Mercy Osman is a 52 year old right hand dominant female with a past medical history of hypertension, [place]ins' palsy who was admitted on 4/8/23 with spontaneous acute left thalamic intracranial hemorrhage with hospital course complicated by hyperlipidemia, prediabetes, elevated troponin, anemia, hypokalemia, and loose stools.  She is now admitted to ARU on 4/14/23 for multidisciplinary rehabilitation and ongoing medical management.        Admission to acute inpatient rehab 4/14/23.    Impairment group code: Stroke Vascular Hemorrhagic 01.2 (R) Body Involvement (L) Brain -  left basal ganglia hemorrhage        1. PT, OT and SLP 60 minutes of each on a daily basis, in addition to rehab nursing and close management of physiatrist.       2. Impairment of ADL's: Noted to have impaired activity tolerance, impaired balance, impaired coordination, impaired ROM, impaired sensation, impaired strength w/ R hemiparesis, impaired motor control, fatigue, dizziness, and impaired weight shifting, all affecting her ability to safely and independently perform basic ADLs.  Goal for min A or less with basic ADLs.     3. Impairment of mobility:  Noted to have impaired activity tolerance, impaired balance, impaired coordination, impaired ROM, impaired sensation, impaired strength w/ R hemiparesis, impaired motor control,  fatigue, dizziness, and impaired weight shifting, all affecting her ability to safely and independently perform basic mobility.  Goal for min A or less with basic mobility.     4. Impairment of cognition/language/swallow:  Noted to have dysarthria and dysphagia, and would benefit from full cognitive-linguistic evaluation at ARU with goals for improved cognitive-linguistic skills to meet basic needs and safe tolerance of least restrictive diet.     5. Medical Conditions  New actions/orders/updates for today are in blue.     Left basal ganglia intraparenchymal hemorrhage, spontaneous, likely secondary to uncontrolled hypertension  Vasogenic cerebral edema  Deficits: right hemiparesis, right facial droop, right hemisensory deficit, right visual neglect, dysarthria, ?aphasia, dysphagia, suspect impaired cognition  - Long-term BP goal 140/90 or less, management as below  - Stroke PLC completed 4/10 (during inpatient stay)  - 4/18 noting some generalized right-sided pain but unable to further describe.  ?central.  Also with significant R shoulder subluxation. Continue PRN Tylenol and added PRN voltaren.  - Continue PT/OT/SLP  - Follow up with neurology 6-8 weeks after your discharge      Hypertension  PTA on amlodipine-benazepril 10-20 mg capsule.  BP elevated to 220s/100s at presentation.  Initially managed on nicardipine drip, then transitioned to PO lisinopril + amlodipine.  - Long-term BP goal 140/90 or less  - Continue Lisinopril 20mg daily  - Continue amlodipine 10mg daily  - PRN hydralazine for SBP >160  - Monitor BP, adjust regimen as indicated     Hyperlipidemia  Started on statin therapy this admission with .  - Continue atorvastatin 20 mg daily     Troponin elevation, asymptomatic, spontaneously resolved  EKG without obvious acute ischemic changes.  Echo WNL.  No cardiogenic symptoms, suspect transient stress related troponin leak.  Peaked to 113 on 4/10.  Troponin improved spontaneously without  "intervention.  - Further work-up if any cardiogenic symptoms develop     Hypokalemia, resolved s/p replacement  Hyperchloremia, resolved with adequate hydration   - Trend BMP every Monday.  : K WNL 3.5, Cl   - Replete as needed per protocol     Pre-diabetes   Hgb A1c 6.2% 2023   - Monitor with surveillance with primary care outpatient  - No routine BG checks indicated, monitor periodically with BMP     Anemia, chronic   Hgb 11.5 on presentation.  WNL at 13.4 as of .  - Hgb goal >7, plt goal >50k  - Trend CBC every Monday.  : Hgb WNL 12.9    Major Depressive Disorder, recurrent episode, moderate  SW intake with PHQ-9 score 18, did indicate some passive suicidal ideation.  Per family, have noted some symptoms of depression since patient's son  of suicide ~3 years ago.  - Psychiatry consulted over weekend, appreciate assistance.  Met with patient today.  Discussed potential medication trial, but patient would like to \"think about it\".  If agreeable, psychiatry recommending Remeron 7.5 mg at HS.  - Also offered health psychology supports.  Again patient notes she would like to think about it for a few days and let us know.  Will plan to revisit.       6. Adjustment to disability:  Clinical psychology to eval and treat if indicated  7. FEN: easy to chew (level 7) diet, thin liquids  8. Bowel: continent/incontinent, monitor, PRN and scheduled bowel meds available  9. Bladder: continent/incontinent  10. DVT Prophylaxis: mechanical  11. GI Prophylaxis: not indicated  12. Code: full  13. Disposition: goal for home with family support  14. ELOS:  target 23  15. Follow up Appointments on Discharge: PCP in 1-2 weeks, neurology in 6-8 weeks after discharge        Adryan Zambrano, DO          I spent a total of 25 minutes face to face and coordinating care of Mercy Osman. Over 50% of my time on the unit was spent counseling the patient and /or coordinating care regarding post CVA.      "

## 2023-04-25 ENCOUNTER — APPOINTMENT (OUTPATIENT)
Dept: SPEECH THERAPY | Facility: CLINIC | Age: 53
End: 2023-04-25
Attending: PHYSICAL MEDICINE & REHABILITATION
Payer: COMMERCIAL

## 2023-04-25 ENCOUNTER — APPOINTMENT (OUTPATIENT)
Dept: OCCUPATIONAL THERAPY | Facility: CLINIC | Age: 53
End: 2023-04-25
Attending: PHYSICAL MEDICINE & REHABILITATION
Payer: COMMERCIAL

## 2023-04-25 ENCOUNTER — APPOINTMENT (OUTPATIENT)
Dept: PHYSICAL THERAPY | Facility: CLINIC | Age: 53
End: 2023-04-25
Attending: PHYSICAL MEDICINE & REHABILITATION
Payer: COMMERCIAL

## 2023-04-25 PROCEDURE — 128N000003 HC R&B REHAB

## 2023-04-25 PROCEDURE — 99231 SBSQ HOSP IP/OBS SF/LOW 25: CPT | Mod: FS | Performed by: PHYSICIAN ASSISTANT

## 2023-04-25 PROCEDURE — 92507 TX SP LANG VOICE COMM INDIV: CPT | Mod: GN

## 2023-04-25 PROCEDURE — 250N000013 HC RX MED GY IP 250 OP 250 PS 637: Performed by: PHYSICIAN ASSISTANT

## 2023-04-25 PROCEDURE — 97112 NEUROMUSCULAR REEDUCATION: CPT | Mod: GO | Performed by: OCCUPATIONAL THERAPIST

## 2023-04-25 PROCEDURE — 97112 NEUROMUSCULAR REEDUCATION: CPT | Mod: GP

## 2023-04-25 RX ADMIN — SENNOSIDES AND DOCUSATE SODIUM 2 TABLET: 50; 8.6 TABLET ORAL at 20:15

## 2023-04-25 RX ADMIN — LISINOPRIL 20 MG: 20 TABLET ORAL at 07:55

## 2023-04-25 RX ADMIN — ACETAMINOPHEN 325MG 650 MG: 325 TABLET ORAL at 17:23

## 2023-04-25 RX ADMIN — AMLODIPINE BESYLATE 10 MG: 10 TABLET ORAL at 07:55

## 2023-04-25 RX ADMIN — ATORVASTATIN CALCIUM 20 MG: 10 TABLET, FILM COATED ORAL at 20:15

## 2023-04-25 ASSESSMENT — ACTIVITIES OF DAILY LIVING (ADL)
ADLS_ACUITY_SCORE: 40

## 2023-04-25 NOTE — PLAN OF CARE
FOCUS/GOAL  Bowel management, Bladder management, Pain management, Mobility, Skin integrity, and Safety management    ASSESSMENT, INTERVENTIONS AND CONTINUING PLAN FOR GOAL:  Patient is alert and oriented x 4. Is Hmong speaking. Daughter always here and always helps with calling for the patient. Interpreting provided via daughter in room per patient preference. Continent of bowel using toilet in bathroom. LBM 4/24/23 per patient and patient's daughter report. Continent of B/B. Denies pain. Is assist of x1 with Pam Gudino for transfers,  based for nursing. Patient's daughter approved for Ax1 SPT transfers. On regular diet with liquids and has good appetite.      Goal Outcome Evaluation:

## 2023-04-25 NOTE — PLAN OF CARE
FOCUS/GOAL  Bowel management, Bladder management, Pain management, Mobility, Skin integrity, and Safety management    ASSESSMENT, INTERVENTIONS AND CONTINUING PLAN FOR GOAL:  Patient slept well through the night. Is Hmong speaking. Daughter always here and always helps with calling for the patient. Interpreting services is provided by daughter in room per patient preference. Continent of bowel using toilet in bathroom. LBM 4/24/23 per patient and patient's daughter report. Is continent of B/B. Denies pain. Is assist of x1 with Pam Gudino for transfers,  based for nursing. Patient's daughter approved for Ax1 SPT transfers. Nursing staff staff will continue with poc.  Goal Outcome Evaluation:

## 2023-04-25 NOTE — PROGRESS NOTES
XCite stim unit for Activity Based Therapy. Skilled set up; determined appropriate FES parameters for each muscle group based off of strong tetanic response of muscle test.  Used the following activities from the software library: Hand library  Intervention and patient response: Pt completed 50 repetitions x2 sets with grasp/release and lumbrical grasp with writer guiding through transitional movements.

## 2023-04-25 NOTE — PLAN OF CARE
Discharge Planner Post-Acute Rehab PT:      Discharge Plan: home with  family assistance, home PT     Precautions: falls, alarms, R hemiparesis,  Do not leave on commode/EOB alone, cognitive impairment,  Hmong  needed     Current Status: *Dgtr Kayli li to transfer/assist with toileting  Bed Mobility: Min-A  Transfer: Min-A pivot B direction  Gait: up to 40 ft, mod-A with WBQC and R AFO  Stairs: max-A single rail     Ponce:  on  on   PASS:  on  on      Assessment: FES today. See below     Other Barriers to Discharge (DME, Family Training, etc):   Will need AFO, wheelchair and quad cane  Family Training    -------------------------------------------------------------------------------------------------------  Skilled set up on :  Pt dependent for proper placement of electrodes on R quads, hamstrings, anterior tib, and gastroc for optimum muscle contraction, safe positioning on w/c within frame and cushion for prevention of skin breakdown, feet secured to foot pedals, w/c secured to  w/ Q-straints.  Passive motion assessed to ensure proper positioning.       Pt performed 34:09 minutes of active FES ergometry with 100% stimulation applied to above muscles at 35-45 rpm with 1.99 Nm resistance.  This PT adjusted e-stim and cycling parameters in real-time to ensure palpable muscle contractions throughout session.  Please see [email or web address] for further details on patient's stimulation parameters and ergometry outcomes.       Changes in parameters that were part of this treatment:   - None. Cueing to increase RLE use and to maintain effort     Functional outcomes from this intervention include:   -reduced spasticity  -improved sensory awareness and proprioception  -improved muscle strength  -improved motor coordination     Session Summary:   -Average Power: 6.8 W  -Asymmetry: L 12%  -Active Minutes: 31:30 mins

## 2023-04-25 NOTE — PLAN OF CARE
Goal Outcome Evaluation:      Plan of Care Reviewed With: patient, child    Outcome Evaluation: Pt's vitals stable. Denied pain. Right side still weak. Pt continent of bowel and bladder using the toilet. Pt's daughter helping her with cares and able to transfer her with SPT. Tolerating current diet.

## 2023-04-25 NOTE — PROGRESS NOTES
"Discharge Planner Post-Acute Rehab OT:      Discharge Plan: Addendum: Home w/ home OT w/ family and 24/7 A     Precautions: Fall, R mir, *Dgtr Kayli li to transfer/assist with toileting  Hmong speaking patient     Bedside Nursing to perform the following precautions for RUE shoulder subluxation:       No twisting, pulling, or lifting UE above 90 degrees of flexion or \"shoulder height\".    Support UE at all times to prevent further subluxation.    Position UE on arm tray when seated in wheelchair and on pillows when seated or lying in bed. Position UE in a neutral position.    Remove resting hand splint in morning; refer to splint orders for details    Avoid using hemiparetic UE for BP readings and IV/PICC placement when medically appropriate.     Current Status:  ADLs:    Mobility: W/C based. min A SPT toward pt's L side, mod A to the right. Nsg roxane stedy x2.    Grooming: Set-up and SBA seated.     Dressing: UB min A, mod A with LBD tasks and footwear.    Bathing: Pt used the rolling shower chair but did SPT toward her L Min A. Pt bathed 5/10 body parts seated.     Toileting: Nsg roxane stedy x2. Min-mod A with W/C<>toilet squat pivot transfer with therapy with blocking of RLE.   IADLs: TBD. Supportive family  Vision/Cognition: Vision appears functional, screen as appropriate. Pt is disoriented, impaired memory, impaired attention, impaired speech/communication, aphasia possibly and variably impulsive- responds well to safety cues.      Assessment: Focused on proximal shoulder strengthening and isometric strengthening exercises for RUE. Pt presenting with decreased shoulder subluxation ~1 finger width compared to 2 finger width week prior. Utilized Xcite for RUE neuro re-education and sensorimotor skills with hand library; refer to care plan note for further details.      Other Barriers to Discharge (DME, Family Training, etc):   Pt has a standard height toilet, walk in shower or tub/shower combo.  She is used to " using the walk in shower and does not have any AE in the bathroom.   DME: TBD  Family training: Daughter/family will be present and sleeping over night at times. Continue ongoing family training as appropriate.

## 2023-04-25 NOTE — PLAN OF CARE
Discharge Planner Post-Acute Rehab SLP:     Discharge Plan: Home with 24/7 supervision from family, ongoing speech therapy     Precautions: Disoriented, Mod-severe cognitive impairment, Hmong speaking (family interprets), falls risk, R hemiparesis    Current Status:  Hearing: Mild hearing loss per family report  Vision: WFL  Communication: Mild to moderate word finding difficulties  Cognition: Moderate-Severe Cognitive impairment  Swallow: Easy to Chew (7) and Thin Liquids (0). Patient and family indicate that patient prefers softer foods. Will monitor if patient presents with any future difficulties- denied difficulties 04/23    Assessment: Daughter present and providing interpretation.  Patient engaged in task requiring her to decide similarities and differences between 2 objects.  Patient consistently focusing on just the differences in items and requiring cues to attend to how items are similar.  Patient did show improvement with repetition in the task but still requiring assistance to generate similarities.  When provided with moderate verbal cues, was successful    Other Barriers to Discharge (Family Training, etc): family training

## 2023-04-25 NOTE — PROGRESS NOTES
"  [facility]   Acute Rehabilitation Unit  Daily progress note    INTERVAL HISTORY  Mercy Osman was seen and examined at bedside this morning during OT session with daughter.  No acute events reported overnight.  Patient reports to be feeling well overall. She notes minimal pain now on right side.  Reports to be sleeping well.  Denies other changes.  Reviewed that psychology will be meeting with her in coming days for coping/adjustment, she is agreeable.  OT noting some return in RUE, daughter has been assisting patient with NMES multiple times per day, which seems effective.  Patient and daughter deny other questions or concerns at this time.    Functionally, she is currently transferring with roxane ramirez and Ax2 with nursing and pivoting with min to mod A with therapies.  She needs set-up and SBA for seated grooming, min A for upper body dressing, mod A for lower body.      MEDICATIONS    amLODIPine  10 mg Oral Daily     atorvastatin  20 mg Oral QPM     lisinopril  20 mg Oral Daily     polyethylene glycol  17 g Oral Daily     senna-docusate  2 tablet Oral At Bedtime        acetaminophen, diclofenac, hydrALAZINE, melatonin     PHYSICAL EXAM  /78 (BP Location: Left arm, Patient Position: Chair, Cuff Size: Adult Regular)   Pulse 88   Temp 98.7  F (37.1  C) (Oral)   Resp 18   Ht 1.473 m (4' 10\")   Wt 50.5 kg (111 lb 5.3 oz)   SpO2 98%   BMI 23.27 kg/m     Gen: NAD  Cardio: appears well-perfused  Pulm: non-labored on room air  Abd: soft, non-tender, non-distended  Ext: no appreciable edema in bilat lower extremities  Neuro/MSK: awake, alert, PERRL, left upper facial weakness, right lower facial droop, right hemiparesis      LABS  CBC RESULTS:   Recent Labs   Lab Test 04/24/23  0557 04/17/23  0945 04/14/23  0803   WBC 10.1 8.1 7.5   RBC 4.79 5.12 5.44*   HGB 12.4 12.9 13.7   HCT 40.2 42.6 45.1   MCV 84 83 83   MCH 25.9* 25.2* 25.2*   MCHC 30.8* 30.3* 30.4*   RDW 16.2* 16.5* " 16.7*    388 340     Last Basic Metabolic Panel:  Recent Labs   Lab Test 04/24/23  0557 04/17/23  0945 04/14/23  0828 04/14/23  0803    139  --  139   POTASSIUM 4.7 3.5  --  4.0   CHLORIDE 102 104  --  106   CO2 26 23  --  20*   ANIONGAP 12 12  --  13   GLC 98 182* 97 107*   BUN 12.0 12.4  --  12.4   CR 0.69 0.71  --  0.76   GFRESTIMATED >90 >90  --  >90   SALAZAR 10.2* 10.0  --  9.6       Rehabilitation - continue comprehensive acute inpatient rehabilitation program with multidisciplinary approach including therapies, rehab nursing, and physiatry following. See interval history for updates.      ASSESSMENT AND PLAN  Mercy Osman is a 52 year old right hand dominant female with a past medical history of hypertension, [place]' palsy who was admitted on 4/8/23 with spontaneous acute left thalamic intracranial hemorrhage with hospital course complicated by hyperlipidemia, prediabetes, elevated troponin, anemia, hypokalemia, and loose stools.  She is now admitted to ARU on 4/14/23 for multidisciplinary rehabilitation and ongoing medical management.        Admission to acute inpatient rehab 4/14/23.    Impairment group code: Stroke Vascular Hemorrhagic 01.2 (R) Body Involvement (L) Brain -  left basal ganglia hemorrhage        1. PT, OT and SLP 60 minutes of each on a daily basis, in addition to rehab nursing and close management of physiatrist.       2. Impairment of ADL's: Noted to have impaired activity tolerance, impaired balance, impaired coordination, impaired ROM, impaired sensation, impaired strength w/ R hemiparesis, impaired motor control, fatigue, dizziness, and impaired weight shifting, all affecting her ability to safely and independently perform basic ADLs.  Goal for min A or less with basic ADLs.     3. Impairment of mobility:  Noted to have impaired activity tolerance, impaired balance, impaired coordination, impaired ROM, impaired sensation, impaired strength w/ R hemiparesis, impaired motor  control, fatigue, dizziness, and impaired weight shifting, all affecting her ability to safely and independently perform basic mobility.  Goal for min A or less with basic mobility.     4. Impairment of cognition/language/swallow:  Noted to have dysarthria and dysphagia, and would benefit from full cognitive-linguistic evaluation at ARU with goals for improved cognitive-linguistic skills to meet basic needs and safe tolerance of least restrictive diet.     5. Medical Conditions  New actions/orders/updates for today are in blue.     Left basal ganglia intraparenchymal hemorrhage, spontaneous, likely secondary to uncontrolled hypertension  Vasogenic cerebral edema  Deficits: right hemiparesis, right facial droop, right hemisensory deficit, right visual neglect, dysarthria, ?aphasia, dysphagia, suspect impaired cognition  - Long-term BP goal 140/90 or less, management as below  - Stroke PLC completed 4/10 (during inpatient stay)  - 4/18 noting some generalized right-sided pain but unable to further describe.  ?central.  Also with significant R shoulder subluxation. Continue PRN Tylenol and added PRN voltaren.  - Continue PT/OT/SLP  - Follow up with neurology 6-8 weeks after your discharge      Hypertension  PTA on amlodipine-benazepril 10-20 mg capsule.  BP elevated to 220s/100s at presentation. Initially managed on nicardipine drip, then transitioned to PO lisinopril + amlodipine.  - Long-term BP goal 140/90 or less  - Continue Lisinopril 20mg daily  - Continue amlodipine 10mg daily  - PRN hydralazine for SBP >160  - Monitor BP, adjust regimen as indicated     Hyperlipidemia  Started on statin therapy this admission with .  - Continue atorvastatin 20 mg daily     Troponin elevation, asymptomatic, spontaneously resolved  EKG without obvious acute ischemic changes.  Echo WNL.  No cardiogenic symptoms, suspect transient stress related troponin leak.  Peaked to 113 on 4/10.  Troponin improved spontaneously without  "intervention.  - Further work-up if any cardiogenic symptoms develop     Hypokalemia, resolved s/p replacement  Hyperchloremia, resolved with adequate hydration    - Trend BMP every Monday.  : K WNL 3.9, Cl   - Replete as needed per protocol     Pre-diabetes   Hgb A1c 6.2% 2023   - Monitor with surveillance with primary care outpatient  - No routine BG checks indicated, monitor periodically with BMP     Anemia, chronic   Hgb 11.5 on presentation.  WNL at 13.4 as of .  - Hgb goal >7, plt goal >50k  - Trend CBC every Monday.  : Hgb WNL 12.4    Major Depressive Disorder, recurrent episode, moderate  SW intake with PHQ-9 score 18, did indicate some passive suicidal ideation.  Per family, have noted some symptoms of depression since patient's son  of suicide ~3 years ago.  - Psychiatry consulted, appreciate assistance.  Met with patient .  Discussed potential medication trial, but patient would like to \"think about it\".  If agreeable, psychiatry recommending Remeron 7.5 mg at HS.  - Also offered health psychology supports, initially declined.    - Revisited with patient .  She would prefer to hold off on medication trial, but is now open to psychology, will consult.       6. Adjustment to disability:  Clinical psychology to eval and treat if indicated  7. FEN: easy to chew (level 7) diet, thin liquids  8. Bowel: continent/incontinent, monitor, PRN and scheduled bowel meds available  9. Bladder: continent/incontinent  10. DVT Prophylaxis: mechanical  11. GI Prophylaxis: not indicated  12. Code: full  13. Disposition: goal for home with family support  14. ELOS:  target 23  15. Follow up Appointments on Discharge: PCP in 1-2 weeks, neurology in 6-8 weeks after discharge      Patient was seen and discussed with Dr. Yulissa Rondon, PM&R staff physician     Fabi Virgen PA-C  Physical Medicine & Rehabilitation      Physician Attestation     I saw and evaluated Mercy Osman as part of a " shared APRN/PA visit.     I personally reviewed the vital signs, medications and labs.    I personally performed the substantive portion of the medical decision making for this visit - please see the LIZETTE's documentation for full details.    Key management decisions made by me and carried out under my direction:   She is overall doing very well with intensive inpatient therapy is an excellent support from her daughter.  Very pleased with her progress functionally as well as improvement of right upper extremity weakness.  Anticipate another 10 days on the inpatient unit with potential discharge date of May 5.  She will discharge to home with home therapies and support of her family.    Yulissa Rondon MD  Date of Service (when I saw the patient): 04/25/23

## 2023-04-25 NOTE — PROGRESS NOTES
Anticipate insurance will be a barrier to finding HC. In the meantime, referral for PT/OT/SLP sent to [facility] and pending. Will remain available and continue to follow.     ADDENDUM: [facility] declined. [facility] referral sent and later declined. Need to send additional referrals vs plan for OP.     Sylvia Turner [facility]   Direct Phone: [phone]  I   Pager: [phone]  I  Fax: [phone]

## 2023-04-26 ENCOUNTER — APPOINTMENT (OUTPATIENT)
Dept: OCCUPATIONAL THERAPY | Facility: CLINIC | Age: 53
End: 2023-04-26
Attending: PHYSICAL MEDICINE & REHABILITATION
Payer: COMMERCIAL

## 2023-04-26 ENCOUNTER — APPOINTMENT (OUTPATIENT)
Dept: PHYSICAL THERAPY | Facility: CLINIC | Age: 53
End: 2023-04-26
Attending: PHYSICAL MEDICINE & REHABILITATION
Payer: COMMERCIAL

## 2023-04-26 ENCOUNTER — APPOINTMENT (OUTPATIENT)
Dept: SPEECH THERAPY | Facility: CLINIC | Age: 53
End: 2023-04-26
Attending: PHYSICAL MEDICINE & REHABILITATION
Payer: COMMERCIAL

## 2023-04-26 PROCEDURE — 999N000125 HC STATISTIC PATIENT MED CONFERENCE < 30 MIN: Performed by: OCCUPATIONAL THERAPIST

## 2023-04-26 PROCEDURE — 128N000003 HC R&B REHAB

## 2023-04-26 PROCEDURE — 999N000150 HC STATISTIC PT MED CONFERENCE < 30 MIN

## 2023-04-26 PROCEDURE — 92507 TX SP LANG VOICE COMM INDIV: CPT | Mod: GN

## 2023-04-26 PROCEDURE — 97116 GAIT TRAINING THERAPY: CPT | Mod: GP | Performed by: REHABILITATION PRACTITIONER

## 2023-04-26 PROCEDURE — 97112 NEUROMUSCULAR REEDUCATION: CPT | Mod: GO | Performed by: OCCUPATIONAL THERAPIST

## 2023-04-26 PROCEDURE — 250N000013 HC RX MED GY IP 250 OP 250 PS 637: Performed by: PHYSICIAN ASSISTANT

## 2023-04-26 PROCEDURE — 92507 TX SP LANG VOICE COMM INDIV: CPT | Mod: GN | Performed by: SPEECH-LANGUAGE PATHOLOGIST

## 2023-04-26 PROCEDURE — 99232 SBSQ HOSP IP/OBS MODERATE 35: CPT | Mod: FS | Performed by: PHYSICIAN ASSISTANT

## 2023-04-26 PROCEDURE — 999N000125 HC STATISTIC PATIENT MED CONFERENCE < 30 MIN

## 2023-04-26 PROCEDURE — 97110 THERAPEUTIC EXERCISES: CPT | Mod: GP | Performed by: REHABILITATION PRACTITIONER

## 2023-04-26 RX ADMIN — ATORVASTATIN CALCIUM 20 MG: 10 TABLET, FILM COATED ORAL at 21:06

## 2023-04-26 RX ADMIN — SENNOSIDES AND DOCUSATE SODIUM 2 TABLET: 50; 8.6 TABLET ORAL at 21:06

## 2023-04-26 ASSESSMENT — ACTIVITIES OF DAILY LIVING (ADL)
ADLS_ACUITY_SCORE: 40

## 2023-04-26 NOTE — PROGRESS NOTES
Team rounds this morning. On track to discharge home Fri 05/05/23 with 24/7 support. Plan for OP. Insurance barrier to getting HC set up. Will talk to pt and pt family about MNchoices assessment at time of discharge with [facility] and continue to follow.     RUSSELL Moore   [facility]   Direct Phone: [phone]  I   Pager: [phone]  I  Fax: [phone]

## 2023-04-26 NOTE — PROGRESS NOTES
CLINICAL NUTRITION SERVICES - REASSESSMENT NOTE     Nutrition Prescription    Malnutrition Status:    Patient does not meet two of the established criteria necessary for diagnosing malnutrition    Recommendations already ordered by Registered Dietitian (RD):  - New weight ordered  - Continue Ensure TID    Future/Additional Recommendations:  Monitor PO intake, supplement use, and weight trends     EVALUATION OF THE PROGRESS TOWARD GOALS   Diet: Level 7: Easy to Chew Dysphagia Diet   Snacks/supplements: Ensure vanilla TID between meals. Allow pt/family to order additional PRN    Intake: % per flowsheets, most intakes have been %  Good appetite per RN notes    Per [employer], pt typically ordering 1-2 meals/day. Ordered 2-day average of 2685 kcal and 106 g protein.      NEW FINDINGS   Met with pt and her daughter in room. Daughter interpreted for pt. Pt reports a good appetite and has been drinking 3 Ensure/day. She has also been receiving food from home/outside the hospital. Pt/family have no nutrition concerns at this time. Pt would like to continue with Ensure TID.    Weight:   Wt Readings from Last 10 Encounters:   04/14/23 50.5 kg (111 lb 5.3 oz)   04/09/23 52.6 kg (115 lb 15.4 oz)   04/08/23 47.6 kg (105 lb)      Care Everywhere:  7/26/22: 114.2 lbs  10/26/18: 47.6 kg (105 lb)     4% wt loss in less than 1 week. No new weight since last RD note - ordered new weight.    Labs:   Hemoglobin A1C: 6.2 (4/8)    Meds:  Miralax, ordered daily - has not been given over past week  Senna-docusate    GI: last BM 4/25 per I/Os    MALNUTRITION  % Intake: No decreased intake noted  % Weight Loss: Unable to assess  Subcutaneous Fat Loss: None observed  Muscle Loss: None observed  Fluid Accumulation/Edema: None noted  Malnutrition Diagnosis: Patient does not meet two of the established criteria necessary for diagnosing malnutrition    Previous Goals   Patient to consume % of nutritionally adequate meal trays  TID, or the equivalent with supplements/snacks.  Evaluation: Met    Previous Nutrition Diagnosis  Unintended weight loss related to decreased oral intake and dysphagia as evidenced by weight loss of 3.5% within one week.     Evaluation: Improving    CURRENT NUTRITION DIAGNOSIS  Predicted inadequate nutrient intake (kcal/pro) related to potential for menu fatigue with expected LOS.    INTERVENTIONS  Implementation  Collaboration with other providers - discussed in team rounds  Medical food supplement therapy - continue Ensure TID    Goals  Patient to consume % of nutritionally adequate meal trays TID, or the equivalent with supplements/snacks.    Monitoring/Evaluation  Progress toward goals will be monitored and evaluated per protocol.    Carrie Aceves RD, LD  [facility] RD pager: [phone]  Weekend/Holiday RD pager: [phone]

## 2023-04-26 NOTE — PLAN OF CARE
Discharge Planner Post-Acute Rehab PT:      Discharge Plan: home with  family assistance, home PT     Precautions: falls, alarms, R hemiparesis,  Do not leave on commode/EOB alone, cognitive impairment,  Hmong  needed     Current Status: *Dgtr Kayli li to transfer/assist with toileting  Bed Mobility: Min-A  Transfer: Min-A pivot B direction  Gait: up to 80' 40' 20'x2  ft, mod-A with WBQC no AFO for amb today.   Stairs: not stairs today, working more on gait tech.      [place]:  on  on   PASS:  on  on      Assessment: PT cont to work on R leg/ foot control and placement during amb. Gait tech for equal step length and heel strike for cont stability. Pt cont to work hard and this writer has seen good progress from last week.      Other Barriers to Discharge (DME, Family Training, etc):   Will need AFO, wheelchair and quad cane  Family Training    -------------------------------------------------------------------------------------------------------  Skilled set up on :  Pt dependent for proper placement of electrodes on R quads, hamstrings, anterior tib, and gastroc for optimum muscle contraction, safe positioning on w/c within frame and cushion for prevention of skin breakdown, feet secured to foot pedals, w/c secured to  w/ Q-straints.  Passive motion assessed to ensure proper positioning.       Pt performed 34:09 minutes of active FES ergometry with 100% stimulation applied to above muscles at 35-45 rpm with 1.99 Nm resistance.  This PT adjusted e-stim and cycling parameters in real-time to ensure palpable muscle contractions throughout session.  Please see [email or web address] for further details on patient's stimulation parameters and ergometry outcomes.       Changes in parameters that were part of this treatment:   - None. Cueing to increase RLE use and to maintain effort     Functional outcomes from this intervention include:   -reduced  spasticity  -improved sensory awareness and proprioception  -improved muscle strength  -improved motor coordination     Session Summary:   -Average Power: 6.8 W  -Asymmetry: L 12%  -Active Minutes: 31:30 mins

## 2023-04-26 NOTE — PROGRESS NOTES
Skilled set up on :  Pt dependent for proper placement of electrodes on RUE for optimum muscle contraction, safe positioning on w/c within frame and cushion for prevention of skin breakdown, feet secured to foot pedals, w/c secured to  w/ Q-straints.  Passive motion assessed to ensure proper positioning.       Pt performed 34 minutes of active FES ergometry with 0-100% stimulation applied to above muscles at 25-30 rpm with 0.5-2.05 Nm resistance. This OT adjusted e-stim and cycling parameters in real-time to ensure palpable muscle contractions throughout session.  Please see [email or web address] for further details on patient's stimulation parameters and ergometry outcomes.       Changes in parameters that were part of this treatment:   -no changes made this session     Functional outcomes from this intervention include:   -reduced spasticity  -improved sensory awareness and proprioception  -improved muscle strength  -improved motor coordination

## 2023-04-26 NOTE — PLAN OF CARE
FOCUS/GOAL  Bowel management, Bladder management, Pain management, Mobility, Skin integrity, and Safety management    ASSESSMENT, INTERVENTIONS AND CONTINUING PLAN FOR GOAL:  Patient is alert and oriented x 4. Is Hmong speaking. Daughter always here and always helps with calling for the patient.  services is provided by daughter in the room per patient preference. Continent of B/B. LBM 4/24/23 per patient and patient's daughter report. Continent of B/B. Patient c/o pain and was given Tylenol 650 mg with relief. Is assist of x1 with Pam Gudino for transfers,  based for nursing. Patient's daughter approved for Ax1 SPT transfers. On regular diet with liquids and has good appetite.   Goal Outcome Evaluation:

## 2023-04-26 NOTE — PROGRESS NOTES
"Discharge Planner Post-Acute Rehab OT:      Discharge Plan: Addendum: Home w/ home OT w/ family and 24/7 A     Precautions: Fall, R mir, *Dgtr Kayli okay to transfer/assist with toileting  Hmong speaking patient     Bedside Nursing to perform the following precautions for RUE shoulder subluxation:       No twisting, pulling, or lifting UE above 90 degrees of flexion or \"shoulder height\".    Support UE at all times to prevent further subluxation.    Position UE on arm tray when seated in wheelchair and on pillows when seated or lying in bed. Position UE in a neutral position.    Remove resting hand splint in morning; refer to splint orders for details    Avoid using hemiparetic UE for BP readings and IV/PICC placement when medically appropriate.     Current Status:  ADLs:    Mobility: W/C based. min A SPT toward pt's L side, mod A to the right.     Grooming: Set-up and SBA seated.     Dressing: UB min A, mod A with LBD tasks and footwear.    Bathing: Pt used the rolling shower chair but did SPT toward her L Min A. Pt bathed 5/10 body parts seated.     Toileting: Min-mod A with W/C<>toilet squat pivot transfer with therapy with blocking of RLE; dtr okay to assist with toileting  IADLs: TBD. Supportive family  Vision/Cognition: Vision appears functional, screen as appropriate. Pt is disoriented, impaired memory, impaired attention, impaired speech/communication, aphasia possibly and variably impulsive- responds well to safety cues.      Assessment: Utilized FES for RUE neuro re-education and sensorimotor skills; refer to care plan note for further details.    Other Barriers to Discharge (DME, Family Training, etc):   Pt has a standard height toilet, walk in shower or tub/shower combo.  She is used to using the walk in shower and does not have any AE in the bathroom.   DME: TBD  Family training: Daughter/family will be present and sleeping over night at times. Continue ongoing family training as appropriate.  "

## 2023-04-26 NOTE — CONSULTS
Attempted to meet with Mercy today, however, due to schedule changes there was some miscommunication and she was eating with her daughter during our scheduled time.  I will attempt to meet with her next week to address some concerns about sadness, adjustment to disability, and some questions regarding stroke.     Shayy Presley PsyD,   Clinical Neuropsychologist    Not a billable visit.

## 2023-04-26 NOTE — PROGRESS NOTES
"  [facility]   Acute Rehabilitation Unit  Daily progress note    INTERVAL HISTORY  Mercy Osman was seen and examined at bedside this morning during team rounds.  No acute events reported overnight.  She denies any concerns or questions at this time, is noting ongoing gradual improvements.    Functionally, she is making good progress, noting some good return in right arm.  Shoulder subluxation is improving, daughter is assisting with NMES 6x/day.  Daughter is also assisting with toileting.  She has ambulated 40' with mod A and cane.  Anticipating mixed mobility initially at discharge.  Cognition is affecting her language.  Will need assist with IADLs.  For full functional updates, see team rounds note from today.      MEDICATIONS    amLODIPine  10 mg Oral Daily     atorvastatin  20 mg Oral QPM     lisinopril  20 mg Oral Daily     polyethylene glycol  17 g Oral Daily     senna-docusate  2 tablet Oral At Bedtime        acetaminophen, diclofenac, hydrALAZINE, melatonin     PHYSICAL EXAM  /78 (BP Location: Left arm, Patient Position: Chair, Cuff Size: Adult Regular)   Pulse 88   Temp 98.7  F (37.1  C) (Oral)   Resp 18   Ht 1.473 m (4' 10\")   Wt 50.5 kg (111 lb 5.3 oz)   SpO2 98%   BMI 23.27 kg/m     Gen: NAD  Cardio: appears well-perfused  Pulm: non-labored on room air  Abd: soft, non-tender, non-distended  Ext: no appreciable edema in bilat lower extremities  Neuro/MSK: awake, alert, PERRL, left upper facial weakness, right lower facial droop, right hemiparesis improving  *Full exam deferred today for conversation    LABS  CBC RESULTS:   Recent Labs   Lab Test 04/24/23  0557 04/17/23  0945 04/14/23  0803   WBC 10.1 8.1 7.5   RBC 4.79 5.12 5.44*   HGB 12.4 12.9 13.7   HCT 40.2 42.6 45.1   MCV 84 83 83   MCH 25.9* 25.2* 25.2*   MCHC 30.8* 30.3* 30.4*   RDW 16.2* 16.5* 16.7*    388 340     Last Basic Metabolic Panel:  Recent Labs   Lab Test 04/24/23  0557 " 04/17/23  0945 04/14/23  0828 04/14/23  0803    139  --  139   POTASSIUM 4.7 3.5  --  4.0   CHLORIDE 102 104  --  106   CO2 26 23  --  20*   ANIONGAP 12 12  --  13   GLC 98 182* 97 107*   BUN 12.0 12.4  --  12.4   CR 0.69 0.71  --  0.76   GFRESTIMATED >90 >90  --  >90   SALAZAR 10.2* 10.0  --  9.6       Rehabilitation - continue comprehensive acute inpatient rehabilitation program with multidisciplinary approach including therapies, rehab nursing, and physiatry following. See interval history for updates.      ASSESSMENT AND PLAN  Mercy Osman is a 52 year old right hand dominant female with a past medical history of hypertension, [address]' palsy who was admitted on 4/8/23 with spontaneous acute left thalamic intracranial hemorrhage with hospital course complicated by hyperlipidemia, prediabetes, elevated troponin, anemia, hypokalemia, and loose stools.  She is now admitted to ARU on 4/14/23 for multidisciplinary rehabilitation and ongoing medical management.        Admission to acute inpatient rehab 4/14/23.    Impairment group code: Stroke Vascular Hemorrhagic 01.2 (R) Body Involvement (L) Brain -  left basal ganglia hemorrhage        1. PT, OT and SLP 60 minutes of each on a daily basis, in addition to rehab nursing and close management of physiatrist.       2. Impairment of ADL's: Noted to have impaired activity tolerance, impaired balance, impaired coordination, impaired ROM, impaired sensation, impaired strength w/ R hemiparesis, impaired motor control, fatigue, dizziness, and impaired weight shifting, all affecting her ability to safely and independently perform basic ADLs.  Goal for min A or less with basic ADLs.     3. Impairment of mobility:  Noted to have impaired activity tolerance, impaired balance, impaired coordination, impaired ROM, impaired sensation, impaired strength w/ R hemiparesis, impaired motor control, fatigue, dizziness, and impaired weight shifting, all affecting her ability to safely and  independently perform basic mobility.  Goal for min A or less with basic mobility.     4. Impairment of cognition/language/swallow:  Noted to have dysarthria and dysphagia, and would benefit from full cognitive-linguistic evaluation at ARU with goals for improved cognitive-linguistic skills to meet basic needs and safe tolerance of least restrictive diet.     5. Medical Conditions  New actions/orders/updates for today are in blue.     Left basal ganglia intraparenchymal hemorrhage, spontaneous, likely secondary to uncontrolled hypertension  Vasogenic cerebral edema  Deficits: right hemiparesis, right facial droop, right hemisensory deficit, right visual neglect, dysarthria, ?aphasia, dysphagia, suspect impaired cognition  - Long-term BP goal 140/90 or less, management as below  - Stroke PLC completed 4/10 (during inpatient stay)  - 4/18 noting some generalized right-sided pain but unable to further describe.  ?central.  Also with significant R shoulder subluxation. Continue PRN Tylenol and added PRN voltaren.  - Continue PT/OT/SLP  - Follow up with neurology 6-8 weeks after your discharge      Hypertension  PTA on amlodipine-benazepril 10-20 mg capsule.  BP elevated to 220s/100s at presentation. Initially managed on nicardipine drip, then transitioned to PO lisinopril + amlodipine.  - Long-term BP goal 140/90 or less  - Continue Lisinopril 20mg daily  - Continue amlodipine 10mg daily  - PRN hydralazine for SBP >160  - Monitor BP, adjust regimen as indicated     Hyperlipidemia  Started on statin therapy this admission with .  - Continue atorvastatin 20 mg daily     Troponin elevation, asymptomatic, spontaneously resolved  EKG without obvious acute ischemic changes.  Echo WNL.  No cardiogenic symptoms, suspect transient stress related troponin leak.  Peaked to 113 on 4/10.  Troponin improved spontaneously without intervention.  - Further work-up if any cardiogenic symptoms develop     Hypokalemia, resolved s/p  "replacement  Hyperchloremia, resolved with adequate hydration    - Trend BMP every Monday.  : K WNL 3.9, Cl   - Replete as needed per protocol     Pre-diabetes   Hgb A1c 6.2% 2023   - Monitor with surveillance with primary care outpatient  - No routine BG checks indicated, monitor periodically with BMP     Anemia, chronic   Hgb 11.5 on presentation.  WNL at 13.4 as of .  - Hgb goal >7, plt goal >50k  - Trend CBC every Monday.  : Hgb WNL 12.4    Major Depressive Disorder, recurrent episode, moderate  SW intake with PHQ-9 score 18, did indicate some passive suicidal ideation.  Per family, have noted some symptoms of depression since patient's son  of suicide ~3 years ago.  - Psychiatry consulted, appreciate assistance.  Met with patient .  Discussed potential medication trial, but patient would like to \"think about it\".  If agreeable, psychiatry recommending Remeron 7.5 mg at HS.  - Revisited with patient .  She would prefer to hold off on medication trial, but is now open to psychology, consulted and will be seeing patient on .       6. Adjustment to disability:  Clinical psychology to eval and treat if indicated  7. FEN: easy to chew (level 7) diet, thin liquids  8. Bowel: continent/incontinent, monitor, PRN and scheduled bowel meds available  9. Bladder: continent/incontinent  10. DVT Prophylaxis: mechanical  11. GI Prophylaxis: not indicated  12. Code: full  13. Disposition: goal for home with family support  14. ELOS:  target 23  15. Follow up Appointments on Discharge: PCP in 1-2 weeks, neurology in 6-8 weeks after discharge      Patient was seen and discussed with Dr. Adryan Zambrano, PM&R staff physician     Fabi Virgen PA-C  Physical Medicine & Rehabilitation      "

## 2023-04-26 NOTE — PLAN OF CARE
"Discharge Planner Post-Acute Rehab SLP:      Discharge Plan: Home with / supervision from family, ongoing speech therapy      Precautions: Disoriented, Mod-severe cognitive impairment, Hmong speaking (family interprets), falls risk, R hemiparesis     Current Status:  Hearing: Mild hearing loss per family report  Vision: WFL  Communication: Mild to moderate word finding difficulties  Cognition: Moderate-Severe Cognitive impairment  Swallow: Easy to Chew (7) and Thin Liquids (0). Patient and family indicate that patient prefers softer foods. Will monitor if patient presents with any future difficulties- denied difficulties      Assessment:  Patient's daughter present for interpreting per request. Patient up in wheelchair upon SLP arrival. Verbal reasoning/problem solving: Patient stated problem in pictures with 75% accuracy. Patient had significant difficulty with stating opposites (20% accuracy) and often responded \"I don't know\". Generative namin-2 items per category. Practiced word-finding strategies (describe appearance, color, how to prepare, draw item, etc). Patient able to answer most questions about items but not able to name further items.    Other Barriers to Discharge (Family Training, etc): family training                             "

## 2023-04-26 NOTE — CARE CONFERENCE
Acute Rehab Care Conference/Team Rounds    Type: Team Rounds    Present: Dr. Adryan Zambrano, Fabi Virgen PA, Dr. Shayy Presley Neuropsychologist, Manolo David PT, Chaya Metzger OT, Juanito Lugo SLP, Sylvia Turner LICSW, Carrie Aceves RD, Kathryn Machado RN, Anjali Apodaca Patient Care Supervisor, and Mercy Osman Patient.     Discharge Barriers/Treatment/Education    Rehab Diagnosis:  Post CVA     Active Medical Co-morbidities/Prognosis:     Patient Active Problem List   Diagnosis Code     ICH (intracerebral hemorrhage) (H) I61.9   R hemiparesis       Safety: Alert and oriented, Hmong speaking. Call light within reach. Daughter at the bedside.    Pain: Denies    Medications, Skin, Tubes/Lines: Medications taken whole with water. Skin intact. No tubes or lines.    Swallowing/Nutrition: No dysphagia goals. On modified diet per family request.     Bowel/Bladder: Continent of bladder, voiding without difficulty. Continent of bowel, last BM     Psychosocial: Pt. lives in house with  (Rush Roger) and adult children (1 daughter, 2 son).  1 stair to enter, 12 stairs within to basement but all needs can be met on main level. Pt was independent with all ADLs/IADLs, transfers, mobility and gait. Pt was dirving. Pt denies history of mental health. Pt scored 18 on PHQ-9 and reported having thoughts of harming herself. Kayli (Daughter) shared that Pt's son  by suicide and that it's been really hard on Pt. Pt. Denies alcohol\tobacco/drug use. Pt has adult children (1 daughter - Kayli Roger, 2 son - Mamta Roger and Joel Roger ). Daughter is able to provide 24/ support on discharge. She is interested in becoming the patient's PCA.     ADLs/IADLs: Pt making slow but steady progress with ADLs. Pt requires set-up with G/H tasks seated. Pt requires min A with UBD tasks and mod A with LBD tasks. Pt requires min A with W/C<>toilet stand pivot transfers and max A with toilet hygiene. Pt presenting with decreased  subluxation in right shoulder (~1 finger width), and daughter continuing to use NMES for right shoulder subluxation protocol. Utilizing FES and Xcite for RUE neuro re-education and sensorimotor skills. Recommending ongoing IP rehab at this time with  OT services upon discharge.     Mobility: PT is making steady progress with PT. Ponce improved to 20/56, PASS improved to 28. Min-A for bed mobility and transfers. Ambulates 40' with SBQC min/mod-A with R AFO. Max-A to ascend stairs. Goals for mixed mobility discharge. Will need AFO, cane, w/c. Family training prior to discharge.    Cognition/Language: Presenting with some language impairments as well as significant cognitive impairments. Able to name items consistently but unable to consistently expand upon the description of the items or relate them to each other. Consistently cooperative and engaged in therapy tasks. Family consistently present and providing adequate language interpretation. Will require 24/7 supervision and assistance with IADL tasks. Ongoing therapy upon facility discharge.     Community Re-Entry: w/c based due to mobility status    Transportation: Family to provide    Decision maker: self    Plan of Care and goals reviewed and updated.    Discharge Plan/Recommendations    Fall Precautions: continue    Patient/Family input to goals: yes     Estimated length of stay: 18 days     Overall plan for the patient: reach a level of mod I       Utilization Review and Continued Stay Justification    Medical Necessity Criteria:    For any criteria that is not met, please document reason and plan for discharge, transfer, or modification of plan of care to address.    Requires intensive rehabilitation program to treat functional deficits?: Yes    Requires 3x per week or greater involvement of rehabilitation physician to oversee rehabilitation program?: Yes    Requires rehabilitation nursing interventions?: Yes    Patient is making functional progress?:  Yes    There is a potential for additional functional progress? Yes    Patient is participating in therapy 3 hours per day a minimum of 5 days per week or 15 hours per week in 7 day period?:Yes    Has discharge needs that require coordinated discharge planning approach?:Yes      Barriers/Concerns related to meeting medical necessity criteria:  None     Team Plan to Address Concern:  As needed       Final Physician Sign off    Statement of Approval:  Adryan Zambrano, DO      Patient Goals  Social Work Goals: Confirm discharge recommendations with therapy, coordinate safe discharge plan and remain available to support and assist as needed.    OT Predicted Duration/Target Date for Goal Attainment: 05/05/23  Therapy Frequency (OT): Daily  OT: Hygiene/Grooming: modified independent  OT: Upper Body Dressing: Minimal assist  OT: Lower Body Dressing: Minimal assist  OT: Upper Body Bathing: Supervision/stand-by assist  OT: Lower Body Bathing: Minimal assist  OT: Bed Mobility: Supervision/stand-by assist  OT: Transfer: Minimal assist  OT: Toilet Transfer/Toileting: Minimal assist    PT Predicted Duration/Target Date for Goal Attainment: 04/28/23  PT Frequency: Daily  PT: Bed Mobility: Supervision/stand-by assist  PT: Transfers: Supervision/stand-by assist (with least restrictive assistive device)  PT: Gait: Minimal assist (25 feet with least restrictive assistive device)  PT: Stairs: Minimal assist (4-5 with family support to be able to get into the house)  PT: Wheelchair Mobility: 150 feet (with supervision in manual wheelchair)                       SLP Predicted Duration/Target Date for Goal Attainment: 05/05/23  Therapy Frequency (SLP Eval): daily  SLP: Goal 1: Patient will utilize compensatory memory strategies to recall novel information and answer orientation questions with 80% accuracy given minimal cues.  SLP: Goal 2: Patient will engage in basic-moderate level attention and problem solving tasks with a level of  80% accuracy given minimal verbal cues.       Nursing Goals  Bowel and Bladder care  Fall prevention   Medication Education  Skin Care protection

## 2023-04-26 NOTE — PLAN OF CARE
VS: Temp: 97.1  F (36.2  C) Temp src: Oral BP: 98/65 Pulse: 91   Resp: 16 SpO2: 97 % O2 Device: None (Room air)       BP medications held d/t order parameter not met   O2: >95% RA   Output: Continent. Voids without difficulties   Last BM: 4/25/23   Activity: A1 w/ roxane stedy   Skin: Intact    Pain: Denies    CMS: Alert and oriented x4. W/ word finding difficulties.  Denies numbness or tingling.   Dressing: None    Diet: Easy to chew L7, thin liquids   LDA: None    Equipment: Wheelchair, GB   Plan: TBD. Call light is in reach, continue w/ POC.   Additional Info: Pt is Hmong speaking. Pt has family member at bedside, helps with translation.

## 2023-04-26 NOTE — PLAN OF CARE
FOCUS/GOAL  Bladder management, Pain management, Mobility, Cognition/Memory/Judgment/Problem solving, and Safety management    ASSESSMENT, INTERVENTIONS AND CONTINUING PLAN FOR GOAL:  Pt is alert and oriented, Hmong speaking. Continent of bladder, voiding without difficulty. Up assist of 1 pivot with daughter, Pam Gudino with nursing. Denied pain or discomfort overnight. Appeared to be sleeping well during rounds. Call light within reach and daughter at the bedside.

## 2023-04-27 ENCOUNTER — APPOINTMENT (OUTPATIENT)
Dept: SPEECH THERAPY | Facility: CLINIC | Age: 53
End: 2023-04-27
Attending: PHYSICAL MEDICINE & REHABILITATION
Payer: COMMERCIAL

## 2023-04-27 ENCOUNTER — APPOINTMENT (OUTPATIENT)
Dept: PHYSICAL THERAPY | Facility: CLINIC | Age: 53
End: 2023-04-27
Attending: PHYSICAL MEDICINE & REHABILITATION
Payer: COMMERCIAL

## 2023-04-27 ENCOUNTER — APPOINTMENT (OUTPATIENT)
Dept: OCCUPATIONAL THERAPY | Facility: CLINIC | Age: 53
End: 2023-04-27
Attending: PHYSICAL MEDICINE & REHABILITATION
Payer: COMMERCIAL

## 2023-04-27 ENCOUNTER — APPOINTMENT (OUTPATIENT)
Dept: INTERPRETER SERVICES | Facility: CLINIC | Age: 53
End: 2023-04-27
Attending: PHYSICAL MEDICINE & REHABILITATION
Payer: COMMERCIAL

## 2023-04-27 PROCEDURE — 97530 THERAPEUTIC ACTIVITIES: CPT | Mod: GO

## 2023-04-27 PROCEDURE — 97535 SELF CARE MNGMENT TRAINING: CPT | Mod: GO

## 2023-04-27 PROCEDURE — 97130 THER IVNTJ EA ADDL 15 MIN: CPT | Mod: GN

## 2023-04-27 PROCEDURE — 97112 NEUROMUSCULAR REEDUCATION: CPT | Mod: GP | Performed by: PHYSICAL THERAPIST

## 2023-04-27 PROCEDURE — 97129 THER IVNTJ 1ST 15 MIN: CPT | Mod: GN

## 2023-04-27 PROCEDURE — 97112 NEUROMUSCULAR REEDUCATION: CPT | Mod: GO | Performed by: OCCUPATIONAL THERAPIST

## 2023-04-27 PROCEDURE — 128N000003 HC R&B REHAB

## 2023-04-27 PROCEDURE — 99231 SBSQ HOSP IP/OBS SF/LOW 25: CPT | Performed by: PHYSICIAN ASSISTANT

## 2023-04-27 PROCEDURE — 250N000013 HC RX MED GY IP 250 OP 250 PS 637: Performed by: PHYSICIAN ASSISTANT

## 2023-04-27 RX ADMIN — ATORVASTATIN CALCIUM 20 MG: 10 TABLET, FILM COATED ORAL at 20:56

## 2023-04-27 RX ADMIN — AMLODIPINE BESYLATE 10 MG: 10 TABLET ORAL at 08:51

## 2023-04-27 RX ADMIN — LISINOPRIL 20 MG: 20 TABLET ORAL at 08:51

## 2023-04-27 ASSESSMENT — ACTIVITIES OF DAILY LIVING (ADL)
ADLS_ACUITY_SCORE: 40

## 2023-04-27 NOTE — PROGRESS NOTES
"Discharge Planner Post-Acute Rehab OT:      Discharge Plan: Addendum: Home w/ home OT w/ family and 24/7 A     Precautions: Fall, R mir, *Dgtr Kayli okay to transfer/assist with toileting  Hmong speaking patient     Bedside Nursing to perform the following precautions for RUE shoulder subluxation:       No twisting, pulling, or lifting UE above 90 degrees of flexion or \"shoulder height\".    Support UE at all times to prevent further subluxation.    Position UE on arm tray when seated in wheelchair and on pillows when seated or lying in bed. Position UE in a neutral position.    Remove resting hand splint in morning; refer to splint orders for details    Avoid using hemiparetic UE for BP readings and IV/PICC placement when medically appropriate.     Current Status:  ADLs:    Mobility: W/C based. min A SPT toward pt's L side, mod A to the right.     Grooming: Set-up and SBA seated.     Dressing: UB min A, mod A with LBD tasks and footwear.    Bathing: Pt used the rolling shower chair but did SPT toward her L Min A. Pt bathed 5/10 body parts seated.     Toileting: Min A with W/C<>toilet squat pivot transfer with therapy with blocking of RLE; dtr okay to assist with toileting  IADLs: TBD. Supportive family  Vision/Cognition: Vision appears functional, screen as appropriate. Pt is disoriented, impaired memory, impaired attention, impaired speech/communication, aphasia possibly and variably impulsive- responds well to safety cues.      Assessment: Session focused on progressing SPT with toileting and RUE weight bearing to increase RUE proprioception and function for functional transfers/ADLs. Pt demonstrated increased IND with toilet transfers this date.    Other Barriers to Discharge (DME, Family Training, etc):   Pt has a standard height toilet, walk in shower or tub/shower combo.  She is used to using the walk in shower and does not have any AE in the bathroom.   DME: TBD  Family training: Daughter/family will be " present and sleeping over night at times. Continue ongoing family training as appropriate.

## 2023-04-27 NOTE — PLAN OF CARE
Goal Outcome Evaluation:      Plan of Care Reviewed With: patient    Overall Patient Progress: no change    Outcome Evaluation: Pt.alert oriented x4, stable V/S. Continent in bowel and bladder. pts daughter at bedside helping patients care. Denies pain, SOB, headache and chest pain.

## 2023-04-27 NOTE — PROGRESS NOTES
XCite stim unit for Activity Based Therapy. Skilled set up; determined appropriate FES parameters for each muscle group based off of strong tetanic response of muscle test.  Used the following activities from the software library: Hand  Intervention and patient response: pt completed 50 repetitions x2 sets with grasp/release and lumbrical grasp with writer guiding through transitional movements.

## 2023-04-27 NOTE — PLAN OF CARE
FOCUS/GOAL  Bowel management, Bladder management, Mobility, Skin integrity, and Safety management    ASSESSMENT, INTERVENTIONS AND CONTINUING PLAN FOR GOAL:  Patient is alert and oriented x 4. Hmong speaking. Continent of bladder, voiding without difficulty. Is assist of  x 1 pivot with daughter, and Pam Gudino with nursing. Denied pain or discomfort overnight. Daughter at bed site and will be spending a night here. Call light within reach. Nursing staff will continue monitor.

## 2023-04-27 NOTE — PLAN OF CARE
Skilled set up on :  Pt dependent for proper placement of electrodes on right quads, hamstrings, gastroc, and anterior tibialis for optimum muscle contraction, safe positioning on w/c within frame and cushion for prevention of skin breakdown, feet secured to foot pedals, w/c secured to  w/ Q-straints.  Passive motion assessed to ensure proper positioning.      Pt performed 40 minutes of active FES ergometry with 100% stimulation applied to above muscles at 35 rpm with 0.50-1.99 Nm resistance.  This PT adjusted e-stim and cycling parameters in real-time to ensure palpable muscle contractions throughout session.  Please see www.[employer].com for further details on patient's stimulation parameters and ergometry outcomes.      Changes in parameters that were part of this treatment:   -None    Functional outcomes from this intervention include:   -improved sensory awareness and proprioception  -improved muscle strength  -improved motor coordination    Session Summary:   -Average Power: 3.4 W  -Asymmetry: Left 7%  -Active Minutes: 34:24

## 2023-04-27 NOTE — PLAN OF CARE
Discharge Planner Post-Acute Rehab SLP:      Discharge Plan: Home with 24/7 supervision from family, ongoing speech therapy      Precautions: Disoriented, Mod-severe cognitive impairment, Hmong speaking (family interprets), falls risk, R hemiparesis     Current Status:  Hearing: Mild hearing loss per family report  Vision: WFL  Communication: Mild to moderate word finding difficulties  Cognition: Moderate-Severe Cognitive impairment  Swallow: Easy to Chew (7) and Thin Liquids (0). Patient and family indicate that patient prefers softer foods. Will monitor if patient presents with any future difficulties- denied difficulties 04/23     Assessment:  Patient's daughter present for interpreting per request. Pt organized 3 step sequence with 75% accuracy with only prompts and probing questions, mod cues needed to increase accuracy to 100%. Word finding evident based on discussion with daughter - she often used more vague/general words rather than specific names during times of word finding. It appears she would use some circumlocutions to assist in her communication, pt's daughter was always able to understand pt especially given contextual cues.     Other Barriers to Discharge (Family Training, etc): family training

## 2023-04-27 NOTE — PLAN OF CARE
Goal Outcome Evaluation:      Plan of Care Reviewed With: patient    Overall Patient Progress: no changeOverall Patient Progress: no change     Pt A&Ox4. A1 roxane or SP with daughter. Mixed cont of B&B, daughter ok to help toilet per orders. Pt denies pain, SOB,headache, nausea or new numbness/tingling. No new skin issues. Call light in reach, alarms on, calls appropriately,

## 2023-04-27 NOTE — PROGRESS NOTES
"  [facility]   Acute Rehabilitation Unit  Daily progress note    INTERVAL HISTORY  Mercy Osman was seen and examined this afternoon about to start OT.  Daughter was present and  assisting via phone.  No acute events reported overnight.  Patient reports that she is feeling well overall.  Notes good sleep.  Denies any recurrent pain on right side though she is now describing \"numbness\".  Expresses desire for ongoing recovery of strength in her right arm and resolution of numbness.  Encouraged by progress so far and discussed general stroke recovery.  Denies headache, dizziness, shortness of breath, abdominal pain, nausea.  Patient and daughter deny additional concerns or questions at this time.    Functionally, she is needing min to mod A for pivot transfers, set-up and SBA for seated grooming, min A for upper body dressing, mod A for lower body dressing.  Per SLP, word finding evident based on discussion with daughter - she often used more vague/general words rather than specific names during times of word finding. It appears she would use some circumlocutions to assist in her communication, pt's daughter was always able to understand pt especially given contextual cues.       MEDICATIONS    amLODIPine  10 mg Oral Daily     atorvastatin  20 mg Oral QPM     lisinopril  20 mg Oral Daily     polyethylene glycol  17 g Oral Daily     senna-docusate  2 tablet Oral At Bedtime        acetaminophen, diclofenac, hydrALAZINE, melatonin     PHYSICAL EXAM  /81 (BP Location: Left arm)   Pulse 87   Temp 97.1  F (36.2  C) (Oral)   Resp 16   Ht 1.473 m (4' 10\")   Wt 50.5 kg (111 lb 5.3 oz)   SpO2 97%   BMI 23.27 kg/m     Gen: NAD  Cardio: appears well-perfused  Pulm: non-labored on room air  Abd: soft, non-tender, non-distended  Ext: no appreciable edema in bilat lower extremities  Neuro/MSK: awake, alert, PERRL, left upper facial weakness, right lower facial droop, right " hemiparesis improving      LABS  CBC RESULTS:   Recent Labs   Lab Test 04/24/23  0557 04/17/23  0945 04/14/23  0803   WBC 10.1 8.1 7.5   RBC 4.79 5.12 5.44*   HGB 12.4 12.9 13.7   HCT 40.2 42.6 45.1   MCV 84 83 83   MCH 25.9* 25.2* 25.2*   MCHC 30.8* 30.3* 30.4*   RDW 16.2* 16.5* 16.7*    388 340     Last Basic Metabolic Panel:  Recent Labs   Lab Test 04/24/23  0557 04/17/23  0945 04/14/23  0828 04/14/23  0803    139  --  139   POTASSIUM 4.7 3.5  --  4.0   CHLORIDE 102 104  --  106   CO2 26 23  --  20*   ANIONGAP 12 12  --  13   GLC 98 182* 97 107*   BUN 12.0 12.4  --  12.4   CR 0.69 0.71  --  0.76   GFRESTIMATED >90 >90  --  >90   SALAZAR 10.2* 10.0  --  9.6       Rehabilitation - continue comprehensive acute inpatient rehabilitation program with multidisciplinary approach including therapies, rehab nursing, and physiatry following. See interval history for updates.      ASSESSMENT AND PLAN  Mercy Osman is a 52 year old right hand dominant female with a past medical history of hypertension, Fingal' palsy who was admitted on 4/8/23 with spontaneous acute left thalamic intracranial hemorrhage with hospital course complicated by hyperlipidemia, prediabetes, elevated troponin, anemia, hypokalemia, and loose stools.  She is now admitted to ARU on 4/14/23 for multidisciplinary rehabilitation and ongoing medical management.        Admission to acute inpatient rehab 4/14/23.    Impairment group code: Stroke Vascular Hemorrhagic 01.2 (R) Body Involvement (L) Brain -  left basal ganglia hemorrhage        1. PT, OT and SLP 60 minutes of each on a daily basis, in addition to rehab nursing and close management of physiatrist.       2. Impairment of ADL's: Noted to have impaired activity tolerance, impaired balance, impaired coordination, impaired ROM, impaired sensation, impaired strength w/ R hemiparesis, impaired motor control, fatigue, dizziness, and impaired weight shifting, all affecting her ability to safely and  independently perform basic ADLs.  Goal for min A or less with basic ADLs.     3. Impairment of mobility:  Noted to have impaired activity tolerance, impaired balance, impaired coordination, impaired ROM, impaired sensation, impaired strength w/ R hemiparesis, impaired motor control, fatigue, dizziness, and impaired weight shifting, all affecting her ability to safely and independently perform basic mobility.  Goal for min A or less with basic mobility.     4. Impairment of cognition/language/swallow:  Noted to have dysarthria and dysphagia, and would benefit from full cognitive-linguistic evaluation at ARU with goals for improved cognitive-linguistic skills to meet basic needs and safe tolerance of least restrictive diet.     5. Medical Conditions  New actions/orders/updates for today are in blue.     Left basal ganglia intraparenchymal hemorrhage, spontaneous, likely secondary to uncontrolled hypertension  Vasogenic cerebral edema  Deficits: right hemiparesis, right facial droop, right hemisensory deficit, right visual neglect, dysarthria, ?aphasia, dysphagia, suspect impaired cognition  - Long-term BP goal 140/90 or less, management as below  - Stroke PLC completed 4/10 (during inpatient stay)  - 4/18 noting some generalized right-sided pain but unable to further describe.  ?central.  Also with significant R shoulder subluxation. Continue PRN Tylenol and added PRN voltaren.  - Continue PT/OT/SLP  - Follow up with neurology 6-8 weeks after your discharge      Hypertension  PTA on amlodipine-benazepril 10-20 mg capsule.  BP elevated to 220s/100s at presentation. Initially managed on nicardipine drip, then transitioned to PO lisinopril + amlodipine.  - Long-term BP goal 140/90 or less  - Continue Lisinopril 20mg daily  - Continue amlodipine 10mg daily  - PRN hydralazine for SBP >160  - Monitor BP, adjust regimen as indicated     Hyperlipidemia  Started on statin therapy this admission with .  - Continue  "atorvastatin 20 mg daily     Troponin elevation, asymptomatic, spontaneously resolved  EKG without obvious acute ischemic changes.  Echo WNL.  No cardiogenic symptoms, suspect transient stress related troponin leak.  Peaked to 113 on 4/10.  Troponin improved spontaneously without intervention.  - Further work-up if any cardiogenic symptoms develop     Hypokalemia, resolved s/p replacement  Hyperchloremia, resolved with adequate hydration    - Trend BMP every Monday.  : K WNL 4.7, Cl WNL at 102  - Replete as needed per protocol     Pre-diabetes   Hgb A1c 6.2% 2023   - Monitor with surveillance with primary care outpatient  - No routine BG checks indicated, monitor periodically with BMP     Anemia, chronic   Hgb 11.5 on presentation.  WNL at 13.4 as of .  - Hgb goal >7, plt goal >50k  - Trend CBC every Monday.  : Hgb stable at 12.4    Major Depressive Disorder, recurrent episode, moderate  SW intake with PHQ-9 score 18, did indicate some passive suicidal ideation.  Per family, have noted some symptoms of depression since patient's son  of suicide ~3 years ago.  - Psychiatry consulted, appreciate assistance.  Met with patient .  Discussed potential medication trial, but patient would like to \"think about it\".  If agreeable, psychiatry recommending Remeron 7.5 mg at HS.  - Revisited with patient .  She would prefer to hold off on medication trial, but is now open to psychology, consulted and will be seeing patient on .       6. Adjustment to disability:  Clinical psychology to eval and treat if indicated  7. FEN: easy to chew (level 7) diet, thin liquids  8. Bowel: continent/incontinent, monitor, PRN and scheduled bowel meds available  9. Bladder: continent/incontinent  10. DVT Prophylaxis: mechanical  11. GI Prophylaxis: not indicated  12. Code: full  13. Disposition: home with family support  14. ELOS:  target 23  15. Follow up Appointments on Discharge: PCP in 1-2 weeks, neurology " in 6-8 weeks after discharge      Patient was discussed with Dr. Adryan Zambrano, PM&R staff physician     Fabi Virgen PA-C  Physical Medicine & Rehabilitation

## 2023-04-27 NOTE — PLAN OF CARE
FOCUS/GOAL  Bowel management, Bladder management, Pain management, Mobility, Skin integrity, and Safety management    ASSESSMENT, INTERVENTIONS AND CONTINUING PLAN FOR GOAL:  Patient is alert and oriented x 4. Patient slept well through the night. Is Hmong speaking. Continent of B/B. Is assist of  x 1 pivot with daughter, and Pam Gudino with nursing. Denied pain or discomfort overnight. Daughter at bed side. Call light within reach. Nursing staff will continue with poc.

## 2023-04-28 ENCOUNTER — APPOINTMENT (OUTPATIENT)
Dept: SPEECH THERAPY | Facility: CLINIC | Age: 53
End: 2023-04-28
Attending: PHYSICAL MEDICINE & REHABILITATION
Payer: COMMERCIAL

## 2023-04-28 ENCOUNTER — APPOINTMENT (OUTPATIENT)
Dept: PHYSICAL THERAPY | Facility: CLINIC | Age: 53
End: 2023-04-28
Attending: PHYSICAL MEDICINE & REHABILITATION
Payer: COMMERCIAL

## 2023-04-28 ENCOUNTER — APPOINTMENT (OUTPATIENT)
Dept: OCCUPATIONAL THERAPY | Facility: CLINIC | Age: 53
End: 2023-04-28
Attending: PHYSICAL MEDICINE & REHABILITATION
Payer: COMMERCIAL

## 2023-04-28 PROCEDURE — 128N000003 HC R&B REHAB

## 2023-04-28 PROCEDURE — 97110 THERAPEUTIC EXERCISES: CPT | Mod: GO

## 2023-04-28 PROCEDURE — 99232 SBSQ HOSP IP/OBS MODERATE 35: CPT | Performed by: PHYSICIAN ASSISTANT

## 2023-04-28 PROCEDURE — 97130 THER IVNTJ EA ADDL 15 MIN: CPT | Mod: GN | Performed by: REHABILITATION PRACTITIONER

## 2023-04-28 PROCEDURE — 97130 THER IVNTJ EA ADDL 15 MIN: CPT | Mod: GN | Performed by: SPEECH-LANGUAGE PATHOLOGIST

## 2023-04-28 PROCEDURE — 97112 NEUROMUSCULAR REEDUCATION: CPT | Mod: GP

## 2023-04-28 PROCEDURE — 97116 GAIT TRAINING THERAPY: CPT | Mod: GP

## 2023-04-28 PROCEDURE — 97129 THER IVNTJ 1ST 15 MIN: CPT | Mod: GN | Performed by: SPEECH-LANGUAGE PATHOLOGIST

## 2023-04-28 PROCEDURE — 250N000013 HC RX MED GY IP 250 OP 250 PS 637: Performed by: PHYSICIAN ASSISTANT

## 2023-04-28 PROCEDURE — 97129 THER IVNTJ 1ST 15 MIN: CPT | Mod: GN | Performed by: REHABILITATION PRACTITIONER

## 2023-04-28 RX ADMIN — LISINOPRIL 20 MG: 20 TABLET ORAL at 08:16

## 2023-04-28 RX ADMIN — ATORVASTATIN CALCIUM 20 MG: 10 TABLET, FILM COATED ORAL at 20:48

## 2023-04-28 RX ADMIN — AMLODIPINE BESYLATE 10 MG: 10 TABLET ORAL at 08:16

## 2023-04-28 ASSESSMENT — ACTIVITIES OF DAILY LIVING (ADL)
ADLS_ACUITY_SCORE: 40
ADLS_ACUITY_SCORE: 37
ADLS_ACUITY_SCORE: 40
ADLS_ACUITY_SCORE: 37

## 2023-04-28 NOTE — PLAN OF CARE
Goal Outcome Evaluation:    Plan of Care Reviewed With: patient, child    Overall Patient Progress: no change    Outcome Evaluation: Pt and DTR demonstrated adequate knowledge of pt's medications. She continues to be free from skin breakdown and falls, DTR is helping with ADLs and is okayed to transfer pt.     Slept most of the night, daughter in room.

## 2023-04-28 NOTE — PROGRESS NOTES
"Discharge Planner Post-Acute Rehab OT:      Discharge Plan: Addendum: Home w/ home OT w/ family and 24/7 A     Precautions: Fall, R mir, *Dgtr Kayli okay to transfer/assist with toileting  Hmong speaking patient     Bedside Nursing to perform the following precautions for RUE shoulder subluxation:       No twisting, pulling, or lifting UE above 90 degrees of flexion or \"shoulder height\".    Support UE at all times to prevent further subluxation.    Position UE on arm tray when seated in wheelchair and on pillows when seated or lying in bed. Position UE in a neutral position.    Remove resting hand splint in morning; refer to splint orders for details    Avoid using hemiparetic UE for BP readings and IV/PICC placement when medically appropriate.     Current Status:  ADLs:    Mobility: W/C based. min A SPT toward pt's L side, mod A to the right.     Grooming: Set-up and SBA seated.     Dressing: UB min A, mod A with LBD tasks and footwear.    Bathing: Pt used the rolling shower chair but did SPT toward her L Min A. Pt bathed 5/10 body parts seated.     Toileting: Min A with W/C<>toilet squat pivot transfer with therapy with blocking of RLE; dtr okay to assist with toileting  IADLs: TBD. Supportive family  Vision/Cognition: Vision appears functional, screen as appropriate. Pt is disoriented, impaired memory, impaired attention, impaired speech/communication, aphasia possibly and variably impulsive- responds well to safety cues.      Assessment: Tx focus on dynamic balance and functional reach for LB clothing mgmt with weight bearing exercise on mat for neuro re education of RUE. Pt demonstrating good motivation and effort throughout treatment session. Continues to benefit from min A for SPT toward L side.     Other Barriers to Discharge (DME, Family Training, etc):   Pt has a standard height toilet, walk in shower or tub/shower combo.  She is used to using the walk in shower and does not have any AE in the bathroom. "   DME: TBSHAWN  Family training: Daughter/family will be present and sleeping over night at times. Continue ongoing family training as appropriate.

## 2023-04-28 NOTE — PLAN OF CARE
[id number]    Patient is alert and oriented x 4. Hmong speaking. Continent of bladder, voiding without difficulty. Is assist of  x 1 pivot with daughter, and Pam Gudino with nursing. Denied pain or discomfort overnight. Daughter at bed side and does her cares. Call light within reach. Nursing staff will continue monitor

## 2023-04-28 NOTE — PLAN OF CARE
Discharge Planner Post-Acute Rehab PT:      Discharge Plan: home with  family assistance, home PT     Precautions: alarms, R hemiparesis,  Do not leave on commode/EOB alone,  Hmong       Current Status: *Dgtr Kayli okay to transfer/assist with toileting  Bed Mobility: Min-A  Transfer: Min-A pivot B direction  Gait: up to 40 ft CGA/min-A WBQC R AFO. Poor furniture approach  Stairs: max-A single rail     Ponce/56 on  on   PASS:    on  on      Assessment: RLE strength and stance drilled today. Furniture approach inconsistent leading to fall risk. Will start to get equipment together next week. Trending toward assist for all mobility at discharge.     Other Barriers to Discharge (DME, Family Training, etc):   AFO  Quad Cane  W/c - K3 rental  Stairs to enter - 2 w/o rail  Family Training

## 2023-04-28 NOTE — PLAN OF CARE
Discharge Planner Post-Acute Rehab SLP:      Discharge Plan: Home with 24/7 supervision from family, ongoing speech therapy      Precautions: Disoriented, Mod-severe cognitive impairment, Hmong speaking (family interprets), falls risk, R hemiparesis     Current Status:  Hearing: Mild hearing loss per family report  Vision: WFL  Communication: Mild to moderate word finding difficulties  Cognition: Moderate-Severe Cognitive impairment  Swallow: Easy to Chew (7) and Thin Liquids (0). Patient and family indicate that patient prefers softer foods. Will monitor if patient presents with any future difficulties- denied difficulties 04/23     Assessment:  Pt seen for cognitive communication tx. Engaged pt in verbal reasoning task. Given three related words, pt required mod vcs to identify their category. Given a category, pt required mod vcs to generate 3 items within category.     Other Barriers to Discharge (Family Training, etc): family training

## 2023-04-28 NOTE — PLAN OF CARE
Goal Outcome Evaluation:      Plan of Care Reviewed With: patient    Overall Patient Progress: no changeOverall Patient Progress: no change     Pt A&Ox4. Hmong speaking, daughter utilized for translating. Mixed cont. Daughter able to transfer A1 SP and provides cares for pt. Pt denies pain, SOB, headache, nausea or new numbness/tingling. No new skin issues. Call light in reach, alarms on, calls appropriately.

## 2023-04-28 NOTE — PROGRESS NOTES
"  [facility]   Acute Rehabilitation Unit  Daily progress note    INTERVAL HISTORY  Mercy Osman was seen and examined this morning.  Daughter, spouse, and another family member were present and  assisting via phone.  No acute events reported overnight.  Patient reports that she is feeling good overall.  Reports good sleep last night.  Notes ongoing weakness and numbness in her right side, though does endorse ongoing improvement.  Denies any associated pain.  Also denies headache, dizziness, chest pain, shortness of breath, abdominal pain, or nausea.  Daughter reports last BM was last night.  Family asks some questions regarding stroke recovery.  Has made good gains and encouraged to continue with current plan of care.  Denies other concerns or questions at this time.    Functionally, SLP notes patient was seen for cognitive communication tx. Engaged pt in verbal reasoning task. Given three related words, pt required mod vcs to identify their category. Given a category, pt required mod vcs to generate 3 items within category.      MEDICATIONS    amLODIPine  10 mg Oral Daily     atorvastatin  20 mg Oral QPM     lisinopril  20 mg Oral Daily     polyethylene glycol  17 g Oral Daily     senna-docusate  2 tablet Oral At Bedtime        acetaminophen, diclofenac, hydrALAZINE, melatonin     PHYSICAL EXAM  /81 (BP Location: Left arm, Patient Position: Chair)   Pulse 82   Temp 98.6  F (37  C) (Oral)   Resp 18   Ht 1.473 m (4' 10\")   Wt 50.5 kg (111 lb 5.3 oz)   SpO2 98%   BMI 23.27 kg/m     Gen: NAD  Cardio: RRR, no murmurs  Pulm: non-labored on room air, lungs CTA bilaterally  Abd: soft, non-tender, non-distended, bowel sounds present  Ext: no appreciable edema in bilat lower extremities  Neuro/MSK: awake, alert, PERRL, left upper facial weakness, right lower facial droop, RUE SF 2/5, EE 3/5, EF 3/5, WE 2/5,  3/5; RLE HF 3/5, KE 4/5, DF 4/5, PF 4/5.  No tone noted " on right.  1-finger right shoulder subluxation.      LABS  CBC RESULTS:   Recent Labs   Lab Test 04/24/23  0557 04/17/23  0945 04/14/23  0803   WBC 10.1 8.1 7.5   RBC 4.79 5.12 5.44*   HGB 12.4 12.9 13.7   HCT 40.2 42.6 45.1   MCV 84 83 83   MCH 25.9* 25.2* 25.2*   MCHC 30.8* 30.3* 30.4*   RDW 16.2* 16.5* 16.7*    388 340     Last Basic Metabolic Panel:  Recent Labs   Lab Test 04/24/23  0557 04/17/23  0945 04/14/23  0828 04/14/23  0803    139  --  139   POTASSIUM 4.7 3.5  --  4.0   CHLORIDE 102 104  --  106   CO2 26 23  --  20*   ANIONGAP 12 12  --  13   GLC 98 182* 97 107*   BUN 12.0 12.4  --  12.4   CR 0.69 0.71  --  0.76   GFRESTIMATED >90 >90  --  >90   SALAZAR 10.2* 10.0  --  9.6       Rehabilitation - continue comprehensive acute inpatient rehabilitation program with multidisciplinary approach including therapies, rehab nursing, and physiatry following. See interval history for updates.      ASSESSMENT AND PLAN  Mercy Osman is a 52 year old right hand dominant female with a past medical history of hypertension, [place]' palsy who was admitted on 4/8/23 with spontaneous acute left thalamic intracranial hemorrhage with hospital course complicated by hyperlipidemia, prediabetes, elevated troponin, anemia, hypokalemia, and loose stools.  She is now admitted to ARU on 4/14/23 for multidisciplinary rehabilitation and ongoing medical management.        Admission to acute inpatient rehab 4/14/23.    Impairment group code: Stroke Vascular Hemorrhagic 01.2 (R) Body Involvement (L) Brain -  left basal ganglia hemorrhage        1. PT, OT and SLP 60 minutes of each on a daily basis, in addition to rehab nursing and close management of physiatrist.       2. Impairment of ADL's: Noted to have impaired activity tolerance, impaired balance, impaired coordination, impaired ROM, impaired sensation, impaired strength w/ R hemiparesis, impaired motor control, fatigue, dizziness, and impaired weight shifting, all affecting  her ability to safely and independently perform basic ADLs.  Goal for min A or less with basic ADLs.     3. Impairment of mobility:  Noted to have impaired activity tolerance, impaired balance, impaired coordination, impaired ROM, impaired sensation, impaired strength w/ R hemiparesis, impaired motor control, fatigue, dizziness, and impaired weight shifting, all affecting her ability to safely and independently perform basic mobility.  Goal for min A or less with basic mobility.     4. Impairment of cognition/language/swallow:  Noted to have dysarthria and dysphagia, and would benefit from full cognitive-linguistic evaluation at ARU with goals for improved cognitive-linguistic skills to meet basic needs and safe tolerance of least restrictive diet.     5. Medical Conditions  New actions/orders/updates for today are in blue.     Left basal ganglia intraparenchymal hemorrhage, spontaneous, likely secondary to uncontrolled hypertension  Vasogenic cerebral edema  Deficits: right hemiparesis, right facial droop, right hemisensory deficit, right visual neglect, dysarthria, ?aphasia, dysphagia, suspect impaired cognition  - Long-term BP goal 140/90 or less, management as below  - Stroke PLC completed 4/10 (during inpatient stay)  - 4/18 noting some generalized right-sided pain but unable to further describe.  ?central.  Also with significant R shoulder subluxation. Continue PRN Tylenol and added PRN voltaren.  - Continue PT/OT/SLP  - Follow up with neurology 6-8 weeks after your discharge      Hypertension  PTA on amlodipine-benazepril 10-20 mg capsule.  BP elevated to 220s/100s at presentation. Initially managed on nicardipine drip, then transitioned to PO lisinopril + amlodipine.  - Long-term BP goal 140/90 or less  - Continue Lisinopril 20mg daily  - Continue amlodipine 10mg daily  - PRN hydralazine for SBP >160  - Monitor BP, adjust regimen as indicated     Hyperlipidemia  Started on statin therapy this admission with  ".  - Continue atorvastatin 20 mg daily     Troponin elevation, asymptomatic, spontaneously resolved  EKG without obvious acute ischemic changes.  Echo WNL.  No cardiogenic symptoms, suspect transient stress related troponin leak.  Peaked to 113 on 4/10.  Troponin improved spontaneously without intervention.  - Further work-up if any cardiogenic symptoms develop     Hypokalemia, resolved s/p replacement  Hyperchloremia, resolved with adequate hydration    - Trend BMP every Monday.  : K WNL 4.7, Cl WNL at 102  - Replete as needed per protocol     Pre-diabetes   Hgb A1c 6.2% 2023   - Monitor with surveillance with primary care outpatient  - No routine BG checks indicated, monitor periodically with BMP     Anemia, chronic   Hgb 11.5 on presentation.  WNL at 13.4 as of .  - Hgb goal >7, plt goal >50k  - Trend CBC every Monday.  : Hgb stable at 12.4    Major Depressive Disorder, recurrent episode, moderate  SW intake with PHQ-9 score 18, did indicate some passive suicidal ideation.  Per family, have noted some symptoms of depression since patient's son  of suicide ~3 years ago.  - Psychiatry consulted, appreciate assistance.  Met with patient .  Discussed potential medication trial, but patient would like to \"think about it\".  If agreeable, psychiatry recommending Remeron 7.5 mg at HS.  - Revisited with patient .  She would prefer to hold off on medication trial, but is now open to psychology, consulted and attempted to see patient on  though scheduling problem, will now see next week.       6. Adjustment to disability:  Clinical psychology to eval and treat if indicated  7. FEN: easy to chew (level 7) diet, thin liquids  8. Bowel: continent/incontinent, monitor, PRN and scheduled bowel meds available  9. Bladder: continent/incontinent  10. DVT Prophylaxis: mechanical  11. GI Prophylaxis: not indicated  12. Code: full  13. Disposition: home with family support  14. ELOS:  target " 5/5/23  15. Follow up Appointments on Discharge: PCP in 1-2 weeks, neurology in 6-8 weeks after discharge      Patient was discussed with Dr. Adryan Zambrano, PM&R staff physician     Fabi Virgen PA-C  Physical Medicine & Rehabilitation

## 2023-04-29 ENCOUNTER — APPOINTMENT (OUTPATIENT)
Dept: SPEECH THERAPY | Facility: CLINIC | Age: 53
End: 2023-04-29
Attending: PHYSICAL MEDICINE & REHABILITATION
Payer: COMMERCIAL

## 2023-04-29 ENCOUNTER — APPOINTMENT (OUTPATIENT)
Dept: PHYSICAL THERAPY | Facility: CLINIC | Age: 53
End: 2023-04-29
Attending: PHYSICAL MEDICINE & REHABILITATION
Payer: COMMERCIAL

## 2023-04-29 ENCOUNTER — APPOINTMENT (OUTPATIENT)
Dept: OCCUPATIONAL THERAPY | Facility: CLINIC | Age: 53
End: 2023-04-29
Attending: PHYSICAL MEDICINE & REHABILITATION
Payer: COMMERCIAL

## 2023-04-29 PROCEDURE — 97112 NEUROMUSCULAR REEDUCATION: CPT | Mod: GP

## 2023-04-29 PROCEDURE — 128N000003 HC R&B REHAB

## 2023-04-29 PROCEDURE — 92507 TX SP LANG VOICE COMM INDIV: CPT | Mod: GN

## 2023-04-29 PROCEDURE — 92507 TX SP LANG VOICE COMM INDIV: CPT | Mod: GN | Performed by: SPEECH-LANGUAGE PATHOLOGIST

## 2023-04-29 PROCEDURE — 97112 NEUROMUSCULAR REEDUCATION: CPT | Mod: GO | Performed by: OCCUPATIONAL THERAPIST

## 2023-04-29 PROCEDURE — 250N000013 HC RX MED GY IP 250 OP 250 PS 637: Performed by: PHYSICIAN ASSISTANT

## 2023-04-29 PROCEDURE — 99231 SBSQ HOSP IP/OBS SF/LOW 25: CPT | Performed by: PHYSICAL MEDICINE & REHABILITATION

## 2023-04-29 RX ADMIN — SENNOSIDES AND DOCUSATE SODIUM 2 TABLET: 50; 8.6 TABLET ORAL at 20:12

## 2023-04-29 RX ADMIN — LISINOPRIL 20 MG: 20 TABLET ORAL at 08:03

## 2023-04-29 RX ADMIN — AMLODIPINE BESYLATE 10 MG: 10 TABLET ORAL at 08:03

## 2023-04-29 RX ADMIN — ATORVASTATIN CALCIUM 20 MG: 10 TABLET, FILM COATED ORAL at 20:12

## 2023-04-29 ASSESSMENT — ACTIVITIES OF DAILY LIVING (ADL)
ADLS_ACUITY_SCORE: 37

## 2023-04-29 NOTE — PLAN OF CARE
Goal Outcome Evaluation:       Pt A&Ox4. A1 SP, daughter OK to transfer. Per daughter, pt cont of B&B, LBM 4/29. Pt denies pain, SOB, headache, nausea, or new numbness/tingling. No new skin issues. Call light in reach, alarms on, calls appropriately.

## 2023-04-29 NOTE — PLAN OF CARE
Discharge Planner Post-Acute Rehab SLP:      Discharge Plan: Home with 24/7 supervision from family, ongoing speech therapy      Precautions: Disoriented, Mod-severe cognitive impairment, Hmong speaking (family interprets), falls risk, R hemiparesis     Current Status:  Hearing: Mild hearing loss per family report  Vision: WFL  Communication: Mild to moderate word finding difficulties  Cognition: Moderate-Severe Cognitive impairment  Swallow: Easy to Chew (7) and Thin Liquids (0). Patient and family indicate that patient prefers softer foods. Will monitor if patient presents with any future difficulties- denied difficulties 04/23     Assessment:   Pt performed convergent feature analysis to determine relationship between various objects x 90% with min cues.  Pt performed verbal compare/contrast task x 90% with moderate cues.     Other Barriers to Discharge (Family Training, etc): family training

## 2023-04-29 NOTE — PLAN OF CARE
Goal Outcome Evaluation:      Plan of Care Reviewed With: patient  Overall Patient Progress: no change    Outcome Evaluation: Pt.alert oriented x4. Denies pain, SOB, chest pain and any discomfort. A1 pivot/ roxane ramirez, daughter at bedside most of the shift.V/S stable, refused scheduled Senna even after education.3 side rails up, call light within reach. Continue with POC.

## 2023-04-29 NOTE — PLAN OF CARE
FOCUS/GOAL  Medical management    ASSESSMENT, INTERVENTIONS AND CONTINUING PLAN FOR GOAL:  Pt is alert and oriented. No complaints of pain. Assist of 1 pivot. Continent of bladder. Daughter at bedside and attentive. Appeared to be sleeping on rounds.

## 2023-04-29 NOTE — PLAN OF CARE
Discharge Planner Post-Acute Rehab PT:      Discharge Plan: home with  family assistance, home PT     Precautions: alarms, R hemiparesis,  Do not leave on commode/EOB alone,  Hmong       Current Status: *Dgtr Kayli li to transfer/assist with toileting  Bed Mobility: Min-A  Transfer: Min-A pivot B direction  Gait: up to 40 ft CGA/min-A WBQC R AFO. Poor furniture approach  Stairs: max-A single rail     Ponce/56 on  on   PASS:    on  on      Assessment: FES today, appears more fatigued than last few days. On track with care plan.     Other Barriers to Discharge (DME, Family Training, etc):   AFO  Quad Cane  W/c - K3 rental  Stairs to enter - 2 w/o rail  Family Training    -------------------------------------------------------------------------------------------------------------------------------------    Skilled set up on :  Pt dependent for proper placement of electrodes on R gastroc, quad, hamstring, and anterior tib for optimum muscle contraction, safe positioning on w/c within frame and cushion for prevention of skin breakdown, feet secured to foot pedals, w/c secured to  w/ Q-straints.  Passive motion assessed to ensure proper positioning.      Pt performed 37 minutes of active FES ergometry with 100% stimulation applied to above muscles at 35 rpm with 1.99 Nm resistance.  This PT adjusted e-stim and cycling parameters in real-time to ensure palpable muscle contractions throughout session.  Please see [email or web address] for further details on patient's stimulation parameters and ergometry outcomes.      Changes in parameters that were part of this treatment:   -None - pt declined to increase resistance    Functional outcomes from this intervention include:   -reduced spasticity  -improved sensory awareness and proprioception  -improved muscle strength  -improved motor coordination    Session Summary:   -Average Power: 4.9W  -Asymmetry: L  17%  -Active Minutes: 32

## 2023-04-29 NOTE — PROGRESS NOTES
"Discharge Planner Post-Acute Rehab OT:      Discharge Plan: Addendum: Home w/ home OT w/ family and 24/7 A     Precautions: Fall, R mir, *Dgtr Kayli okay to transfer/assist with toileting  Hmong speaking patient     Bedside Nursing to perform the following precautions for RUE shoulder subluxation:       No twisting, pulling, or lifting UE above 90 degrees of flexion or \"shoulder height\".    Support UE at all times to prevent further subluxation.    Position UE on arm tray when seated in wheelchair and on pillows when seated or lying in bed. Position UE in a neutral position.    Remove resting hand splint in morning; refer to splint orders for details    Avoid using hemiparetic UE for BP readings and IV/PICC placement when medically appropriate.     Current Status:  ADLs:    Mobility: W/C based. min A SPT toward pt's L side, mod A to the right.     Grooming: Set-up and SBA seated.     Dressing: UB min A, mod A with LBD tasks and footwear.    Bathing: Pt used the rolling shower chair but did SPT toward her L Min A. Pt bathed 5/10 body parts seated.     Toileting: Min A with W/C<>toilet squat pivot transfer with therapy with blocking of RLE; dtr okay to assist with toileting  IADLs: Supportive family  Vision/Cognition: Vision appears functional, screen as appropriate. Pt is disoriented, impaired memory, impaired attention, impaired speech/communication, aphasia possibly and variably impulsive- responds well to safety cues.      Assessment:  Focused on RUE neuro re-education and sensorimotor skills with use of X[employer]; refer to care plan note for further details. Focused on RUE proximal strengthening with isometric strengthening exercises.      Other Barriers to Discharge (DME, Family Training, etc): Has walk in shower on main floor.   DME: shower chair through  home medical-can be covered through [employer] (provide purchase information)  Family training: Daughter trained on FB dressing and toileting. Will need to train " on shower transfers with shower chair

## 2023-04-30 ENCOUNTER — APPOINTMENT (OUTPATIENT)
Dept: PHYSICAL THERAPY | Facility: CLINIC | Age: 53
End: 2023-04-30
Attending: PHYSICAL MEDICINE & REHABILITATION
Payer: COMMERCIAL

## 2023-04-30 ENCOUNTER — APPOINTMENT (OUTPATIENT)
Dept: SPEECH THERAPY | Facility: CLINIC | Age: 53
End: 2023-04-30
Attending: PHYSICAL MEDICINE & REHABILITATION
Payer: COMMERCIAL

## 2023-04-30 ENCOUNTER — APPOINTMENT (OUTPATIENT)
Dept: OCCUPATIONAL THERAPY | Facility: CLINIC | Age: 53
End: 2023-04-30
Attending: PHYSICAL MEDICINE & REHABILITATION
Payer: COMMERCIAL

## 2023-04-30 LAB
LACTATE SERPL-SCNC: 2.1 MMOL/L (ref 0.7–2)
LACTATE SERPL-SCNC: 2.1 MMOL/L (ref 0.7–2)

## 2023-04-30 PROCEDURE — 250N000013 HC RX MED GY IP 250 OP 250 PS 637: Performed by: PHYSICIAN ASSISTANT

## 2023-04-30 PROCEDURE — 97129 THER IVNTJ 1ST 15 MIN: CPT | Mod: GN

## 2023-04-30 PROCEDURE — 128N000003 HC R&B REHAB

## 2023-04-30 PROCEDURE — 36415 COLL VENOUS BLD VENIPUNCTURE: CPT | Performed by: PHYSICAL MEDICINE & REHABILITATION

## 2023-04-30 PROCEDURE — 97130 THER IVNTJ EA ADDL 15 MIN: CPT | Mod: GN

## 2023-04-30 PROCEDURE — 99231 SBSQ HOSP IP/OBS SF/LOW 25: CPT

## 2023-04-30 PROCEDURE — 83605 ASSAY OF LACTIC ACID: CPT | Performed by: PHYSICAL MEDICINE & REHABILITATION

## 2023-04-30 PROCEDURE — 97530 THERAPEUTIC ACTIVITIES: CPT | Mod: GP

## 2023-04-30 PROCEDURE — 97112 NEUROMUSCULAR REEDUCATION: CPT | Mod: GO | Performed by: OCCUPATIONAL THERAPIST

## 2023-04-30 RX ADMIN — SENNOSIDES AND DOCUSATE SODIUM 2 TABLET: 50; 8.6 TABLET ORAL at 20:13

## 2023-04-30 RX ADMIN — ATORVASTATIN CALCIUM 20 MG: 10 TABLET, FILM COATED ORAL at 20:14

## 2023-04-30 ASSESSMENT — ACTIVITIES OF DAILY LIVING (ADL)
ADLS_ACUITY_SCORE: 37

## 2023-04-30 NOTE — PLAN OF CARE
Goal Outcome Evaluation:       Pt A&Ox4. Hmong speaking, daughter translates. Daughter in room all of shift. Per daughter, Pt cont of B&B on shift. A1 SP or roxane. Pt denies pain, SOB, headache, nausea or new numbness/tingling. No new skin issues. Call light in reach, alarms on, calls appropriately.

## 2023-04-30 NOTE — PROGRESS NOTES
Skilled set up on :  Pt dependent for proper placement of electrodes on RUE for optimum muscle contraction, safe positioning on w/c within frame and cushion for prevention of skin breakdown, feet secured to foot pedals, w/c secured to  w/ Q-straints.  Passive motion assessed to ensure proper positioning.       Pt performed 34 minutes of active FES ergometry with 0-100% stimulation applied to above muscles at 25-30 rpm with 0.5-2.05 Nm resistance. This OT adjusted e-stim and cycling parameters in real-time to ensure palpable muscle contractions throughout session.  Please see [email or web address] for further details on patient's stimulation parameters and ergometry outcomes.       Changes in parameters that were part of this treatment:   -no changes made this session     Functional outcomes from this intervention include:   -reduced spasticity  -improved sensory awareness and proprioception  -improved muscle strength  -improved motor coordination

## 2023-04-30 NOTE — PLAN OF CARE
FOCUS/GOAL  Bowel management, Bladder management, Pain management, Mobility, Skin integrity, and Safety management    ASSESSMENT, INTERVENTIONS AND CONTINUING PLAN FOR GOAL:  Patient is alert and oriented. Daughter at the bedside and helpful as interpretor. Patient denied pain,headache, dizziness, CP or SOB. Level 7/thin diet. A-1 roxane steady/SPT w/c. Continent of B/B last BM 04/29. Per daughter patient had BM every day. VS WDL No care concern at this time. Call light is in reach daughter at the bedside.   Goal Outcome Evaluation:

## 2023-04-30 NOTE — PROGRESS NOTES
FOCUS/GOAL  Medical management    ASSESSMENT, INTERVENTIONS AND CONTINUING PLAN FOR GOAL:  Pt and daughter do not wish to be disturbed overnight. Safety checks completed.

## 2023-04-30 NOTE — PROGRESS NOTES
[facility]    Code Sepsis Note  4/30/2023   Time Called: 1745    Code Sepsis called for: lactic acid level of 2.1    Assessment & Plan     # Elevated lactic acid level  - Patient had [facility] trigger nursing staff to draw lactic acid level for initial oral temperature of 96.5F at 1554. Follow up oral temperature at 1636 was 98.3F without intervention. Lactic acid level was 2.1 on 1714 blood draw.     On assessment the patient was sitting up in a chair with her  at bedside. She denied any fever, chills, abdominal pain, diarrhea, vomiting, did not appear dehydrated, did not have any respiratory symptoms that would indicate that she may have an underlying respiratory infection, stated herself with verification through her bedside nurse that she was eating and drinking adequate amounts of food and liquid, and denied any urinary discomfort that would indicate to this provider that she may have a UTI.      INTERVENTIONS:  - No interventions necessary at this time.    At the end of the Code Sepsis no interventions were ordered.    Discussed with and defer further cares to patient's primary team    Interval History     Mercy Osman is a 52 year old right hand dominant female with a past medical history of hypertension, [place]' palsy who was admitted on 4/8/23 with spontaneous acute left thalamic intracranial hemorrhage with hospital course complicated by hyperlipidemia, prediabetes, elevated troponin, anemia, hypokalemia, and loose stools.  She is now admitted to ARU on 4/14/23 for multidisciplinary rehabilitation and ongoing medical management.       Code Status: Full Code    Allergies   No Known Allergies    Physical Exam   Vital Signs with Ranges:  Temp:  [96.5  F (35.8  C)-99.1  F (37.3  C)] 97.8  F (36.6  C)  Pulse:  [82-98] 98  Resp:  [16-20] 20  BP: ()/(66-85) 132/85  SpO2:  [99 %] 99 %  No intake/output data recorded.    Exam:  Constitutional: alert, no distress,  cooperative and smiling  Head: Normocephalic. No masses, lesions, tenderness or abnormalities  Neck: Neck supple. No adenopathy. Thyroid symmetric, normal size,  ENT: ENT exam normal, no neck nodes or sinus tenderness  Cardiovascular: PMI normal. No lifts, heaves, or thrills. RRR. No murmurs, clicks gallops or rub  Respiratory: Percussion normal. Good diaphragmatic excursion. Lungs clear  Gastrointestinal: Abdomen soft, non-tender. BS normal. No masses, organomegaly  : Deferred  Musculoskeletal: extremities normal- no gross deformities noted and sitting up unassisted in a chair  Psychiatric: mentation appears normal,  translating for patient and stated that she is at her baseline      Data     ABG:  -No lab results found in last 7 days.    Troponin:    No lab results found.    IMAGING: (X-ray/CT/MRI)   No results found for this or any previous visit (from the past 24 hour(s)).    CBC with Diff:  Recent Labs   Lab Test 04/24/23  0557 04/09/23  0025 04/08/23  0032   WBC 10.1   < > 6.7   HGB 12.4   < > 11.5*   MCV 84   < > 82      < > 287   INR  --   --  0.99    < > = values in this interval not displayed.        Lactic Acid:    Lab Results   Component Value Date    LACT 2.1 04/30/2023           Comprehensive Metabolic Panel:  Recent Labs   Lab 04/24/23  0557      POTASSIUM 4.7   CHLORIDE 102   CO2 26   ANIONGAP 12   GLC 98   BUN 12.0   CR 0.69   GFRESTIMATED >90   SALAZAR 10.2*       INR:    Recent Labs   Lab Test 04/08/23  0032   INR 0.99       D-DIMER:  No results found for: DIMER    BNP:  No results found for: BNP    UA:  No results for input(s): COLOR, APPEARANCE, URINEGLC, URINEBILI, URINEKETONE, SG, UBLD, URINEPH, PROTEIN, UROBILINOGEN, NITRITE, LEUKEST, RBCU, WBCU in the last 168 hours.    Time Spent on this Encounter   I spent 10 minutes on the unit/floor managing the care of Mercy Osman. Over 50% of my time was spent counseling the patient and/or coordinating care regarding services listed in  this note.    HUMBLE Ghotra CNP

## 2023-04-30 NOTE — PLAN OF CARE
Discharge Planner Post-Acute Rehab SLP:      Discharge Plan: Home with 24/7 supervision from family, ongoing speech therapy      Precautions: Disoriented, Mod-severe cognitive impairment, Hmong speaking (family interprets), falls risk, R hemiparesis     Current Status:  Hearing: Mild hearing loss per family report  Vision: WFL  Communication: Mild to moderate word finding difficulties  Cognition: Moderate-Severe Cognitive impairment  Swallow: Easy to Chew (7) and Thin Liquids (0). Patient and family indicate that patient prefers softer foods. Will monitor if patient presents with any future difficulties- denied difficulties 04/23     Assessment:  Pt performed verbal sequencing task for common tasks with moderate cues for expansion of detai.  Pt demonstrated variable independence with some task steps being very detailed.  Other tasks required moderate prompting for expansion of steps to include improved detail.    PM:  Pt's  present for session and assisted with translation.  Pt also initiated increased communication in English. Pt performed a visually presented problem solving task x 100% accuracy with min cues.  SLP facilitated training in functional memory strategies (visualization, association) with pt utilizing the strategies in a visually presented recall task with moderate cues.    Other Barriers to Discharge (Family Training, etc): family training

## 2023-04-30 NOTE — PROGRESS NOTES
"Discharge Planner Post-Acute Rehab OT:      Discharge Plan: Addendum: Home w/ OP OT d/t insurance barrier for HC. Home w/ family and 24/7 A      Precautions: Fall, R mir, *Dgtr Kayli okay to transfer/assist with toileting  Hmong speaking patient     Bedside Nursing to perform the following precautions for RUE shoulder subluxation:       No twisting, pulling, or lifting UE above 90 degrees of flexion or \"shoulder height\".    Support UE at all times to prevent further subluxation.    Position UE on arm tray when seated in wheelchair and on pillows when seated or lying in bed. Position UE in a neutral position.    Remove resting hand splint in morning; refer to splint orders for details    Avoid using hemiparetic UE for BP readings and IV/PICC placement when medically appropriate.     Current Status:  ADLs:    Mobility: W/C based. min A SPT toward pt's L side, mod A to the right.     Grooming: Set-up and SBA seated.     Dressing: UB min A, mod A with LBD tasks and footwear.    Bathing: Pt used the rolling shower chair but did SPT toward her L Min A. Pt bathed 5/10 body parts seated.     Toileting: Min A with W/C<>toilet squat pivot transfer with therapy with blocking of RLE; dtr okay to assist with toileting  IADLs: Supportive family  Vision/Cognition: Vision appears functional, screen as appropriate. Pt is disoriented, impaired memory, impaired attention, impaired speech/communication, aphasia possibly and variably impulsive- responds well to safety cues.      Assessment: Utilized FES for RUE neuro re-education and sensorimotor skills; refer to care plan note for further details.      Other Barriers to Discharge (DME, Family Training, etc): Has walk in shower on main floor.   DME: shower chair through [facility]-can be covered through [place], [place] (provide purchase information)  Family training: Daughter trained on FB dressing and toileting. Will need to train on shower transfers with shower chair          "

## 2023-04-30 NOTE — PLAN OF CARE
FOCUS/GOAL  Bowel management, Bladder management, Pain management, Mobility, Skin integrity, and Safety management    ASSESSMENT, INTERVENTIONS AND CONTINUING PLAN FOR GOAL:  Patient is alert and oriented. Hmong speaker  is at the bed side. Patient denied pain,headache, dizziness, CP or SOB. No chills no fever, denied nausea no vomiting. Triggered sepsis, lactic acid 2.1 Code sepsis called and on call provider notified. Patient was assessed by code sepsis team, no concern of infection at this time per NP. VS rechecked and WDL. Fluid given and encouraged. daughter is at the bed side. Continent of B/B last Bm 04/30. No care concern at this time. Staff will continue per POC.   Goal Outcome Evaluation:

## 2023-04-30 NOTE — PLAN OF CARE
Discharge Planner Post-Acute Rehab PT:      Discharge Plan: home with  family assistance, home PT     Precautions: alarms, R hemiparesis,  Do not leave on commode/EOB alone,  Hmong       Current Status: *Dgtr Kayli okay to transfer/assist with toileting  Bed Mobility: Min-A  Transfer: Min-A pivot B direction  Gait: up to 40 ft CGA/min-A WBQC R AFO.  Stairs: Min-A single rail     [place]once/56 on  on   PASS:    on  on      Assessment: Emotional today, expressing anger and sadness around diagnosis. Psychology referral requested from MD. In therapy, working on safe approach to multiple surfaces. Need to progress to stair safety without rail for home,    Other Barriers to Discharge (DME, Family Training, etc):   AFO  [employer]  W/c - K3 rental  Stairs to enter - 2 w/o rail  Family Training

## 2023-05-01 ENCOUNTER — APPOINTMENT (OUTPATIENT)
Dept: SPEECH THERAPY | Facility: CLINIC | Age: 53
End: 2023-05-01
Attending: PHYSICAL MEDICINE & REHABILITATION
Payer: COMMERCIAL

## 2023-05-01 ENCOUNTER — APPOINTMENT (OUTPATIENT)
Dept: OCCUPATIONAL THERAPY | Facility: CLINIC | Age: 53
End: 2023-05-01
Attending: PHYSICAL MEDICINE & REHABILITATION
Payer: COMMERCIAL

## 2023-05-01 ENCOUNTER — APPOINTMENT (OUTPATIENT)
Dept: PHYSICAL THERAPY | Facility: CLINIC | Age: 53
End: 2023-05-01
Attending: PHYSICAL MEDICINE & REHABILITATION
Payer: COMMERCIAL

## 2023-05-01 LAB
ANION GAP SERPL CALCULATED.3IONS-SCNC: 13 MMOL/L (ref 7–15)
BUN SERPL-MCNC: 13.1 MG/DL (ref 6–20)
CALCIUM SERPL-MCNC: 10.4 MG/DL (ref 8.6–10)
CHLORIDE SERPL-SCNC: 103 MMOL/L (ref 98–107)
CREAT SERPL-MCNC: 0.7 MG/DL (ref 0.51–0.95)
DEPRECATED HCO3 PLAS-SCNC: 25 MMOL/L (ref 22–29)
ERYTHROCYTE [DISTWIDTH] IN BLOOD BY AUTOMATED COUNT: 15.9 % (ref 10–15)
GFR SERPL CREATININE-BSD FRML MDRD: >90 ML/MIN/1.73M2
GLUCOSE SERPL-MCNC: 107 MG/DL (ref 70–99)
HCT VFR BLD AUTO: 39.8 % (ref 35–47)
HGB BLD-MCNC: 12.3 G/DL (ref 11.7–15.7)
MCH RBC QN AUTO: 26.1 PG (ref 26.5–33)
MCHC RBC AUTO-ENTMCNC: 30.9 G/DL (ref 31.5–36.5)
MCV RBC AUTO: 84 FL (ref 78–100)
PLATELET # BLD AUTO: 377 10E3/UL (ref 150–450)
POTASSIUM SERPL-SCNC: 4.1 MMOL/L (ref 3.4–5.3)
RBC # BLD AUTO: 4.72 10E6/UL (ref 3.8–5.2)
SODIUM SERPL-SCNC: 141 MMOL/L (ref 136–145)
WBC # BLD AUTO: 9.2 10E3/UL (ref 4–11)

## 2023-05-01 PROCEDURE — 97535 SELF CARE MNGMENT TRAINING: CPT | Mod: GO | Performed by: OCCUPATIONAL THERAPIST

## 2023-05-01 PROCEDURE — 36415 COLL VENOUS BLD VENIPUNCTURE: CPT | Performed by: PHYSICIAN ASSISTANT

## 2023-05-01 PROCEDURE — 85027 COMPLETE CBC AUTOMATED: CPT | Performed by: PHYSICIAN ASSISTANT

## 2023-05-01 PROCEDURE — 80048 BASIC METABOLIC PNL TOTAL CA: CPT | Performed by: PHYSICIAN ASSISTANT

## 2023-05-01 PROCEDURE — 97530 THERAPEUTIC ACTIVITIES: CPT | Mod: GP

## 2023-05-01 PROCEDURE — 92507 TX SP LANG VOICE COMM INDIV: CPT | Mod: GN

## 2023-05-01 PROCEDURE — 250N000013 HC RX MED GY IP 250 OP 250 PS 637: Performed by: PHYSICIAN ASSISTANT

## 2023-05-01 PROCEDURE — 99232 SBSQ HOSP IP/OBS MODERATE 35: CPT | Mod: FS | Performed by: PHYSICIAN ASSISTANT

## 2023-05-01 PROCEDURE — 97112 NEUROMUSCULAR REEDUCATION: CPT | Mod: GO | Performed by: OCCUPATIONAL THERAPIST

## 2023-05-01 PROCEDURE — 97112 NEUROMUSCULAR REEDUCATION: CPT | Mod: GP

## 2023-05-01 PROCEDURE — 128N000003 HC R&B REHAB

## 2023-05-01 RX ADMIN — SENNOSIDES AND DOCUSATE SODIUM 2 TABLET: 50; 8.6 TABLET ORAL at 20:29

## 2023-05-01 RX ADMIN — ATORVASTATIN CALCIUM 20 MG: 10 TABLET, FILM COATED ORAL at 20:29

## 2023-05-01 RX ADMIN — LISINOPRIL 20 MG: 20 TABLET ORAL at 09:02

## 2023-05-01 RX ADMIN — AMLODIPINE BESYLATE 10 MG: 10 TABLET ORAL at 09:02

## 2023-05-01 ASSESSMENT — ACTIVITIES OF DAILY LIVING (ADL)
ADLS_ACUITY_SCORE: 41
ADLS_ACUITY_SCORE: 37
ADLS_ACUITY_SCORE: 41
ADLS_ACUITY_SCORE: 37
ADLS_ACUITY_SCORE: 41
ADLS_ACUITY_SCORE: 41
ADLS_ACUITY_SCORE: 37
ADLS_ACUITY_SCORE: 37
ADLS_ACUITY_SCORE: 41
ADLS_ACUITY_SCORE: 37

## 2023-05-01 NOTE — PROGRESS NOTES
"Discharge Planner Post-Acute Rehab OT:      Discharge Plan: Addendum: Home w/ OP OT d/t insurance barrier for HC. Home w/ family and 24/7 A      Precautions: Fall, R mir, *Dgtr Kayli okay to transfer/assist with toileting  Hmong speaking patient     Bedside Nursing to perform the following precautions for RUE shoulder subluxation:       No twisting, pulling, or lifting UE above 90 degrees of flexion or \"shoulder height\".    Support UE at all times to prevent further subluxation.    Position UE on arm tray when seated in wheelchair and on pillows when seated or lying in bed. Position UE in a neutral position.    Remove resting hand splint in morning; refer to splint orders for details    Avoid using hemiparetic UE for BP readings and IV/PICC placement when medically appropriate.     Current Status:  ADLs:    Mobility: W/C based. min A SPT toward pt's L side, mod A to the right.     Grooming: Set-up and SBA seated.     Dressing: UB min A, mod A with LBD tasks and footwear.    Bathing: CGA/min A with W/C<>shower chair stand pivot transfer. Pt bathed 5/10 body parts seated.     Toileting: Min A with W/C<>toilet squat pivot transfer with therapy with blocking of RLE; dtr okay to assist with toileting  IADLs: Supportive family  Vision/Cognition: Vision appears functional, screen as appropriate. Pt is disoriented, impaired memory, impaired attention, impaired speech/communication, aphasia possibly and variably impulsive- responds well to safety cues.      Assessment: Focused on simulating shower transfer with shower chair W/C based-daughter able to safely provide CGA/min A. Will order shower chair for home through [place]. Utilized Xcite for RUE neuro re-education and sensorimotor skills with hand library; refer to care plan note for further details.       Other Barriers to Discharge (DME, Family Training, etc): Has walk in shower on main floor.   DME: shower chair through  home medical-can be covered through [place], NMES " (provided daughter purchase information and MD script)  Family training: Daughter trained on FB dressing and toileting. Will need to train on shower transfers with shower chair

## 2023-05-01 NOTE — PLAN OF CARE
"  VS: Blood pressure 117/73, pulse 81, temperature 97  F (36.1  C), temperature source Oral, resp. rate 20, height 1.473 m (4' 10\"), weight 49 kg (108 lb 0.4 oz), SpO2 98 %.   O2: 98% on RA.    Output: Incontinent of B&B.    Last BM: 04/30/23   Activity: A1 SPT or A1 with roxane readjanelle. Daughter can assist with transfers.    Skin: Intact. R forearm bruise.   Pain: Denies   CMS: AOX4. Unable to accurately assess. Pt is Hmong speaking. Daughter in documented .    Dressing: N/A   Diet: Level 7, easy to chew diet, thin liquids, takes pills whole with water.    LDA: N/A.   Plan: Continue POC.   Additional Info: Daughter serves as pt's . Daughter can also assist with transfers.                          "

## 2023-05-01 NOTE — PLAN OF CARE
Discharge Planner Post-Acute Rehab PT:      Discharge Plan: home with  family assistance, home PT     Precautions: alarms, R hemiparesis,  Do not leave on commode/EOB alone,  Hmong       Current Status: *Dgtr Kayli okay to transfer/assist with toileting  Bed Mobility: Carlos>CGA  Transfer: CGA pivot B direction  Gait: up to 40 ft CGA/min-A WBQC R AFO.  Stairs: Min-A single rail     [place]/56 on  on   PASS:    on  on      Assessment: Practice w/ short distance amb bouts w/ decreased reliance on visual cues for foot placement.     Other Barriers to Discharge (DME, Family Training, etc):   AFO  [employer]  W/c - K3 rental  Stairs to enter - 2 w/o rail  Family Training

## 2023-05-01 NOTE — PLAN OF CARE
Discharge Planner Post-Acute Rehab SLP:      Discharge Plan: Home with 24/7 supervision from family, ongoing speech therapy      Precautions: Disoriented, Mod-severe cognitive impairment, Hmong speaking (family interprets), falls risk, R hemiparesis     Current Status:  Hearing: Mild hearing loss per family report  Vision: WFL  Communication: Mild to moderate word finding difficulties  Cognition: Moderate-Severe Cognitive impairment  Swallow: Easy to Chew (7) and Thin Liquids (0). Patient and family indicate that patient prefers softer foods. Will monitor if patient presents with any future difficulties- denied difficulties 04/23     Assessment:   Pt participated in ongoing verbal expression and reasoning tasks. Pt with ongoing difficulty reasoning more complex/abstract concepts but improved with greater context and semantic cues. Pt participated in RET given minimally contrastive image, required to express concepts within image. Pt with significant difficulty with expression of emotions within scene and reasoning through situation    Other Barriers to Discharge (Family Training, etc): family training

## 2023-05-01 NOTE — PROGRESS NOTES
Met with pt and pt dtr. Educated dtr on process to get PCA. Pt and pt dtr in agreement w/ SWer calling [facility] on the day of discharge to initiate process for [facility] [place]. Will plan to complete this before pt discharge home. Encouraged pt dtr to call [place] [employer] [facility] to learn more about process for her to become PCA. Provided pt and pt dtr with printed information for [place] [employer] [facility], [facility] [place], and [facility]  information. Dtr also give SW business card if additional questions or concerns arise.     Will remain available and continue to follow.     Sylvia Turner [facility]   [facility]   Direct Phone: [phone]  I   Pager: [phone]  I  Fax: [phone]

## 2023-05-01 NOTE — PROGRESS NOTES
XCite stim unit for Activity Based Therapy. Skilled set up; determined appropriate FES parameters for each muscle group based off of strong tetanic response of muscle test.  Used the following activities from the software library: Hand  Intervention and patient response: pt completed 50 repetitions x2 sets with lumbrical grasp with writer guiding through transitional movements. Pt completed 50 repetitions x1 set and 25 repetitions x1 set with lateral pinch with writer guiding through transitional movements.

## 2023-05-01 NOTE — PROGRESS NOTES
"  [facility]   Acute Rehabilitation Unit  Daily progress note    INTERVAL HISTORY  Weekend and therapy notes reviewed, no acute events reported.  Did trigger sepsis protocol, lactic acid mildly elevated at 2.1; evaluated by code team per protocol, no evidence of infection and no indication for interventions per their assessment.  Therapy team also noted patient was more emotional yesterday, psychology consult placed (already had been with plans to see this week).    Mercy Osman was seen and examined this morning with daughter present,  assisting via phone.  Patient reports she is feeling \"so-so\".  Reports to be sleeping well.  Notes some ongoing numbness/tingling in right arm, but no associated pain or discomfort.  She denies chest pain, shortness of breath, dizziness, nausea.  Denies other concerns or questions at this time.    Functionally, she is currently needing min A for bed mobility, min A for pivot transfers, CGA to min A for ambulating 40' with WBQC and R AFO.      MEDICATIONS    amLODIPine  10 mg Oral Daily     atorvastatin  20 mg Oral QPM     lisinopril  20 mg Oral Daily     polyethylene glycol  17 g Oral Daily     senna-docusate  2 tablet Oral At Bedtime        acetaminophen, diclofenac, hydrALAZINE, melatonin     PHYSICAL EXAM  /73 (BP Location: Left arm)   Pulse 81   Temp 97  F (36.1  C) (Oral)   Resp 20   Ht 1.473 m (4' 10\")   Wt 49 kg (108 lb 0.4 oz)   SpO2 98%   BMI 22.58 kg/m     Gen: NAD  Cardio: RRR, no murmurs  Pulm: non-labored on room air, lungs CTA bilaterally  Abd: soft, non-tender, non-distended, bowel sounds present  Ext: no appreciable edema in bilat lower extremities  Neuro/MSK: awake, alert, PERRL, left upper facial weakness, right lower facial droop, RUE SF 2/5, EE 3/5, EF 3/5, WE 2/5,  3/5; RLE HF 4-/5, KE 4+/5, DF 4/5, PF 4+/5.  No tone noted on right.       LABS  CBC RESULTS:   Recent Labs   Lab Test 04/24/23  0557 " 04/17/23  0945 04/14/23  0803   WBC 10.1 8.1 7.5   RBC 4.79 5.12 5.44*   HGB 12.4 12.9 13.7   HCT 40.2 42.6 45.1   MCV 84 83 83   MCH 25.9* 25.2* 25.2*   MCHC 30.8* 30.3* 30.4*   RDW 16.2* 16.5* 16.7*    388 340     Last Basic Metabolic Panel:  Recent Labs   Lab Test 04/24/23  0557 04/17/23  0945 04/14/23  0828 04/14/23  0803    139  --  139   POTASSIUM 4.7 3.5  --  4.0   CHLORIDE 102 104  --  106   CO2 26 23  --  20*   ANIONGAP 12 12  --  13   GLC 98 182* 97 107*   BUN 12.0 12.4  --  12.4   CR 0.69 0.71  --  0.76   GFRESTIMATED >90 >90  --  >90   SALAZAR 10.2* 10.0  --  9.6       Rehabilitation - continue comprehensive acute inpatient rehabilitation program with multidisciplinary approach including therapies, rehab nursing, and physiatry following. See interval history for updates.      ASSESSMENT AND PLAN  Mercy Osman is a 52 year old right hand dominant female with a past medical history of hypertension, [place]' palsy who was admitted on 4/8/23 with spontaneous acute left thalamic intracranial hemorrhage with hospital course complicated by hyperlipidemia, prediabetes, elevated troponin, anemia, hypokalemia, and loose stools.  She is now admitted to ARU on 4/14/23 for multidisciplinary rehabilitation and ongoing medical management.        Admission to acute inpatient rehab 4/14/23.    Impairment group code: Stroke Vascular Hemorrhagic 01.2 (R) Body Involvement (L) Brain -  left basal ganglia hemorrhage        1. PT, OT and SLP 60 minutes of each on a daily basis, in addition to rehab nursing and close management of physiatrist.       2. Impairment of ADL's: Noted to have impaired activity tolerance, impaired balance, impaired coordination, impaired ROM, impaired sensation, impaired strength w/ R hemiparesis, impaired motor control, fatigue, dizziness, and impaired weight shifting, all affecting her ability to safely and independently perform basic ADLs.  Goal for min A or less with basic  ADLs.     3. Impairment of mobility:  Noted to have impaired activity tolerance, impaired balance, impaired coordination, impaired ROM, impaired sensation, impaired strength w/ R hemiparesis, impaired motor control, fatigue, dizziness, and impaired weight shifting, all affecting her ability to safely and independently perform basic mobility.  Goal for min A or less with basic mobility.     4. Impairment of cognition/language/swallow:  Noted to have dysarthria and dysphagia, and would benefit from full cognitive-linguistic evaluation at ARU with goals for improved cognitive-linguistic skills to meet basic needs and safe tolerance of least restrictive diet.     5. Medical Conditions  New actions/orders/updates for today are in blue.     Left basal ganglia intraparenchymal hemorrhage, spontaneous, likely secondary to uncontrolled hypertension  Vasogenic cerebral edema  Deficits: right hemiparesis, right facial droop, right hemisensory deficit, right visual neglect, dysarthria, ?aphasia, dysphagia, suspect impaired cognition  - Long-term BP goal 140/90 or less, management as below  - Stroke PLC completed 4/10 (during inpatient stay)  - 4/18 noting some generalized right-sided pain but unable to further describe.  ?central.  Also with significant R shoulder subluxation. Continue PRN Tylenol and added PRN voltaren.  - Continue PT/OT/SLP  - Follow up with neurology 6-8 weeks after your discharge      Hypertension  PTA on amlodipine-benazepril 10-20 mg capsule.  BP elevated to 220s/100s at presentation. Initially managed on nicardipine drip, then transitioned to PO lisinopril + amlodipine.  - Long-term BP goal 140/90 or less  - Continue Lisinopril 20mg daily  - Continue amlodipine 10mg daily  - PRN hydralazine for SBP >160  - Monitor BP, adjust regimen as indicated     Hyperlipidemia  Started on statin therapy this admission with .  - Continue atorvastatin 20 mg daily     Troponin elevation, asymptomatic,  "spontaneously resolved  EKG without obvious acute ischemic changes.  Echo WNL.  No cardiogenic symptoms, suspect transient stress related troponin leak.  Peaked to 113 on 4/10.  Troponin improved spontaneously without intervention.  - Further work-up if any cardiogenic symptoms develop     Hypokalemia, resolved s/p replacement  Hyperchloremia, resolved with adequate hydration    - Trend BMP every Monday.  : K WNL 4.1, Cl WNL at 103  - Replete as needed per protocol     Pre-diabetes   Hgb A1c 6.2% 2023   - Monitor with surveillance with primary care outpatient  - No routine BG checks indicated, monitor periodically with BMP     Anemia, chronic   Hgb 11.5 on presentation.  WNL at 13.4 as of .  - Hgb goal >7, plt goal >50k  - Trend CBC every Monday.  : Hgb stable at 12.3    Major Depressive Disorder, recurrent episode, moderate  SW intake with PHQ-9 score 18, did indicate some passive suicidal ideation.  Per family, have noted some symptoms of depression since patient's son  of suicide ~3 years ago.  - Psychiatry consulted, appreciate assistance.  Met with patient .  Discussed potential medication trial, but patient would like to \"think about it\".  If agreeable, psychiatry recommending Remeron 7.5 mg at HS.  - Revisited with patient .  She would prefer to hold off on medication trial, but is now open to psychology, consulted and attempted to see patient on  though scheduling problem, will now see this week.  - Therapy team noted patient more emotional .  As above, psych planning to see, can revisit option to start anti-depressant medication as well.       6. Adjustment to disability:  Clinical psychology to eval and treat if indicated  7. FEN: easy to chew (level 7) diet, thin liquids  8. Bowel: continent/incontinent, monitor, PRN and scheduled bowel meds available  9. Bladder: continent/incontinent  10. DVT Prophylaxis: mechanical  11. GI Prophylaxis: not indicated  12. Code: " full  13. Disposition: home with family support  14. ELOS:  target 5/5/23  15. Follow up Appointments on Discharge: PCP in 1-2 weeks, neurology in 6-8 weeks after discharge      Patient was discussed with Dr. Erasmo Diaz, PM&R staff physician     Fabi Virgen PA-C  Physical Medicine & Rehabilitation

## 2023-05-02 ENCOUNTER — APPOINTMENT (OUTPATIENT)
Dept: SPEECH THERAPY | Facility: CLINIC | Age: 53
End: 2023-05-02
Attending: PHYSICAL MEDICINE & REHABILITATION
Payer: COMMERCIAL

## 2023-05-02 ENCOUNTER — APPOINTMENT (OUTPATIENT)
Dept: INTERPRETER SERVICES | Facility: CLINIC | Age: 53
End: 2023-05-02
Attending: PHYSICAL MEDICINE & REHABILITATION
Payer: COMMERCIAL

## 2023-05-02 ENCOUNTER — APPOINTMENT (OUTPATIENT)
Dept: PHYSICAL THERAPY | Facility: CLINIC | Age: 53
End: 2023-05-02
Attending: PHYSICAL MEDICINE & REHABILITATION
Payer: COMMERCIAL

## 2023-05-02 ENCOUNTER — APPOINTMENT (OUTPATIENT)
Dept: OCCUPATIONAL THERAPY | Facility: CLINIC | Age: 53
End: 2023-05-02
Attending: PHYSICAL MEDICINE & REHABILITATION
Payer: COMMERCIAL

## 2023-05-02 PROCEDURE — 97530 THERAPEUTIC ACTIVITIES: CPT | Mod: GP

## 2023-05-02 PROCEDURE — 97112 NEUROMUSCULAR REEDUCATION: CPT | Mod: GO | Performed by: OCCUPATIONAL THERAPIST

## 2023-05-02 PROCEDURE — 99232 SBSQ HOSP IP/OBS MODERATE 35: CPT | Mod: FS | Performed by: PHYSICIAN ASSISTANT

## 2023-05-02 PROCEDURE — 97750 PHYSICAL PERFORMANCE TEST: CPT | Mod: GP

## 2023-05-02 PROCEDURE — 128N000003 HC R&B REHAB

## 2023-05-02 PROCEDURE — 92507 TX SP LANG VOICE COMM INDIV: CPT | Mod: GN

## 2023-05-02 PROCEDURE — 97130 THER IVNTJ EA ADDL 15 MIN: CPT | Mod: GN

## 2023-05-02 PROCEDURE — 97129 THER IVNTJ 1ST 15 MIN: CPT | Mod: GN

## 2023-05-02 PROCEDURE — 97116 GAIT TRAINING THERAPY: CPT | Mod: GP

## 2023-05-02 PROCEDURE — 250N000013 HC RX MED GY IP 250 OP 250 PS 637: Performed by: PHYSICIAN ASSISTANT

## 2023-05-02 RX ADMIN — ATORVASTATIN CALCIUM 20 MG: 10 TABLET, FILM COATED ORAL at 21:25

## 2023-05-02 RX ADMIN — AMLODIPINE BESYLATE 10 MG: 10 TABLET ORAL at 08:11

## 2023-05-02 RX ADMIN — LISINOPRIL 20 MG: 20 TABLET ORAL at 08:11

## 2023-05-02 RX ADMIN — SENNOSIDES AND DOCUSATE SODIUM 2 TABLET: 50; 8.6 TABLET ORAL at 21:25

## 2023-05-02 ASSESSMENT — ACTIVITIES OF DAILY LIVING (ADL)
ADLS_ACUITY_SCORE: 41

## 2023-05-02 NOTE — PROGRESS NOTES
Skilled set up on :  Pt dependent for proper placement of electrodes on RUE for optimum muscle contraction, safe positioning on w/c within frame and cushion for prevention of skin breakdown, feet secured to foot pedals, w/c secured to  w/ Q-straints.  Passive motion assessed to ensure proper positioning.       Pt performed 34 minutes of active FES ergometry with 0-100% stimulation applied to above muscles at 25-30 rpm with 0.5-0.81 Nm resistance. This OT adjusted e-stim and cycling parameters in real-time to ensure palpable muscle contractions throughout session. Please see [email or web address] for further details on patient's stimulation parameters and ergometry outcomes.       Changes in parameters that were part of this treatment:   -resistance      Functional outcomes from this intervention include:   -reduced spasticity  -improved sensory awareness and proprioception  -improved muscle strength  -improved motor coordination

## 2023-05-02 NOTE — PROGRESS NOTES
05/02/23 0900   Signing Clinician's Name / Credentials   Signing clinician's name / credentials Manolo Reecekvng DPT   Ponce Balance Scale (KVNG ROMAN, SONIA S, LUDY VARELA, CHEN ROSS: MEASURING BALANCE IN THE ELDERLY: VALIDATION OF AN INSTRUMENT. CAN. J. PUB. HEALTH, JULY/AUGUST SUPPLEMENT 2:S7-11, 1992.)   Sit To Stand 4   Standing Unsupported 3   Sitting Unsupported 4   Stand to Sit 4   Transfers 2   Standing with Eyes Closed 3   Standing Unsupported, Feet Together 3   Reach Forward With Outstretched Arm 3   Retrieve Object From Floor 3   Turning to Look Behind 2   Turn 360 Degrees 1   Placing Alternate Foot on Stool (4-6 inches) 1   Unsupported Tandem Stand (Demonstrate to Subject) 3   One Leg Stand 3   Total Score (A score of 45 or less has been correlated with an increased risk of falls)   Total Score (out of 56) 39     Ponce Balance Scale (BBS) Cutoff Scores: A score of < 45 /56 indicates an increased risk for falls.     The BBS is a measure of static and dynamic standing balance that has been validated in community dwelling elderly individuals and individuals who have Parkinson's Disease, MS, and those who are s/p CVA and TBI. The test is administered without an assistive device. Scores from the Ponce are used to determine the probability of falling based on the patient's previous history of falls and their test performance.     Minimal Detectable Change = 6.5   & Minimal Detectable Change (Parkinson's Disease) = 5 according to Og & Dusitney 2008    Assessment (rationale for performing, application to patient s function & care plan): Up from 20/56 previous week, still increased difficulty with more dynamic tasks.  (Minutes billed as physical performance test)

## 2023-05-02 NOTE — PLAN OF CARE
Discharge Planner Post-Acute Rehab SLP:      Discharge Plan: Home with 24/7 supervision from family, ongoing speech therapy      Precautions: Disoriented, Mod-severe cognitive impairment, Hmong speaking (family interprets), falls risk, R hemiparesis     Current Status:  Hearing: Mild hearing loss per family report  Vision: WFL  Communication: Mild to moderate word finding difficulties  Cognition: Moderate-Severe Cognitive impairment  Swallow: Easy to Chew (7) and Thin Liquids (0). Patient and family indicate that patient prefers softer foods. Will monitor if patient presents with any future difficulties- denied difficulties 04/23     Assessment:  Pt engaged in further verbal expression/reasoning task via RET. Pt with improved reasoning this date within context but continued to benefit from semantic cues. Pt able to describe and identify all features within visual scene with min cues. required more instances of mod cues to describe more complex features within scene    Other Barriers to Discharge (Family Training, etc): family training

## 2023-05-02 NOTE — PROGRESS NOTES
"Discharge Planner Post-Acute Rehab OT:      Discharge Plan: Addendum: Home w/ OP OT d/t insurance barrier for HC. Home w/ family and 24/7 A      Precautions: Fall, R mir, *Dgtr Kayli okay to transfer/assist with toileting  Hmong speaking patient     Bedside Nursing to perform the following precautions for RUE shoulder subluxation:       No twisting, pulling, or lifting UE above 90 degrees of flexion or \"shoulder height\".    Support UE at all times to prevent further subluxation.    Position UE on arm tray when seated in wheelchair and on pillows when seated or lying in bed. Position UE in a neutral position.    Remove resting hand splint in morning; refer to splint orders for details    Avoid using hemiparetic UE for BP readings and IV/PICC placement when medically appropriate.     Current Status:  ADLs:    Mobility: W/C based. min A SPT toward pt's L side, mod A to the right.     Grooming: Set-up and SBA seated.     Dressing: UB min A, mod A with LBD tasks and footwear.    Bathing: CGA/min A with W/C<>shower chair stand pivot transfer. Pt bathed 5/10 body parts seated.     Toileting: CGA with W/C<>toilet squat pivot transfer, dtr okay to assist with toileting  IADLs: Supportive family  Vision/Cognition: Vision appears functional, screen as appropriate. Pt is disoriented, impaired memory, impaired attention, impaired speech/communication, aphasia possibly and variably impulsive- responds well to safety cues.      Assessment: Utilized FES for RUE neuro re-education and sensorimotor skills; refer to care plan note for further details.      Other Barriers to Discharge (DME, Family Training, etc): Has walk in shower on main floor.   DME: shower chair through [facility]-can be covered through [employer] (provided daughter purchase information and MD script)  Family training: Daughter trained on FB dressing and toileting. Will need to train on shower transfers with shower chair  "

## 2023-05-02 NOTE — PLAN OF CARE
Alert and oriented. Denies SOB, N/V, and CP. Continent of B/B. LBM 5/1/2023 per pt report. Ax1 SPT w/c based. Daughter can assist to transfer patient. Level 7 easy to chew diet, thin liquids, and takes pills whole. Daughter remain at bedside. No report of pain. Call light within reach. Continue with POC.

## 2023-05-02 NOTE — PLAN OF CARE
"Goal Outcome Evaluation:      Plan of Care Reviewed With: patient    Outcome Evaluation: Pt's vitals stable. Denied pain. Tolerating current easy to chew diet. Still with right sided weakness more on UE than LE. Hmong speaking. Able to state name & , that she is in \"a hospital\" but she doesn't know the name, she said that she is here \"to excersise\" and that it ismorning. Unable to state date today. Min assist with standing from sitting using a gait belt and quad cane. She uses an AFO when walking with therapy. Pt's daughter in room and involved in cares.      "

## 2023-05-02 NOTE — PROGRESS NOTES
"  [facility]   Acute Rehabilitation Unit  Daily progress note    INTERVAL HISTORY  Mercy Osman was seen and examined this afternoon with daughter present,  assisting via phone.  No acute events reported overnight.  Patient reports that she is feeling \"about the same\".  Denies chest pain, palpitations, headache, dizziness, shortness of breath, nausea.  States she is sleeping well.  Reports that her only problem is her \"right arm\".  Asks about ongoing stroke recovery.  Reviewed plans for discharge on Friday.    Functionally, she is needing supervision with bed mobility, CGA for pivot transfers, CGA for ambulation 100' with SBQC, and min A with SBQC.      MEDICATIONS    amLODIPine  10 mg Oral Daily     atorvastatin  20 mg Oral QPM     lisinopril  20 mg Oral Daily     polyethylene glycol  17 g Oral Daily     senna-docusate  2 tablet Oral At Bedtime        acetaminophen, diclofenac, hydrALAZINE, melatonin     PHYSICAL EXAM  /74 (BP Location: Left arm)   Pulse 83   Temp 98  F (36.7  C) (Oral)   Resp 20   Ht 1.473 m (4' 10\")   Wt 49 kg (108 lb 0.4 oz)   SpO2 98%   BMI 22.58 kg/m     Gen: NAD  Cardio: RRR  Pulm: non-labored on room air  Abd: soft, non-tender, non-distended  Ext: no appreciable edema in bilat lower extremities  Neuro/MSK: awake, alert, PERRL, left upper facial weakness, right lower facial droop, right hemiparesis.      LABS  CBC RESULTS:   Recent Labs   Lab Test 05/01/23  0836 04/24/23  0557 04/17/23  0945   WBC 9.2 10.1 8.1   RBC 4.72 4.79 5.12   HGB 12.3 12.4 12.9   HCT 39.8 40.2 42.6   MCV 84 84 83   MCH 26.1* 25.9* 25.2*   MCHC 30.9* 30.8* 30.3*   RDW 15.9* 16.2* 16.5*    355 388     Last Basic Metabolic Panel:  Recent Labs   Lab Test 05/01/23  0836 04/24/23  0557 04/17/23  0945    140 139   POTASSIUM 4.1 4.7 3.5   CHLORIDE 103 102 104   CO2 25 26 23   ANIONGAP 13 12 12   * 98 182*   BUN 13.1 12.0 12.4   CR 0.70 0.69 0.71 "   GFRESTIMATED >90 >90 >90   SALAZAR 10.4* 10.2* 10.0       Rehabilitation - continue comprehensive acute inpatient rehabilitation program with multidisciplinary approach including therapies, rehab nursing, and physiatry following. See interval history for updates.      ASSESSMENT AND PLAN  Mercy Osman is a 52 year old right hand dominant female with a past medical history of hypertension, [place]' palsy who was admitted on 4/8/23 with spontaneous acute left thalamic intracranial hemorrhage with hospital course complicated by hyperlipidemia, prediabetes, elevated troponin, anemia, hypokalemia, and loose stools.  She is now admitted to ARU on 4/14/23 for multidisciplinary rehabilitation and ongoing medical management.        Admission to acute inpatient rehab 4/14/23.    Impairment group code: Stroke Vascular Hemorrhagic 01.2 (R) Body Involvement (L) Brain -  left basal ganglia hemorrhage        1. PT, OT and SLP 60 minutes of each on a daily basis, in addition to rehab nursing and close management of physiatrist.       2. Impairment of ADL's: Noted to have impaired activity tolerance, impaired balance, impaired coordination, impaired ROM, impaired sensation, impaired strength w/ R hemiparesis, impaired motor control, fatigue, dizziness, and impaired weight shifting, all affecting her ability to safely and independently perform basic ADLs.  Goal for min A or less with basic ADLs.     3. Impairment of mobility:  Noted to have impaired activity tolerance, impaired balance, impaired coordination, impaired ROM, impaired sensation, impaired strength w/ R hemiparesis, impaired motor control, fatigue, dizziness, and impaired weight shifting, all affecting her ability to safely and independently perform basic mobility.  Goal for min A or less with basic mobility.     4. Impairment of cognition/language/swallow:  Noted to have dysarthria and dysphagia, and would benefit from full cognitive-linguistic evaluation at ARU with goals for  improved cognitive-linguistic skills to meet basic needs and safe tolerance of least restrictive diet.     5. Medical Conditions  New actions/orders/updates for today are in blue.     Left basal ganglia intraparenchymal hemorrhage, spontaneous, likely secondary to uncontrolled hypertension  Vasogenic cerebral edema  Deficits: right hemiparesis, right facial droop, right hemisensory deficit, right visual neglect, dysarthria, ?aphasia, dysphagia, suspect impaired cognition  - Long-term BP goal 140/90 or less, management as below  - Stroke PLC completed 4/10 (during inpatient stay)  - 4/18 noting some generalized right-sided pain but unable to further describe.  ?central.  Also with significant R shoulder subluxation. Continue PRN Tylenol and added PRN voltaren.  - Continue PT/OT/SLP  - Follow up with neurology 6-8 weeks after your discharge      Hypertension  PTA on amlodipine-benazepril 10-20 mg capsule.  BP elevated to 220s/100s at presentation. Initially managed on nicardipine drip, then transitioned to PO lisinopril + amlodipine.  - Long-term BP goal 140/90 or less  - Continue Lisinopril 20mg daily  - Continue amlodipine 10mg daily  - PRN hydralazine for SBP >160  - Monitor BP, adjust regimen as indicated     Hyperlipidemia  Started on statin therapy this admission with .  - Continue atorvastatin 20 mg daily     Troponin elevation, asymptomatic, spontaneously resolved  EKG without obvious acute ischemic changes.  Echo WNL.  No cardiogenic symptoms, suspect transient stress related troponin leak.  Peaked to 113 on 4/10.  Troponin improved spontaneously without intervention.  - Further work-up if any cardiogenic symptoms develop     Hypokalemia, resolved s/p replacement  Hyperchloremia, resolved with adequate hydration    - Trend BMP every Monday.  5/1: K WNL 4.1, Cl WNL at 103  - Replete as needed per protocol     Pre-diabetes   Hgb A1c 6.2% 4/8/2023   - Monitor with surveillance with primary care  "outpatient  - No routine BG checks indicated, monitor periodically with BMP     Anemia, chronic   Hgb 11.5 on presentation.  WNL at 13.4 as of .  - Hgb goal >7, plt goal >50k  - Trend CBC every Monday.  : Hgb stable at 12.3    Major Depressive Disorder, recurrent episode, moderate  SW intake with PHQ-9 score 18, did indicate some passive suicidal ideation.  Per family, have noted some symptoms of depression since patient's son  of suicide ~3 years ago.  - Psychiatry consulted, appreciate assistance.  Met with patient .  Discussed potential medication trial, but patient would like to \"think about it\".  If agreeable, psychiatry recommending Remeron 7.5 mg at HS.  - Revisited with patient .  She would prefer to hold off on medication trial, but is now open to psychology, consulted and attempted to see patient on  though scheduling problem, will now see this week.  - Therapy team noted patient more emotional .  As above, psych planning to see, can revisit option to start anti-depressant medication as well.       6. Adjustment to disability:  Clinical psychology to eval and treat if indicated  7. FEN: easy to chew (level 7) diet, thin liquids  8. Bowel: continent/incontinent, monitor, PRN and scheduled bowel meds available  9. Bladder: continent/incontinent  10. DVT Prophylaxis: mechanical  11. GI Prophylaxis: not indicated  12. Code: full  13. Disposition: home with family support  14. ELOS:  target 23  15. Follow up Appointments on Discharge: PCP in 1-2 weeks, neurology in 6-8 weeks after discharge      Patient was discussed with Dr. Erasmo Diaz, PM&R staff physician     Fabi Virgen PA-C  Physical Medicine & Rehabilitation      "

## 2023-05-02 NOTE — PLAN OF CARE
Discharge Planner Post-Acute Rehab PT:      Discharge Plan: home with  family assistance, home PT     Precautions: alarms, R hemiparesis,  Do not leave on commode/EOB alone,  Hmong       Current Status: *Dgtr Kayli okay to transfer/assist with toileting  Bed Mobility: Supervision  Transfer: CGA pivot B direction  Gait: 100' CGA SBQC, R foot up  Stairs: Min-A with SBQC     Ponce/56 on  on     39/56 on   PASS:    on     28/36 on      Assessment: Stairs progressing with quad cane. Ponce improved. Issued foot-up as ankle stability improving, no longer planning AFO. W/c rental ordered.    Other Barriers to Discharge (DME, Family Training, etc):   Foot-up - issued  Quad Cane  W/c - K3 rental - ordered  Stairs to enter - 2 w/o rail  Family Training

## 2023-05-02 NOTE — PLAN OF CARE
Goal Outcome Evaluation:           Overall Patient Progress: no changeOverall Patient Progress: no change       Patient here with Stroke with right side weakness, Hmong speaking, daughter at bedside  Slept most of the night  No complained of pain, headache, chest pain, N&V, no SOB  Continent of Bladder and Bowel, daughter cleared to transfer patient, reported BM this shift.   Safety rounding checked completed, Continue with POC.

## 2023-05-02 NOTE — PROGRESS NOTES
"Rehabilitation Medicine Wheelchair Face to Face     Diagnosis: L basal ganglia hemorrhage.   Currently has limited mobility due to hemiparesis, impaired sensation, coordination deficit.  Current transfer status: CGA SBQC.  Distance patient currently able to walk: 75' CGA/min-A with SBQC and foot-up. Difficulty with turns. Slow step-to pattern.  Therapy team has ruled out the following less costly options:   Cane due to requiring assist due to fall risk   Walker due to inability to  due to RUE hemiparesis  Patient will use wheelchair to complete Mobility Related ADL's in the home including toileting, dressing, self cares.  Without a wheelchair patient will be unable to safely move about their home.  This equipment will allow them to be out of bed, participate in home activities with their family, and it will be part of a fall prevention plan for safe mobility.   Patient's home will accommodate use of wheelchair.  Patient has a family member willing and able to assist as necessary with wheelchair.     Patient needs a mir-height chair due to small body stature in order to self propel with their feet.    Arm and leg strength: RUE grossly 1-2/5. RLE 2-3+    Gait/balance/coordination: Requires assist for gait and remains a fall risk during turns due to RLE buckling. PASS 28/36. Ponce 39/56, but increased difficulty with more dynamic activity.    Length of need: 6 (months, lifetime=99)    Current height:4'10\"  Current weight:108  :1970    Fabi Virgen PA-C, NPI: [id number]    Signature:  Date:    "

## 2023-05-03 ENCOUNTER — APPOINTMENT (OUTPATIENT)
Dept: PHYSICAL THERAPY | Facility: CLINIC | Age: 53
End: 2023-05-03
Attending: PHYSICAL MEDICINE & REHABILITATION
Payer: COMMERCIAL

## 2023-05-03 ENCOUNTER — APPOINTMENT (OUTPATIENT)
Dept: OCCUPATIONAL THERAPY | Facility: CLINIC | Age: 53
End: 2023-05-03
Attending: PHYSICAL MEDICINE & REHABILITATION
Payer: COMMERCIAL

## 2023-05-03 ENCOUNTER — APPOINTMENT (OUTPATIENT)
Dept: SPEECH THERAPY | Facility: CLINIC | Age: 53
End: 2023-05-03
Attending: PHYSICAL MEDICINE & REHABILITATION
Payer: COMMERCIAL

## 2023-05-03 PROCEDURE — 97535 SELF CARE MNGMENT TRAINING: CPT | Mod: GO | Performed by: OCCUPATIONAL THERAPIST

## 2023-05-03 PROCEDURE — 250N000013 HC RX MED GY IP 250 OP 250 PS 637: Performed by: PHYSICIAN ASSISTANT

## 2023-05-03 PROCEDURE — L3675 SO VEST CANVAS/WEB PRE OTS: HCPCS

## 2023-05-03 PROCEDURE — 99232 SBSQ HOSP IP/OBS MODERATE 35: CPT | Mod: FS | Performed by: PHYSICIAN ASSISTANT

## 2023-05-03 PROCEDURE — 97112 NEUROMUSCULAR REEDUCATION: CPT | Mod: GP

## 2023-05-03 PROCEDURE — 999N000125 HC STATISTIC PATIENT MED CONFERENCE < 30 MIN

## 2023-05-03 PROCEDURE — 97112 NEUROMUSCULAR REEDUCATION: CPT | Mod: GO | Performed by: OCCUPATIONAL THERAPIST

## 2023-05-03 PROCEDURE — 97530 THERAPEUTIC ACTIVITIES: CPT | Mod: GP

## 2023-05-03 PROCEDURE — 128N000003 HC R&B REHAB

## 2023-05-03 PROCEDURE — 999N000125 HC STATISTIC PATIENT MED CONFERENCE < 30 MIN: Performed by: OCCUPATIONAL THERAPIST

## 2023-05-03 PROCEDURE — 92507 TX SP LANG VOICE COMM INDIV: CPT | Mod: GN | Performed by: SPEECH-LANGUAGE PATHOLOGIST

## 2023-05-03 PROCEDURE — 999N000150 HC STATISTIC PT MED CONFERENCE < 30 MIN

## 2023-05-03 RX ADMIN — ACETAMINOPHEN 325MG 650 MG: 325 TABLET ORAL at 16:19

## 2023-05-03 RX ADMIN — SENNOSIDES AND DOCUSATE SODIUM 2 TABLET: 50; 8.6 TABLET ORAL at 20:11

## 2023-05-03 RX ADMIN — ATORVASTATIN CALCIUM 20 MG: 10 TABLET, FILM COATED ORAL at 20:11

## 2023-05-03 ASSESSMENT — ACTIVITIES OF DAILY LIVING (ADL)
ADLS_ACUITY_SCORE: 41

## 2023-05-03 NOTE — PROGRESS NOTES
"  [facility]   Acute Rehabilitation Unit  Daily progress note    INTERVAL HISTORY  Mercy Osman was seen and examined this morning during team rounds with daughter present,  assisting.  No acute events reported overnight.  Patient reports to be feeling well overall and has made good progress.  She is looking forward to getting home, but expresses appreciation for rehab team and will \"miss everyone\".  Denies other concerns or questions at this time.    Functionally, she continues to make steady progress.  Family training has been ongoing with her daughter, who is at the bedside day and night and prepared to provide needed supports at home.  Working on obtaining necessary equipment for home including wheelchair.  Gait is progressing and noting some distal return, so now progressed to foot-up rather than AFO.  Ponce has improved significantly from admission.  On track for discharge home on Friday with family assist and outpatient therapies.      MEDICATIONS    amLODIPine  10 mg Oral Daily     atorvastatin  20 mg Oral QPM     lisinopril  20 mg Oral Daily     polyethylene glycol  17 g Oral Daily     senna-docusate  2 tablet Oral At Bedtime        acetaminophen, diclofenac, hydrALAZINE, melatonin     PHYSICAL EXAM  /66 (BP Location: Left arm)   Pulse 79   Temp 97  F (36.1  C) (Oral)   Resp 20   Ht 1.473 m (4' 10\")   Wt 49 kg (108 lb 0.4 oz)   SpO2 99%   BMI 22.58 kg/m     Gen: NAD  Cardio: appears well-perfused  Pulm: non-labored on room air  Abd: soft, non-tender, non-distended  Ext: no appreciable edema in bilat lower extremities  Neuro/MSK: awake, alert, PERRL, left upper facial weakness, right lower facial droop, right hemiparesis.  *Full exam deferred today for conversation    LABS  CBC RESULTS:   Recent Labs   Lab Test 05/01/23  0836 04/24/23  0557 04/17/23  0945   WBC 9.2 10.1 8.1   RBC 4.72 4.79 5.12   HGB 12.3 12.4 12.9   HCT 39.8 40.2 42.6   MCV 84 84 83 "   MCH 26.1* 25.9* 25.2*   MCHC 30.9* 30.8* 30.3*   RDW 15.9* 16.2* 16.5*    355 388     Last Basic Metabolic Panel:  Recent Labs   Lab Test 05/01/23  0836 04/24/23  0557 04/17/23  0945    140 139   POTASSIUM 4.1 4.7 3.5   CHLORIDE 103 102 104   CO2 25 26 23   ANIONGAP 13 12 12   * 98 182*   BUN 13.1 12.0 12.4   CR 0.70 0.69 0.71   GFRESTIMATED >90 >90 >90   SALAZAR 10.4* 10.2* 10.0       Rehabilitation - continue comprehensive acute inpatient rehabilitation program with multidisciplinary approach including therapies, rehab nursing, and physiatry following. See interval history for updates.      ASSESSMENT AND PLAN  Mercy Osman is a 52 year old right hand dominant female with a past medical history of hypertension, Newcastle' palsy who was admitted on 4/8/23 with spontaneous acute left thalamic intracranial hemorrhage with hospital course complicated by hyperlipidemia, prediabetes, elevated troponin, anemia, hypokalemia, and loose stools.  She is now admitted to ARU on 4/14/23 for multidisciplinary rehabilitation and ongoing medical management.        Admission to acute inpatient rehab 4/14/23.    Impairment group code: Stroke Vascular Hemorrhagic 01.2 (R) Body Involvement (L) Brain -  left basal ganglia hemorrhage        1. PT, OT and SLP 60 minutes of each on a daily basis, in addition to rehab nursing and close management of physiatrist.       2. Impairment of ADL's: Noted to have impaired activity tolerance, impaired balance, impaired coordination, impaired ROM, impaired sensation, impaired strength w/ R hemiparesis, impaired motor control, fatigue, dizziness, and impaired weight shifting, all affecting her ability to safely and independently perform basic ADLs.  Goal for min A or less with basic ADLs.     3. Impairment of mobility:  Noted to have impaired activity tolerance, impaired balance, impaired coordination, impaired ROM, impaired sensation, impaired strength w/ R hemiparesis, impaired motor  control, fatigue, dizziness, and impaired weight shifting, all affecting her ability to safely and independently perform basic mobility.  Goal for min A or less with basic mobility.     4. Impairment of cognition/language/swallow:  Noted to have dysarthria and dysphagia, and would benefit from full cognitive-linguistic evaluation at ARU with goals for improved cognitive-linguistic skills to meet basic needs and safe tolerance of least restrictive diet.     5. Medical Conditions  New actions/orders/updates for today are in blue.     Left basal ganglia intraparenchymal hemorrhage, spontaneous, likely secondary to uncontrolled hypertension  Vasogenic cerebral edema  Deficits: right hemiparesis, right facial droop, right hemisensory deficit, right visual neglect, dysarthria, ?aphasia, dysphagia, suspect impaired cognition  - Long-term BP goal 140/90 or less, management as below  - Stroke PLC completed 4/10 (during inpatient stay)  - 4/18 noting some generalized right-sided pain but unable to further describe.  ?central.  Also with significant R shoulder subluxation. Continue PRN Tylenol and added PRN voltaren.  - Continue PT/OT/SLP  - Follow up with neurology 6-8 weeks after your discharge      Hypertension  PTA on amlodipine-benazepril 10-20 mg capsule.  BP elevated to 220s/100s at presentation. Initially managed on nicardipine drip, then transitioned to PO lisinopril + amlodipine.  - Long-term BP goal 140/90 or less  - Continue Lisinopril 20mg daily  - Continue amlodipine 10mg daily  - PRN hydralazine for SBP >160  - BP on lower side today, though overall trend has been good, continue to monitor  - Monitor BP, adjust regimen as indicated     Hyperlipidemia  Started on statin therapy this admission with .  - Continue atorvastatin 20 mg daily     Troponin elevation, asymptomatic, spontaneously resolved  EKG without obvious acute ischemic changes.  Echo WNL.  No cardiogenic symptoms, suspect transient stress  "related troponin leak.  Peaked to 113 on 4/10.  Troponin improved spontaneously without intervention.  - Further work-up if any cardiogenic symptoms develop     Hypokalemia, resolved s/p replacement  Hyperchloremia, resolved with adequate hydration    - Trend BMP every Monday.  : K WNL 4.1, Cl WNL at 103  - Replete as needed per protocol     Pre-diabetes   Hgb A1c 6.2% 2023   - Monitor with surveillance with primary care outpatient  - No routine BG checks indicated, monitor periodically with BMP     Anemia, chronic   Hgb 11.5 on presentation.  WNL at 13.4 as of .  - Hgb goal >7, plt goal >50k  - Trend CBC every Monday.  : Hgb stable at 12.3    Major Depressive Disorder, recurrent episode, moderate  SW intake with PHQ-9 score 18, did indicate some passive suicidal ideation.  Per family, have noted some symptoms of depression since patient's son  of suicide ~3 years ago.  - Psychiatry consulted, appreciate assistance.  Met with patient .  Discussed potential medication trial, but patient would like to \"think about it\".  If agreeable, psychiatry recommending Remeron 7.5 mg at HS.  - Revisited with patient .  She would prefer to hold off on medication trial, but is now open to psychology, consulted and attempted to see patient on  though scheduling problem, will now see this week.  - Therapy team noted patient more emotional .  As above, psych planning to see, can revisit option to start anti-depressant medication as well.       6. Adjustment to disability:  Clinical psychology to eval and treat if indicated  7. FEN: easy to chew (level 7) diet, thin liquids  8. Bowel: continent/incontinent, monitor, PRN and scheduled bowel meds available  9. Bladder: continent/incontinent  10. DVT Prophylaxis: mechanical  11. GI Prophylaxis: not indicated  12. Code: full  13. Disposition: home with family support  14. ELOS:  23  15. Follow up Appointments on Discharge: PCP in 1-2 weeks, neurology " in 6-8 weeks after discharge      Patient was seen and discussed with Dr. Erasmo Diaz, PM&R staff physician     CARIN Louise-C  Physical Medicine & Rehabilitation

## 2023-05-03 NOTE — PLAN OF CARE
Discharge Planner Post-Acute Rehab PT:      Discharge Plan: home with  family assistance, OP PT     Precautions: alarms, R hemiparesis,  Do not leave on commode/EOB alone,  Hmong       Current Status: *Dgtr Kayli okay to transfer/assist with toileting  Bed Mobility: Supervision  Transfer: CGA pivot B direction  Gait: 100' CGA SBQC, R foot up  Stairs: Min-A with SBQC     Ponce/56 on  on  on   PASS:    on  on      Assessment: IND day tomorrow. Will issue SBQC. W/c was ordered at the start of the week, hopefully delivered tomorrow.    Other Barriers to Discharge (DME, Family Training, etc):   Foot-up - issued  Quad Cane - issue here  W/c - K3 rental - ordered    ----------------------------------    Skilled set up on :  Pt dependent for proper placement of electrodes on R gastroc, quad, hamstring, and anterior tib for optimum muscle contraction, safe positioning on w/c within frame and cushion for prevention of skin breakdown, feet secured to foot pedals, w/c secured to  w/ Q-straints.  Passive motion assessed to ensure proper positioning.       Pt performed 34 minutes of active FES ergometry with 100% stimulation applied to above muscles at 35 rpm with 1.99 Nm resistance.  This PT adjusted e-stim and cycling parameters in real-time to ensure palpable muscle contractions throughout session.  Please see [email or web address] for further details on patient's stimulation parameters and ergometry outcomes.       Changes in parameters that were part of this treatment:   -None - pt declined to increase resistance and fluctuated with fatigue     Functional outcomes from this intervention include:   -reduced spasticity  -improved sensory awareness and proprioception  -improved muscle strength  -improved motor coordination     Session Summary:   -Average Power: 3.5W  -Asymmetry: L 3%  -Active Minutes: 31

## 2023-05-03 NOTE — PLAN OF CARE
"Goal Outcome Evaluation:         VS: /66 (BP Location: Left arm)   Pulse 79   Temp 97  F (36.1  C) (Oral)   Resp 20   Ht 1.473 m (4' 10\")   Wt 49 kg (108 lb 0.4 oz)   SpO2 99%   BMI 22.58 kg/m     O2: RA   Output: Voids spontaneously without difficulty   Last BM: 4/30   Activity: Daughter in room helping with meals and transfers   Pain: Denies   CMS: Intact, AOx4. Denies numbness and tingling.   Dressing:    Diet: Regular diet.    Equipment: IV pole, personal belongings,    Plan: Continue POC   Additional Info:                    "

## 2023-05-03 NOTE — PROGRESS NOTES
"Discharge Planner Post-Acute Rehab OT:      Discharge Plan: Addendum: Home w/ OP OT d/t insurance barrier for HC. Home w/ family and 24/7 A      Precautions: Fall, R mir, *Dgtr Kayli okay to transfer/assist with toileting  Hmong speaking patient     Bedside Nursing to perform the following precautions for RUE shoulder subluxation:       No twisting, pulling, or lifting UE above 90 degrees of flexion or \"shoulder height\".    Support UE at all times to prevent further subluxation.    Position UE on arm tray when seated in wheelchair and on pillows when seated or lying in bed. Position UE in a neutral position.    Remove resting hand splint in morning; refer to splint orders for details    Avoid using hemiparetic UE for BP readings and IV/PICC placement when medically appropriate.     Current Status:  ADLs:    Mobility: CGA with functional mobility with quad cane in BR.      Grooming: CGA standing     Dressing: UB: SBA, CGA with LBD tasks with quad cane. Min A with footwear    Bathing: CGA/min A with W/C<>shower chair stand pivot transfer. Pt bathed 5/10 body parts seated.     Toileting: CGA with toilet transfer with quad cane- daughter okay to assist pt ambulating to/from bathroom.   IADLs: Supportive family  Vision/Cognition: Vision appears functional, screen as appropriate. Pt is disoriented, impaired memory, impaired attention, impaired speech/communication, aphasia possibly and variably impulsive- responds well to safety cues.      Assessment: Family training completed with pt and daughter on ambulation to/from bathroom with quad cane and toileting with quad cane. Daughter okay to assist pt with ambulation in BR with quad cane. Utilized Xcite for RUE neuro re-education and sensorimotor skills; refer to care plan note for further details.     Other Barriers to Discharge (DME, Family Training, etc): Has walk in shower on main floor.   DME: shower chair through  home medical-can be covered through [place], NMES " (provided daughter purchase information and MD script)  Family training: Daughter trained on FB dressing and toileting. Will need to train on shower transfers with shower chair

## 2023-05-03 NOTE — PLAN OF CARE
Goal Outcome Evaluation:      Plan of Care Reviewed With: patient    Overall Patient Progress: no changeOverall Patient Progress: no change    Pt A&Ox4. Daughter translates. A1 Sp, daughter Ok to transfer. Pt c/o L tooth pain, Prn tylenol given, effective. Pt denies SOB, headache, nausea or new numbness/tingling. No new skin issues. Call light in reach, calls appropriately, daughter at bedside.

## 2023-05-03 NOTE — PROGRESS NOTES
XCite stim unit for Activity Based Therapy. Skilled set up; determined appropriate FES parameters for each muscle group based off of strong tetanic response of muscle test.  Used the following activities from the software library: Hand  Intervention and patient response: pt completed 50 repetitions x1 sets with lumbrical grasp with writer guiding through transitional movements. Pt completed 50 repetitions x1 set with grasp/release with writer guiding through transitional movements.

## 2023-05-03 NOTE — PLAN OF CARE
Discharge Planner Post-Acute Rehab SLP:      Discharge Plan: Home with 24/7 supervision from family, ongoing speech therapy      Precautions: Disoriented, Mod-severe cognitive impairment, Hmong speaking (family interprets), falls risk, R hemiparesis     Current Status:  Hearing: Mild hearing loss per family report  Vision: WFL  Communication: Mild to moderate word finding difficulties  Cognition: Moderate-Severe Cognitive impairment  Swallow: Easy to Chew (7) and Thin Liquids (0). Patient and family indicate that patient prefers softer foods. Will monitor if patient presents with any future difficulties- denied difficulties 04/23     Assessment:  Instructed pt in picture description with everyday objects and situations, pt with occasional semantic paraphasia but no anomic events this session. Pt provided adequate description of picture scene stim in 100% opportunties.    Other Barriers to Discharge (Family Training, etc): family training

## 2023-05-03 NOTE — PLAN OF CARE
Acute Rehab Care Conference/Team Rounds    Type: Team Rounds    Present: Dr. Erasmo Bradley, Fabi Virgen PA, Dr. Shayy Presley Neuropsychologist, Manolo David PT, Chaya Metzger OT, Juanito Lugo SLP, Sylvia Turner LICSW, Carrie Aceves RD, Nayeli Busch RN, and Mercy Osman and dtr bedside.     Discharge Barriers/Treatment/Education    Rehab Diagnosis: Stroke Vascular Hemorrhagic 01.2 (R) Body Involvement (L) Brain -  left basal ganglia hemorrhage    Active Medical Co-morbidities/Prognosis:   Patient Active Problem List   Diagnosis     ICH (intracerebral hemorrhage) (H)        Safety: AxO; A1 SPT for transfers, W/C based, daughter at bedside, ok to transfer patient    Pain: no reported pain    Medications, Skin, Tubes/Lines: Takes medications whole; no skin issues; no lines, no tubes    Swallowing/Nutrition: On altered soft diet per patient/family request. Will advance diet as needed.     Bowel/Bladder: Continent of bowel and bladder; daughter cleared to transfer patient; LB 2023    Psychosocial: Pt. lives in house with  (Rush Roger) and adult children (1 daughter, 2 son).  1 stair to enter, 12 stairs within to basement but all needs can be met on main level. Pt was independent with all ADLs/IADLs, transfers, mobility and gait. Pt was dirving. Pt denies history of mental health. Pt scored 18 on PHQ-9 and reported having thoughts of harming herself. Kayli (Daughter) shared that Pt's son  by suicide and that it's been really hard on Pt. Pt. Denies alcohol\tobacco/drug use. Pt has adult children (1 daughter - Kayli Roger, 2 son - Mamta Roger and Joel Roger ). Daughter is able to provide 24/7 support on discharge. She is interested in becoming the patient's PCA.    ADLs/IADLs: Pt is making steady progress with ADLs. Pt requires CGA with G/H tasks standing at EOS. Pt requires SBA with donning/doffing shirt seated with cues for one handed dressing techniques. Pt requires CGA with LBD tasks  with quad cane. Pt requires min A with footwear d/t foot up and laces. Pt requires CGA with toilet transfer and toilet hygiene with quad cane. Pt requires CGA with shower transfers with use of shower chair. Will provide pt with shower chair for bathing at home through  home medical through [place]. Family training completed with daughter to safely assist with FB dressing, toileting, bathing, and G/h tasks. Utilizing FES and Xcite for RUE neuro re-education and sensorimotor skills. Daughter to order NMES unit for home for RUE HEP. OP OT services upon discharge.     Mobility: Pt has made great progress in PT. Progressing to CGA for short gait with SBQC with R foot-up. CGA/min-A with stairs with cane to enter home. Able to ambulate ~75' until fatigue leads to excessive knee hyperext. Daughter planning to assist at discharge. Will issue cane for gait, foot-up issued. W/c ordered to mixed household mobility. OP PT follow-up. Discharge later this week.  Ponce/56 on 20/ on                           39/56 on   PASS:               on                           28/36 on        Cognition/Language: Communication: Mild to moderate word finding difficulties. Able to communicate her wants and needs but difficulties in language further exacerbate success with cognitive tasks. Showing ongoing therapy Requiring full assistance with IADL tasks, which family is able to complete. Ongoing SLP interventions upon discharge.     Community Re-Entry: w/c based due to current mobility status    Transportation: Family to provide    Decision maker: self and family    Plan of Care and goals reviewed and updated.    Discharge Plan/Recommendations    Fall Precautions: continue    Patient/Family input to goals: Yes    Anticipated rehab needs following discharge: Home with family    Anticipated care giver support after discharge: Family    Estimated length of stay: 23    Overall  plan for the patient: Continue IP Rehabilitation.       Utilization Review and Continued Stay Justification    Medical Necessity Criteria:    For any criteria that is not met, please document reason and plan for discharge, transfer, or modification of plan of care to address.    Requires intensive rehabilitation program to treat functional deficits?: Yes    Requires 3x per week or greater involvement of rehabilitation physician to oversee rehabilitation program?: Yes    Requires rehabilitation nursing interventions?: Yes    Patient is making functional progress?: Yes    There is a potential for additional functional progress? Yes    Patient is participating in therapy 3 hours per day a minimum of 5 days per week or 15 hours per week in 7 day period?:Yes    Has discharge needs that require coordinated discharge planning approach?:Yes          Final Physician Sign off    Statement of Approval: I approve the plan of care.     Patient Goals  Social Work Goals: Home Friday 05/05/23 with OP therapy and 24/7 support from family.     OT Predicted Duration/Target Date for Goal Attainment: 05/05/23  Therapy Frequency (OT): Daily  OT: Hygiene/Grooming: modified independent  OT: Upper Body Dressing: Minimal assist: GOAL MET  OT: Lower Body Dressing: Minimal assist: GOAL MET  OT: Upper Body Bathing: Supervision/stand-by assist  OT: Lower Body Bathing: Minimal assist  OT: Bed Mobility: Supervision/stand-by assist: GOAL MET  OT: Transfer: Minimal assist: GOAL MET  OT: Toilet Transfer/Toileting: Minimal assist: GOAL MET     PT Predicted Duration/Target Date for Goal Attainment: 04/28/23  PT Frequency: Daily  PT: Bed Mobility: Supervision/stand-by assist  PT: Transfers: Supervision/stand-by assist (with least restrictive assistive device)  PT: Gait: Minimal assist (25 feet with least restrictive assistive device)  PT: Stairs: Minimal assist (4-5 with family support to be able to get into the house)  PT: Wheelchair Mobility: 150 feet  (with supervision in manual wheelchair)                                   SLP Predicted Duration/Target Date for Goal Attainment: 05/05/23  Therapy Frequency (SLP Eval): daily  SLP: Goal 1: Patient will utilize compensatory memory strategies to recall novel information and answer orientation questions with 80% accuracy given minimal cues.  SLP: Goal 2: Patient will engage in basic-moderate level attention and problem solving tasks with a level of 80% accuracy given minimal verbal cues.           Goal: Medical Management: Pt and family will demonstrate adequate knowledge of medications prior to discharge from ARU                       Goal: Skin Integrity: Pt will remain free of skin breakdown throughout stay at ARU by repositioning properly and advocating for incont cares                    Goal: Safety Management: Pt will remain free of falls by calling appropriately and waiting for staff assistance throughout stay at ARU

## 2023-05-03 NOTE — PLAN OF CARE
"VS: /75   Pulse 85  Temp 98 (Oral)   Resp 18   Ht 1.473 m (4' 10\")   Wt 49 kg (108 lb 0.4 oz)   SpO2 98%   BMI 22.58 kg/m     O2: RA   Output: Voids spontaneously without difficulty. Continent of bowel and bladder.    Last BM: 5/2   Activity: Daughter in room helping with meals and transfers. Stand pivot transfer with gait belt and assist of one.    Pain: Denies   CMS: Intact, AOx4. Denies numbness and tingling. Right sided weakness.   Dressing: None    Diet: Regular diet.    Equipment: IV pole, personal belongings,    Plan: Continue POC   Additional Info:  Pleasant and cooperative. Family helpful with her care. BP medications held per parameters.                    Goal Outcome Evaluation: ongoing       Plan of Care Reviewed With: patient                 "

## 2023-05-04 ENCOUNTER — APPOINTMENT (OUTPATIENT)
Dept: OCCUPATIONAL THERAPY | Facility: CLINIC | Age: 53
End: 2023-05-04
Attending: PHYSICAL MEDICINE & REHABILITATION
Payer: COMMERCIAL

## 2023-05-04 ENCOUNTER — APPOINTMENT (OUTPATIENT)
Dept: PHYSICAL THERAPY | Facility: CLINIC | Age: 53
End: 2023-05-04
Attending: PHYSICAL MEDICINE & REHABILITATION
Payer: COMMERCIAL

## 2023-05-04 ENCOUNTER — APPOINTMENT (OUTPATIENT)
Dept: SPEECH THERAPY | Facility: CLINIC | Age: 53
End: 2023-05-04
Attending: PHYSICAL MEDICINE & REHABILITATION
Payer: COMMERCIAL

## 2023-05-04 PROCEDURE — 92507 TX SP LANG VOICE COMM INDIV: CPT | Mod: GN

## 2023-05-04 PROCEDURE — 97535 SELF CARE MNGMENT TRAINING: CPT | Mod: GO | Performed by: OCCUPATIONAL THERAPIST

## 2023-05-04 PROCEDURE — 250N000013 HC RX MED GY IP 250 OP 250 PS 637: Performed by: PHYSICIAN ASSISTANT

## 2023-05-04 PROCEDURE — 97530 THERAPEUTIC ACTIVITIES: CPT | Mod: GP

## 2023-05-04 PROCEDURE — 97116 GAIT TRAINING THERAPY: CPT | Mod: GP

## 2023-05-04 PROCEDURE — 128N000003 HC R&B REHAB

## 2023-05-04 PROCEDURE — 99232 SBSQ HOSP IP/OBS MODERATE 35: CPT | Mod: FS | Performed by: PHYSICIAN ASSISTANT

## 2023-05-04 RX ORDER — LISINOPRIL 20 MG/1
20 TABLET ORAL DAILY
Qty: 30 TABLET | Refills: 0 | Status: SHIPPED | OUTPATIENT
Start: 2023-05-04

## 2023-05-04 RX ORDER — AMOXICILLIN 250 MG
1-2 CAPSULE ORAL
Qty: 30 TABLET | Refills: 0 | Status: SHIPPED | OUTPATIENT
Start: 2023-05-04

## 2023-05-04 RX ORDER — AMLODIPINE BESYLATE 5 MG/1
5 TABLET ORAL DAILY
Status: DISCONTINUED | OUTPATIENT
Start: 2023-05-04 | End: 2023-05-05 | Stop reason: HOSPADM

## 2023-05-04 RX ORDER — AMLODIPINE BESYLATE 5 MG/1
5 TABLET ORAL DAILY
Qty: 30 TABLET | Refills: 0 | Status: SHIPPED | OUTPATIENT
Start: 2023-05-04

## 2023-05-04 RX ORDER — ATORVASTATIN CALCIUM 20 MG/1
20 TABLET, FILM COATED ORAL EVERY EVENING
Qty: 30 TABLET | Refills: 0 | Status: SHIPPED | OUTPATIENT
Start: 2023-05-04

## 2023-05-04 RX ORDER — POLYETHYLENE GLYCOL 3350 17 G/17G
17 POWDER, FOR SOLUTION ORAL DAILY PRN
Status: DISCONTINUED | OUTPATIENT
Start: 2023-05-04 | End: 2023-05-05 | Stop reason: HOSPADM

## 2023-05-04 RX ORDER — ACETAMINOPHEN 325 MG/1
650 TABLET ORAL EVERY 4 HOURS PRN
COMMUNITY
Start: 2023-05-04

## 2023-05-04 RX ADMIN — ATORVASTATIN CALCIUM 20 MG: 10 TABLET, FILM COATED ORAL at 19:50

## 2023-05-04 RX ADMIN — LISINOPRIL 20 MG: 20 TABLET ORAL at 09:02

## 2023-05-04 RX ADMIN — AMLODIPINE BESYLATE 5 MG: 5 TABLET ORAL at 09:02

## 2023-05-04 ASSESSMENT — ACTIVITIES OF DAILY LIVING (ADL)
ADLS_ACUITY_SCORE: 41
ADLS_ACUITY_SCORE: 41
ADLS_ACUITY_SCORE: 37
ADLS_ACUITY_SCORE: 41
ADLS_ACUITY_SCORE: 37
ADLS_ACUITY_SCORE: 37
ADLS_ACUITY_SCORE: 41

## 2023-05-04 NOTE — PLAN OF CARE
"Goal Outcome Evaluation: Ongoing       Plan of Care Reviewed With: patient, caregiver       VS: /76  Pulse 94 Temp 99.2(Oral)   Resp 18   Ht 1.473 m (4' 10\")   Wt 49 kg (108 lb 0.4 oz)   SpO2 98%   BMI 22.58 kg/m     O2: RA   Output: Voids spontaneously without difficulty. Continent of bowel and bladder.    Last BM: 5/4   Activity: Daughter in room helping with meals and transfers. Stand pivot transfer with gait belt and assist of one.    Pain: Denies   CMS: Intact, AOx4. Denies numbness and tingling. Right sided weakness.   Dressing: None    Diet: Regular diet.    Equipment: IV pole, personal belongings,    Plan: Continue POC   Additional Info:  Pleasant and cooperative. Family helpful with her care.      13:00 T 98.2 orally on recheck.                    Goal Outcome Evaluation: ongoing        Plan of Care Reviewed With: patient and daughter.           "

## 2023-05-04 NOTE — PLAN OF CARE
"Goal Outcome Evaluation:      Plan of Care Reviewed With: patient  VS: /65 (BP Location: Left arm)   Pulse 101   Temp 97  F (36.1  C) (Oral)   Resp 16   Ht 1.473 m (4' 10\")   Wt 49 kg (108 lb 0.4 oz)   SpO2 98%   BMI 22.58 kg/m       O2: SpO2 >90%  and stable on RA. Denies chest pain and SOB.    Output: Continent of bowels and bladder   Last BM: 5/3, denies abdominal discomfort.   Activity: Up with Assist x 1 stand pivot   Skin: No skin issues reported during shift change   Pain:  Denies Pain this shift (NOC)   CMS: Intact, AOx4. Denies numbness and tingling. Pt observed sleeping on bed. Daughter in room. No acute change overnight.   Dressing: NA   Diet: Regular diet; easy to chew level 7  Thin liquids   LDA: No PIV access. Orthotic brace on Lower right arm and right ankle   Equipment: IV pole, personal belongings,    Plan: Continue with plan of care. Call light within reach, pt able to make needs known.    Additional Info: Daughter is ok to transfer patient in room.                    "

## 2023-05-04 NOTE — PROGRESS NOTES
"  [facility]   Acute Rehabilitation Unit  Daily progress note    INTERVAL HISTORY  Mercy Osman was seen and examined this morning with daughter present,  assisting by phone.  No acute events reported overnight.  Patient reports that she is feeling well overall.  States that she \"does not want to leave\" but \"has been here for a while\" so she will.  Reviewed expect ongoing stroke recovery, will continue to work with outpatient therapies.  She denies any dizziness or lightheadedness despite lower BP.  Reviewed plans for med change today and also reviewed meds for discharge.  Patient and daughter deny any other concerns or questions at this time.    Functionally, she is currently needing supervision for bed mobility, CGA for pivot transfers, CGA for ambulating 100' SBQC and R foot up.  On track for discharge home tomorrow.      MEDICATIONS    amLODIPine  10 mg Oral Daily     atorvastatin  20 mg Oral QPM     lisinopril  20 mg Oral Daily     polyethylene glycol  17 g Oral Daily     senna-docusate  2 tablet Oral At Bedtime        acetaminophen, diclofenac, hydrALAZINE, melatonin     PHYSICAL EXAM  /65 (BP Location: Left arm)   Pulse 101   Temp 97  F (36.1  C) (Oral)   Resp 16   Ht 1.473 m (4' 10\")   Wt 49 kg (108 lb 0.4 oz)   SpO2 98%   BMI 22.58 kg/m     Gen: NAD  Cardio: appears well-perfused  Pulm: non-labored on room air  Abd: soft, non-tender, non-distended  Ext: no appreciable edema in bilat lower extremities  Neuro/MSK: awake, alert, PERRL, left upper facial weakness, right lower facial droop, right hemiparesis.    LABS  CBC RESULTS:   Recent Labs   Lab Test 05/01/23  0836 04/24/23  0557 04/17/23  0945   WBC 9.2 10.1 8.1   RBC 4.72 4.79 5.12   HGB 12.3 12.4 12.9   HCT 39.8 40.2 42.6   MCV 84 84 83   MCH 26.1* 25.9* 25.2*   MCHC 30.9* 30.8* 30.3*   RDW 15.9* 16.2* 16.5*    355 388     Last Basic Metabolic Panel:  Recent Labs   Lab Test 05/01/23  0836 " 04/24/23  0557 04/17/23  0945    140 139   POTASSIUM 4.1 4.7 3.5   CHLORIDE 103 102 104   CO2 25 26 23   ANIONGAP 13 12 12   * 98 182*   BUN 13.1 12.0 12.4   CR 0.70 0.69 0.71   GFRESTIMATED >90 >90 >90   SALAZAR 10.4* 10.2* 10.0       Rehabilitation - continue comprehensive acute inpatient rehabilitation program with multidisciplinary approach including therapies, rehab nursing, and physiatry following. See interval history for updates.      ASSESSMENT AND PLAN  Mercy Osman is a 52 year old right hand dominant female with a past medical history of hypertension, [place]' palsy who was admitted on 4/8/23 with spontaneous acute left thalamic intracranial hemorrhage with hospital course complicated by hyperlipidemia, prediabetes, elevated troponin, anemia, hypokalemia, and loose stools.  She is now admitted to ARU on 4/14/23 for multidisciplinary rehabilitation and ongoing medical management.        Admission to acute inpatient rehab 4/14/23.    Impairment group code: Stroke Vascular Hemorrhagic 01.2 (R) Body Involvement (L) Brain -  left basal ganglia hemorrhage        1. PT, OT and SLP 60 minutes of each on a daily basis, in addition to rehab nursing and close management of physiatrist.       2. Impairment of ADL's: Noted to have impaired activity tolerance, impaired balance, impaired coordination, impaired ROM, impaired sensation, impaired strength w/ R hemiparesis, impaired motor control, fatigue, dizziness, and impaired weight shifting, all affecting her ability to safely and independently perform basic ADLs.  Goal for min A or less with basic ADLs.     3. Impairment of mobility:  Noted to have impaired activity tolerance, impaired balance, impaired coordination, impaired ROM, impaired sensation, impaired strength w/ R hemiparesis, impaired motor control, fatigue, dizziness, and impaired weight shifting, all affecting her ability to safely and independently perform basic mobility.  Goal for min A or less  with basic mobility.     4. Impairment of cognition/language/swallow:  Noted to have dysarthria and dysphagia, and would benefit from full cognitive-linguistic evaluation at ARU with goals for improved cognitive-linguistic skills to meet basic needs and safe tolerance of least restrictive diet.     5. Medical Conditions  New actions/orders/updates for today are in blue.     Left basal ganglia intraparenchymal hemorrhage, spontaneous, likely secondary to uncontrolled hypertension  Vasogenic cerebral edema  Deficits: right hemiparesis, right facial droop, right hemisensory deficit, right visual neglect, dysarthria, ?aphasia, dysphagia, suspect impaired cognition  - Long-term BP goal 140/90 or less, management as below  - Stroke PLC completed 4/10 (during inpatient stay)  - 4/18 noting some generalized right-sided pain but unable to further describe.  ?central.  Also with significant R shoulder subluxation. Continue PRN Tylenol and added PRN voltaren.  - Continue PT/OT/SLP  - Follow up with neurology 6-8 weeks after your discharge      Hypertension  PTA on amlodipine-benazepril 10-20 mg capsule.  BP elevated to 220s/100s at presentation. Initially managed on nicardipine drip, then transitioned to PO lisinopril + amlodipine.  - Long-term BP goal 140/90 or less  - BP has been trending lower lately, -120 even without meds yesterday (due to hold parameters).  Decrease amlodipine to 5 mg daily.  - Continue Lisinopril 20mg daily  - PRN hydralazine for SBP >160  - Monitor BP, adjust regimen as indicated     Hyperlipidemia  Started on statin therapy this admission with .  - Continue atorvastatin 20 mg daily     Troponin elevation, asymptomatic, spontaneously resolved  EKG without obvious acute ischemic changes.  Echo WNL.  No cardiogenic symptoms, suspect transient stress related troponin leak.  Peaked to 113 on 4/10.  Troponin improved spontaneously without intervention.  - Further work-up if any cardiogenic  "symptoms develop     Hypokalemia, resolved s/p replacement  Hyperchloremia, resolved with adequate hydration    - Trend BMP every Monday.  : K WNL 4.1, Cl WNL at 103  - Replete as needed per protocol     Pre-diabetes   Hgb A1c 6.2% 2023   - Monitor with surveillance with primary care outpatient  - No routine BG checks indicated, monitor periodically with BMP     Anemia, chronic   Hgb 11.5 on presentation.  WNL at 13.4 as of .  - Hgb goal >7, plt goal >50k  - Trend CBC every Monday.  : Hgb stable at 12.3    Major Depressive Disorder, recurrent episode, moderate  SW intake with PHQ-9 score 18, did indicate some passive suicidal ideation.  Per family, have noted some symptoms of depression since patient's son  of suicide ~3 years ago.  - Psychiatry consulted, appreciate assistance.  Met with patient .  Discussed potential medication trial, but patient would like to \"think about it\".  If agreeable, psychiatry recommending Remeron 7.5 mg at HS.  - Revisited with patient .  She would prefer to hold off on medication trial, but is now open to psychology, consulted and attempted to see patient on  though scheduling problem, will now see this week.  - Therapy team noted patient more emotional .  As above, psych planning to see, can revisit option to start anti-depressant medication as well.       6. Adjustment to disability:  Clinical psychology to eval and treat if indicated  7. FEN: easy to chew (level 7) diet, thin liquids  8. Bowel: continent/incontinent, monitor, PRN and scheduled bowel meds available  9. Bladder: continent/incontinent  10. DVT Prophylaxis: mechanical  11. GI Prophylaxis: not indicated  12. Code: full  13. Disposition: home with family support  14. ELOS:  23  15. Follow up Appointments on Discharge: PCP in 1-2 weeks, neurology in 6-8 weeks after discharge      Patient was discussed with Dr. Erasmo Diaz, PM&R staff physician     Fabi Virgen, " PA-C  Physical Medicine & Rehabilitation

## 2023-05-04 NOTE — PROGRESS NOTES
S: Order received to fit patient with a right Samuel Neurexa as ordered by .  O/G: Support and stabilize right Shoulder  A:  Patient provided with a right XS samuel Neurexa.    P: contact orthotics if any issues arise.  MAURICIO Rose.

## 2023-05-04 NOTE — PLAN OF CARE
Physical Therapy Discharge Summary    Reason for therapy discharge:    Discharged to home with outpatient therapy.    Progress towards therapy goal(s). See goals on Care Plan in [facility] electronic health record for goal details.  Goals met    Therapy recommendation(s):    Pt discharged as SBA for stand pivot transfers w/ quad cane, SBA gait w/ R foot up and quad cane. Family training completed on 2 stairs to enter home, CGA w/ quad cane, daughter demonstrating competent assist. DME of narrow based quad cane, R foot up brace, gait belt from facility, [employer] rental w/c from vendor. Pt recommended to have 24/7 assist from family at discharge and to follow up w/ OP PT.

## 2023-05-04 NOTE — PLAN OF CARE
Speech Language Therapy Discharge Summary    Reason for therapy discharge:    Discharged to home with outpatient therapy.    Progress towards therapy goal(s). See [facility] on [facility] for goal details.  Goals partially met.  Barriers to achieving goals:   discharge from facility.    Therapy recommendation(s):    Continued therapy is recommended.  Rationale/Recommendations:  Continue addressing higher-level language and cognition.  Patient has been on an easy to chew diet but due to patient and family preference for softer food textures.  Patient has been able to show significant improvement in her verbal expression and ability to complete word finding tasks.  Suspect that some of the deficits were more due to cognition and reasoning which is also showing significant improvement over the course of rehab stay.  Patient would benefit from ongoing SLP intervention to continue maximizing her cognitive function and to decrease burden of care for IADL tasks.

## 2023-05-04 NOTE — DISCHARGE SUMMARY
General acute hospital   Acute Rehabilitation Unit  Discharge summary     Date of Admission: 4/14/2023  Date of Discharge: 5/5/2023  Disposition: home with family assist, outpatient PT, OT, SLP  Primary Care Physician: Ara Osman  Attending physician: Erasmo Diaz MD      DISCHARGE DIAGNOSIS      Left basal ganglia intraparenchymal hemorrhage     Right hemiparesis, right facial droop, right hemisensory deficit, right visual neglect, dysarthria, aphasia, dysphagia, impaired cognition    Hypertension    Hyperlipidemia    Pre-diabetes     Anemia    Major depressive disorder, recurrent episode, moderate      BRIEF SUMMARY  Mercy Osman is a 52 year old right hand dominant female with a past medical history of hypertension, [place]' palsy who was admitted on 4/8/23 with spontaneous acute left thalamic intracranial hemorrhage with hospital course complicated by hyperlipidemia, prediabetes, elevated troponin, anemia, hypokalemia, and loose stools.  She was admitted to ARU on 4/14/23 for multidisciplinary rehabilitation and ongoing medical management.  Rehab and medical course at ARU as below.    REHABILITATION COURSE  PT: Pt discharged as SBA for stand pivot transfers w/ quad cane, SBA gait w/ R foot up and quad cane. Family training completed on 2 stairs to enter home, CGA w/ quad cane, daughter demonstrating competent assist. DME of narrow based quad cane, R foot up brace, gait belt from facility, K3 rental w/c from vendor. Pt recommended to have 24/7 assist from family at discharge and to follow up w/ OP PT.    OT: Currently, pt requires CGA w/functional mobility w/quad cane. Pt requires CGA with g/h standing EOS with unilateral UE support. Pt requires set-up and supervision with UBD tasks and CGA with LBD tasks with quad cane. Pt requires SBA for donning/doffing socks/shoes with figure 4 technique; requires min A for donning foot-up and tying laces. Pt requires CGA with shower  transfers w/use of extended tub bench or shower chair and grab bars and requires min A to wash/rinse/dry 2/10 body parts and supervision with 8/10 body parts while seated on extended tub bench and using grab bars and CGA standing. Pt requires CGA with toilet transfer using quad cane and grab bars and CGA with toilet hygiene standing with quad cane. Pt provided with shower chair through [facility] [employer] covered through [place], and RUE XS samuel neurexa plus shoulder support. Family ordered NMES for RUE neuro re-education and sensorimotor skills to use for shoulder subluxation protocol and muscle retraining for forearm extensors. Provided pt and family on RUE HEP including proximal shoulder strengthening exercises, NMES protocols, and RUE WB'ing standing push-ups. Pt's family has ordered grab bars for shower and bathroom. Family training completed throughout sessions d/t daughter being present daily; daughter able to safely provide assistance w/all ADLs/IADLs. Family to provide initial 24/7 assistance upon discharge. Recommending OP OT services to increase IND and safety with ADLs/IADLs and functional mobility, and improve RUE function.      SLP: Discharged to home with outpatient therapy. Continued therapy is recommended. Rationale/Recommendations:  Continue addressing higher-level language and cognition.  Patient has been on an easy to chew diet but due to patient and family preference for softer food textures.  Patient has been able to show significant improvement in her verbal expression and ability to complete word finding tasks.  Suspect that some of the deficits were more due to cognition and reasoning which is also showing significant improvement over the course of rehab stay.  Patient would benefit from ongoing SLP intervention to continue maximizing her cognitive function and to decrease burden of care for IADL tasks.    MEDICAL COURSE    Left basal ganglia intraparenchymal hemorrhage, spontaneous, likely secondary  to uncontrolled hypertension  Vasogenic cerebral edema  Deficits: right hemiparesis, right facial droop, right hemisensory deficit, right visual neglect, dysarthria, aphasia, dysphagia, impaired cognition  - Long-term BP goal 140/90 or less, management as below  - R shoulder samuel neurexa and foot up with transfers and mobility  - Continue PT/OT/SLP  - Follow up with neurology 6-8 weeks after discharge      Hypertension  PTA on amlodipine-benazepril 10-20 mg capsule.  BP elevated to 220s/100s at presentation. Initially managed on nicardipine drip, then transitioned to PO lisinopril + amlodipine.  - Long-term BP goal 140/90 or less  - Continue Lisinopril 20 mg daily  - Continue amlodipine 5 mg daily (decreased  as BP trended down)  - Monitor BP daily at home, follow up with PCP, adjust regimen as indicated     Hyperlipidemia  Started on statin therapy this admission with .  - Continue atorvastatin 20 mg daily     Troponin elevation, asymptomatic, spontaneously resolved  EKG without obvious acute ischemic changes.  Echo WNL.  No cardiogenic symptoms, suspect transient stress related troponin leak.  Peaked to 113 on 4/10.  Troponin improved spontaneously without intervention.  No cardiogenic symptoms at ARU.     Hypokalemia, resolved s/p replacement  Hyperchloremia, resolved with adequate hydration    Stable/WNL on most recent labs : K 4.1, Cl 103     Pre-diabetes   Hgb A1c 6.2% 2023   - Monitor with surveillance with primary care outpatient     Anemia, chronic   Hgb 11.5 on presentation.  Hgb stable on most recent labs  at 12.3.     Major Depressive Disorder, recurrent episode, moderate  SW intake with PHQ-9 score 18, did indicate some passive suicidal ideation.  Per family, have noted some symptoms of depression since patient's son  of suicide ~3 years ago.  Psychiatry consulted at ARU, met with patient , appreciate assistance.  Discussed potential medication trial (Remeron 7.5 mg at HS),  but patient declined.  Initially also declined health psychology but later expressed willingness.  Unable to see due to scheduling.  - Monitor mood at discharge, follow up with PCP.  If ongoing concerns, would revisit referral for therapy vs trial of Remeron.      DISCHARGE MEDICATIONS  Current Discharge Medication List      START taking these medications    Details   acetaminophen (TYLENOL) 325 MG tablet Take 2 tablets (650 mg) by mouth every 4 hours as needed for mild pain or fever    Associated Diagnoses: Nontraumatic subcortical hemorrhage of left cerebral hemisphere (H)      senna-docusate (SENOKOT-S/PERICOLACE) 8.6-50 MG tablet Take 1-2 tablets by mouth nightly as needed for constipation  Qty: 30 tablet, Refills: 0    Associated Diagnoses: Nontraumatic subcortical hemorrhage of left cerebral hemisphere (H)         CONTINUE these medications which have CHANGED    Details   amLODIPine (NORVASC) 5 MG tablet Take 1 tablet (5 mg) by mouth daily  Qty: 30 tablet, Refills: 0    Associated Diagnoses: Benign essential hypertension      atorvastatin (LIPITOR) 20 MG tablet Take 1 tablet (20 mg) by mouth every evening  Qty: 30 tablet, Refills: 0    Associated Diagnoses: Nontraumatic subcortical hemorrhage of left cerebral hemisphere (H); Hyperlipidemia, unspecified hyperlipidemia type      lisinopril (ZESTRIL) 20 MG tablet Take 1 tablet (20 mg) by mouth daily  Qty: 30 tablet, Refills: 0    Associated Diagnoses: Benign essential hypertension               DISCHARGE INSTRUCTIONS AND FOLLOW UP  Discharge Procedure Orders   Physical Therapy Referral   Standing Status: Future   Referral Priority: Routine Referral Type: Rehab Therapy Physical Therapy   Number of Visits Requested: 1     Occupational Therapy Referral   Standing Status: Future   Referral Priority: Routine Referral Type: Occupational Therapy   Number of Visits Requested: 1     Speech Therapy Referral   Standing Status: Future   Referral Priority: Routine Referral  "Type: Therapeutic Services   Number of Visits Requested: 1     Reason for your hospital stay   Order Comments: You were admitted for rehabilitation following hemorrhagic stroke.     Activity   Order Comments: Your activity upon discharge: activity as tolerated and as directed by therapy team.  We recommend ongoing use of walker and wheelchair for mobility, with assist of family for all ambulation, transfers, and activities of daily living.  Continue to use R foot up and shoulder samuel neurexa brace/support with ambulation and transfers.  We have referred you to outpatient PT, OT, and speech therapy to continue to progress function and independence.     Order Specific Question Answer Comments   Is discharge order? Yes      Adult [facility] Follow-up and recommended labs and tests   Order Comments: Follow up with primary care provider, MANFRED QUINTERO, within 7-14 days for hospital follow- up.  No specific follow up labs or test are needed.      Follow up with neurology in 6-8 weeks.    Appointments on [place] and/or [facility] (with [facility] or [facility] provider or service). Call [phone] if you haven't heard regarding these appointments within 7 days of discharge.     Monitor and record   Order Comments: blood pressure daily and bring record to next appointment.     Discharge Instructions   Order Comments: Right arm: No twisting, pulling, or lifting upper extremity above 90 degrees of flexion or \"shoulder height\".   Support arm at all times to prevent further subluxation.   Shoulder brace should be worn out of bed during ambulation or transfers. Remove brace overnight.  Position arm on arm tray when seated in wheelchair and on pillows when seated or lying in bed. Position arm in a neutral position.   Apply right resting hand splint to patient's at bedtime until morning. Apply fabric strapping to secure extremity.   Remove splint if painful, change in sensation, or change in skin color.  Completely remove splint in " morning, complete skin assessment and wash/dry extremity. Clean and dry daily after removal.     Diet   Order Comments: Follow this diet upon discharge: Easy to Chew (level 7); Thin Liquids (level 0)     Order Specific Question Answer Comments   Is discharge order? Yes           PHYSICAL EXAMINATION    Most recent Vital Signs:   Vitals:    05/02/23 0810 05/02/23 1815 05/03/23 0821 05/03/23 1530   BP: 121/82 109/66 102/75 111/65   BP Location: Left arm Left arm Left leg Left arm   Patient Position: Chair  Chair    Cuff Size: Adult Regular  Adult Regular    Pulse: 77 79 85 101   Resp: 18 20 18 16   Temp: 97.5  F (36.4  C) 97  F (36.1  C) 98  F (36.7  C) 97  F (36.1  C)   TempSrc: Oral Oral Oral Oral   SpO2: 99% 99% 98% 98%   Weight:       Height:         Gen: NAD  Cardio: appears well-perfused  Pulm: non-labored on room air  Abd: soft, non-tender, non-distended  Ext: no appreciable edema in bilat lower extremities  Neuro/MSK: awake, alert, PERRL, left upper facial weakness, right lower facial droop, right hemiparesis.    Discharge summary was forwarded to Ara Osman (PCP) at the time of discharge, so as to bridge from hospital to outpatient care.     It was our pleasure to care for Mercy Osman during this hospitalization. Please do not hesitate to contact me should there be questions regarding the hospital course or discharge plan.      TT 35 minutes  Erasmo Diaz MD   Physical Medicine & Rehabilitation

## 2023-05-04 NOTE — PROGRESS NOTES
Occupational Therapy Discharge Summary    Reason for therapy discharge:    Discharged to home with outpatient therapy.    Progress towards therapy goal(s). See goals on Care Plan in [facility] electronic health record for goal details.  Goals met    Therapy recommendation(s):    Continued therapy is recommended.  Rationale/Recommendations:  Recommend OP OT services to increase IND and safety with ADLs/IADLs and functional mobility..     Currently, pt requires CGA w/functional mobility w/quad cane. Pt requires CGA with g/h standing EOS with unilateral UE support. Pt requires set-up and supervision with UBD tasks and CGA with LBD tasks with quad cane. Pt requires SBA for donning/doffing socks/shoes with figure 4 technique; requires min A for donning foot-up and tying laces. Pt requires CGA with shower transfers w/use of extended tub bench or shower chair and grab bars and requires min A to wash/rinse/dry 2/10 body parts and supervision with 8/10 body parts while seated on extended tub bench and using grab bars and CGA standing. Pt requires CGA with toilet transfer using quad cane and grab bars and CGA with toilet hygiene standing with quad cane. Pt provided with shower chair through [facility] [employer] covered through [place], and RUE XS samuel neurexa plus shoulder support. Family ordered NMES for RUE neuro re-education and sensorimotor skills to use for shoulder subluxation protocol and muscle retraining for forearm extensors. Provided pt and family on RUE HEP including proximal shoulder strengthening exercises, NMES protocols, and RUE WB'ing standing push-ups. Pt's family has ordered grab bars for shower and bathroom. Family training completed throughout sessions d/t daughter being present daily; daughter able to safely provide assistance w/all ADLs/IADLs. Family to provide initial 24/7 assistance upon discharge. Recommending OP OT services to increase IND and safety with ADLs/IADLs and functional mobility, and improve RUE function.

## 2023-05-04 NOTE — PLAN OF CARE
FOCUS/GOAL  Bowel management, Discharge planning, and Mobility    ASSESSMENT, INTERVENTIONS AND CONTINUING PLAN FOR GOAL:    Pt alert and oriented x4 (didn't know today's date but oriented to month and year) but forgetful.   Pt has word finding difficulty and forgetfulness per daughter. Pt didn't remember her .   VSS. Denies pain, chest pain or SOB.  Needs CGA w/ quad cane and R foot up brace for functional mobility.   Continent of bowel and bladder, had soft bm x3 today.   Daughter is at bedside, helping pt with ADLs.   Making progress, plan to discharge home tomorrow at 11am.

## 2023-05-04 NOTE — CONSULTS
PSYCHOLOGY VISIT    Attempted to see patient for psychological interventions and assistance before they discharged.  Mercy was extremely happy to be going home tomorrow and declined psychotherapy intervention. She was quite pleased at the progress she made in regards to her walking and upper extremity functioning. She was instructed to have any physician place a psychotherapy/psychology referral or consult in should she require psychological services in the future.     Shayy Presley PsyD    Not a billable visit.

## 2023-05-05 VITALS
WEIGHT: 108.03 LBS | HEIGHT: 58 IN | DIASTOLIC BLOOD PRESSURE: 69 MMHG | OXYGEN SATURATION: 97 % | HEART RATE: 82 BPM | SYSTOLIC BLOOD PRESSURE: 116 MMHG | TEMPERATURE: 97.6 F | RESPIRATION RATE: 18 BRPM | BODY MASS INDEX: 22.68 KG/M2

## 2023-05-05 PROCEDURE — 250N000013 HC RX MED GY IP 250 OP 250 PS 637: Performed by: PHYSICIAN ASSISTANT

## 2023-05-05 PROCEDURE — 99239 HOSP IP/OBS DSCHRG MGMT >30: CPT | Performed by: PHYSICAL MEDICINE & REHABILITATION

## 2023-05-05 RX ADMIN — LISINOPRIL 20 MG: 20 TABLET ORAL at 08:49

## 2023-05-05 RX ADMIN — AMLODIPINE BESYLATE 5 MG: 5 TABLET ORAL at 08:50

## 2023-05-05 ASSESSMENT — ACTIVITIES OF DAILY LIVING (ADL)
ADLS_ACUITY_SCORE: 37

## 2023-05-05 NOTE — PROGRESS NOTES
"Met with pt and dtr at bedside. Confirmed pt dtr contact information (Kayli Ph: [phone]) and added it to pt AVS. Reiterated and reminded pt and dtr about HCD and POA and provided two new blank copies. Discussed importance and getting notarized once completed. Reminded dtr of [place] [facility] and offered referral, dtr in agreement, referral place and information added to pt AVS. As planned, [facility]. Dtr updated. IRF questions completed with help of dtr, declined . Pt expressed being sad and at times, \"being better off dead\" but \"thinks about my children and doesn't want to die\". Dtr there for that discussion and aware. Dtr appears to provide emotional support. Discussed transition home and expectations and ongoing therapy with goal to continue to make progress. Discussed emotional support from [facility]. Pt and pt dtr expressed understanding. Pt also shared that she can not read, family has to A with all written material. Make note for future reference. Discharging home today. Dtr will transport. No additional questions or concerns.     [facility] Pain Assessment  Pain Effect on Sleep  \"Over the past 5 days, how much of the time has pain made it hard for you to sleep at night?\"    0. Does not apply - I have not had any pain or hurting in the past 5 days--Only complained of being \"numb\" on R side.     Sylvia Turner, [facility]   Direct Phone: [phone]  I   Pager: [phone]  I  Fax: [phone]      "

## 2023-05-05 NOTE — PLAN OF CARE
FOCUS/GOAL  Medical management    ASSESSMENT, INTERVENTIONS AND CONTINUING PLAN FOR GOAL:  Daugther at bedside this shift. They requested to not be disturbed. No concerns reported. Will continue wit hPOC.  Goal Outcome Evaluation:

## 2023-05-05 NOTE — PLAN OF CARE
Goal Outcome Evaluation:  FOCUS/GOAL  Medication management, Medical management, Discharge planning, Mobility and Reinforcement of self-care/ADL    ASSESSMENT, INTERVENTIONS AND CONTINUING PLAN FOR GOAL:  Patient is alert/oriented x4, is Hmong speaking but understands some, daughter and other family here at time of discharge.  Patient denies any pain, VSS today, takes medications without concerns.  Per patient's daughter they do have a BP cuff at home, encouraged checking daily prior to meds and keeping track for the primary care team.  Patient was very encouraged today now that the shoulder sling is on and helps her to feel more balanced with repositioning and walking.  Medications given at discharge and reviewed with patient and daughter, follow up appointments and discharge summary reviewed as well.  No additional care concerns, continue with POC.  Patient escorted with ARU staff via w/c at 10:40am.

## 2023-05-08 ENCOUNTER — PATIENT OUTREACH (OUTPATIENT)
Dept: CARE COORDINATION | Facility: CLINIC | Age: 53
End: 2023-05-08
Payer: COMMERCIAL

## 2023-05-08 ASSESSMENT — ACTIVITIES OF DAILY LIVING (ADL)
DEPENDENT_IADLS:: CLEANING;COOKING;LAUNDRY;SHOPPING;MEAL PREPARATION;MEDICATION MANAGEMENT;MONEY MANAGEMENT;TRANSPORTATION;INCONTINENCE

## 2023-05-08 NOTE — PROGRESS NOTES
Stroke RN Care Coordination - Initial Outreach Note     SITUATION     Mercy Osman is a 52 year old female who is receiving support for:  Clinic Care Coordination - Initial (Stroke RNCC)      BACKGROUND     MEDICAL COURSE     Left basal ganglia intraparenchymal hemorrhage, spontaneous, likely secondary to uncontrolled hypertension  Vasogenic cerebral edema  Deficits: right hemiparesis, right facial droop, right hemisensory deficit, right visual neglect, dysarthria, aphasia, dysphagia, impaired cognition  - Long-term BP goal 140/90 or less, management as below  - R shoulder samuel neurexa and foot up with transfers and mobility  - Continue PT/OT/SLP  - Follow up with neurology 6-8 weeks after discharge      Hypertension  PTA on amlodipine-benazepril 10-20 mg capsule.  BP elevated to 220s/100s at presentation. Initially managed on nicardipine drip, then transitioned to PO lisinopril + amlodipine.  - Long-term BP goal 140/90 or less  - Continue Lisinopril 20 mg daily  - Continue amlodipine 5 mg daily (decreased 5/4 as BP trended down)  - Monitor BP daily at home, follow up with PCP, adjust regimen as indicated     Hyperlipidemia  Started on statin therapy this admission with .  - Continue atorvastatin 20 mg daily     Troponin elevation, asymptomatic, spontaneously resolved  EKG without obvious acute ischemic changes.  Echo WNL.  No cardiogenic symptoms, suspect transient stress related troponin leak.  Peaked to 113 on 4/10.  Troponin improved spontaneously without intervention.  No cardiogenic symptoms at ARU.     Hypokalemia, resolved s/p replacement  Hyperchloremia, resolved with adequate hydration    Stable/WNL on most recent labs 5/1: K 4.1, Cl 103     Pre-diabetes   Hgb A1c 6.2% 4/8/2023   - Monitor with surveillance with primary care outpatient     Anemia, chronic   Hgb 11.5 on presentation.  Hgb stable on most recent labs 5/1 at 12.3.     Major Depressive Disorder, recurrent episode, moderate  SW intake  "with PHQ-9 score 18, did indicate some passive suicidal ideation.  Per family, have noted some symptoms of depression since patient's son  of suicide ~3 years ago.  [facility] consulted at [facility], met with patient , appreciate assistance.  Discussed potential medication trial (Remeron 7.5 mg at ), but patient declined.  Initially also declined health psychology but later expressed willingness.  Unable to see due to scheduling.  - Monitor mood at discharge, follow up with PCP.  If ongoing concerns, would revisit referral for therapy vs trial of Remeron.    ASSESSMENT        23 1502   Functional Status   Do you have any residual effects from the stroke Yes, I do  (\"there has been no improvement\")   Weakness Yes   Weakness location Right side   Numbness Yes   Numbness location Right side   Tingling Yes   Tingling location Right side   Fatigue Yes   Dizziness or Light-Headedness No  (some days hears a buzzing sound)   Memory Concerns recent  (forgetful)   Other concerns: Language barrier   Dependent ADLs: Bathing;Dressing;Transfers;Toileting;Grooming;Eating;Wheelchair-with assist;Positioning   Please indicate your level of independence in the following activities: Walking Unable   Please indicate your level of independence in the following activities: Eating (including cutting food) Dependent   Please indicate your level of independence in the following activities: Going up and down stairs Unable   Please indicate your level of independence in the following activities: Dressing and undressing (including shoes) Dependent   Please indicate your level of independence in the following activities: Bathing/showering including grooming Dependent   Please indicate your level of independence in the following activities: Using the toilet Dependent   Do you have bowel incontinence? Sometimes   Do you have bladder incontinence? Sometimes   Fallen 2 or more times in the past year? No   Any fall with injury in the past " year? No   Resources and Support   Could you live alone without any help from another person? This means being able to bathe, use the toilet, shop, prepare or get meals, and manage finances No   Are you able to use electronics such as computers, tablets, and smartphones? No   Do you cook or do any house chores? No   Do you do your own shopping (including online shopping)? No   Do you manage your own finances (balancing your checkbook, paying your bills, etc.)? No   What is your work status? Unemployed   What kind of work do you do (or did you do before the stroke)? Do not work outside the home   How do you get around? Family member drives me   What is your living situation right now? With family or significant other   Dependent IADLs: Cleaning;Cooking;Laundry;Shopping;Meal Preparation;Medication Management;Money Management;Transportation;Incontinence   Current living arrangement: I live in a private home with family   Type of residence: Private home [facility]   What was your living situation before the stroke? With family or significant other   Are you currently doing any physical therapy? Yes   How often Outpatient scheduled 05/18/2023   Are you currently doing any occupational therapy? Yes   How often outpatient scheduled 05/10/2023   Are you currently doing any speech therapy? Yes   How often outpatient scheduled 05/09/2023   Are you currently doing any other therapy? No   Do you get any home health services? No, but family helps a lot   Community Resources DME   Mental health DX: Yes   Mental health DX how managed: None   Informal Support system: Children   Medications and Follow-up   How do you take your medications? Someone helps or supervises me when I take them   Do you sometimes miss or forget to take your medications? How often? Rarely   Medication adherence problem (GOAL): No   Effective medication organization strategy in place: Yes   Knowledgeable about how to use meds: Yes   Medication side effects  suspected: No   Difficulty keeping appointments: No   Compliance Concerns No   No-Show Concerns No   No PCP office visit in Past Year No   Do you have the recommended follow-up scheduled? Yes   Risk Factor Management   Do you check your blood pressure at home? Yes   What are the blood pressure numbers that you usually get at home? 117/88   Do you check your blood sugar at home? No   Is there any second-hand smoke at home? No   Stroke Care Coordination Outreach Frequency   Stroke Care Coordination Outreach Frequency Monthly   OTHER   History of Anemia? Yes         Pt specific risk factors for stroke or TIA (transient ischemic attack):    Your Risk Factors Your Results Normal Ranges   High blood pressure  Less than 120/80   Cholesterol Total No results found for: CHOL   Less than 150    Triglycerides No results found for: TRIG Less than 150   LDL Lab Results   Component Value Date     04/08/2023       Less than 70   HDL No results found for: HDL      Greater than 40 (men)  Greater than 50 (women)   Diabetes Recent Labs   Lab Test 04/08/23  0842   A1C 6.2*    Less than 7   Smoking/tobacco use  Quit smoking and tobacco   Overweight  Lose 1-2 pounds a week   Lack of exercise  30 minutes moderate activity each day     Other risk factors include carotid (neck) artery disease, atrial fibrillation and stress. You may be on new medicine to treat high blood pressure, cholesterol, diabetes or atrial fibrillation.    Stroke warning signs and symptoms - CALL 911 right away for:  - Sudden numbness or weakness in the face, arm or leg (often on one side of the body).  - Sudden confusion or trouble understanding what is going on.  - Sudden blurred or decreased vision in one or both eyes.  - Sudden trouble speaking, loss of balance, dizziness or problems with coordination.  - Sudden, severe headache for no reason.  - Fainting or seizures.  - Symptoms may go away then come back suddenly.    PLAN     Care Plan: Stroke        Problem: Stroke Risk Factors       Goal: Blood Pressure Management       Note:     Goal less then 140/90.   Lisinopril 20 mg  Amlodipine 5 mg  Checking daily at home.                 Goal: Hyperlipidemia Management       Note:     LDL: 142.  Started Atorvastatin 20mg                Goal: Diabetes Management       Note:     A1C 6.2% on 04/08/2023. Not monitoring blood sugars at home.                 Goal: Nutrition               Goal: Stroke Medication Management               Goal: Smoking Cessation  Completed 5/8/2023      Note:     Denies current tobacco use or second hand smoke exposure.                         Problem: Stroke Patient Support       Goal: Improve social support system       Note:     Lives with son, daughter in law, daughter                Goal: Improve Mental Health Support       Note:     Plan to discuss at future scheduled follow up with Primary Care Provider on 05/12/2023 therapy versus medication.                 Goal: Improve Functional Status       Note:     Outpatient ST/OT/PT.   Currently dependent with all ADLs/IADLs                        Problem: Stroke Care Follow-Up       Goal: PCP Follow-Up       Note:     Scheduled Friday, May 12th at 10:55 am.                 Goal: Stroke Neurology Follow-Up       Note:     Scheduled 05/31/2023 at 08:00 am                 Goal: Additional Specialty Follow-Up       Note:     ST: 05/09/2023  OT: 05/10/2023  PT: 05/18/2023                            Follow-up plan:  Patient/caregiver will call RNCC with questions, concerns, support needs. RNCC will be available as needed.  RNCC will send stroke care coordination introduction letter and care plan to patient via mail. RNCC will follow up with patient/caregiver again in 1 month.     Irlanda Keller, RN, BSN   Covering RN Stroke Neurology Care Coordinator  [facility] Neuroscience Service Line

## 2023-05-08 NOTE — LETTER
May 8, 2023      Mercy Osman  [address] [address]             Dear Mercy,    I am a stroke nurse who works with the doctors and staff at the stroke clinics to manage patient care. I am here to answer any questions or concerns about your stroke care.       A stroke nurse knows the health care system. My goals are to help you during your recovery and to improve your access to care. I will work with your stroke team to help you identify your health and social needs.      We will:    Create a care plan that supports communication between you and your stroke care team.   Help you obtain health care and local resources.   Give you the information and knowledge you may need for your stroke care.    Work with you to remove any barriers you may have during your stroke recovery.     We work to give you the highest quality healthcare. Please call me at [phone] with any questions about your stroke care or recovery.     Sincerely,      Irlanda Keller, RN, BSN  Covering RN Stroke Neurology Care Coordinator  [facility] Neuroscience Service Line     Dear Mercy,    Below is your plan of care as a patient in the stroke care coordination program. Please let us know if we can help you with other questions, concerns, or goals.      [facility]   Patient-Centered Stroke Plan of Care     About Me    Patient Name:  Mercy Osman  YOB: 1970  Age:                      52 year old   [facility] MRN:     [id number] Telephone Numbers:   Home Phone [phone]   Mobile [phone]       Address:  [address] [address] Email address:  No e-mail address on record      Emergency Contact(s)   Name Relationship Lgl Grd Work Phone Home Phone Mobile Phone   1. VANNESA FERNANDEZ Son   [phone]    2. CARIN FERNANDEZ Daughter    [phone]   3. DIAN (PRIMARY* Daughter    [phone]        Primary language:  Hmong     needed? Yes   [facility] [employer]:  [phone] op. 1  Other  communication barriers:Language barrier    Preferred Method of Communication:     Current living arrangement: I live in a private home with family    Mobility Status/ Medical Equipment: No data recorded       My Care Team Members  Patient Care Team         Relationship Specialty Notifications Start End    Ara Osman MD PCP - General [facility]  10/26/18     Phone: [phone] Fax: [phone]         [facility] [address] [facility] [address]    Rosita Koenig, RN Specialty Care Coordinator Neurology Admissions 4/19/23     Phone: [phone]         Simran Turner APRN CNP Nurse Practitioner Neurology  4/20/23     Phone: [phone] Fax: [phone]         [address] [address]    Helena Castellano MD MD Neurology  4/20/23     Phone: [phone] Fax: [phone]         [address] [facility] [address] [facility] [address]             My Access Plan   Medical Emergency 911   Primary Clinic Line (available 24 hours) [phone]   [facility] Nurse Triage [phone] ([phone]) and ask to be directed to the [facility] Nurse Triage Line    Preferred Pharmacy No Pharmacies Listed   Behavioral Health Crisis Line 698 Suicide and Crisis Lifeline     My Care Plans    Self-Management and Treatment Plan   Care Plan: Stroke       Problem: Stroke Risk Factors       Goal: Blood Pressure Management       Note:     Goal less then 140/90.   Lisinopril 20 mg  Amlodipine 5 mg  Checking daily at home.                 Goal: Hyperlipidemia Management       Note:     LDL: 142.  Started Atorvastatin 20mg                Goal: Diabetes Management       Note:     A1C 6.2% on 04/08/2023. Not monitoring blood sugars at home.                 Goal: Nutrition               Goal: Stroke Medication Management                       Problem: Stroke Patient Support       Goal: Improve social support system       Note:     Lives with son, daughter in law, daughter                Goal: Improve  Mental Health Support       Note:     Plan to discuss at future scheduled follow up with Primary Care Provider on 05/12/2023 therapy versus medication.                 Goal: Improve Functional Status       Note:     Outpatient ST/OT/PT.   Currently dependent with all ADLs/IADLs                        Problem: Stroke Care Follow-Up       Goal: PCP Follow-Up       Note:     Scheduled Friday, May 12th at 10:55 am.                 Goal: Stroke Neurology Follow-Up       Note:     Scheduled 05/31/2023 at 08:00 am                 Goal: Additional Specialty Follow-Up       Note:     ST: 05/09/2023  OT: 05/10/2023  PT: 05/18/2023                               Stroke - Call 911   Remember: BE FAST and call 911 with any of these stroke symptoms:   BALANCE--Sudden loss of balance or coordination. Example: trouble walking   EYES--Sudden problem seeing out of one or both eyes   FACE--Sudden numbness or change to one side of the face. Examples: facial droop, uneven smile   ARM--Sudden numbness or weakness in one arm or leg   SPEECH--Sudden changes in speech or talking that doesn t make sense   TIME--if the person is having any of the symptoms above, CALL 911 right away.   Sudden, worst headache of your life   Symptoms may go away, then come back.     Stroke Resources   [place] [facility]   Ph: [phone] or [phone]   Website: www.braininjurymn.org    To apply for Free Support after Brain Injury or Stroke, visit:  https://www.braininjurymn.org/resource-facilitation/index.php     [place] [employer]   Ph: [phone]   Website: http://www.strokemn.org    Directory of support groups and other resources.   Address: [address] [place] [address], [place] [address]     My Medical and Care Information  Problem List:    Patient Active Problem List   Diagnosis    ICH (intracerebral hemorrhage) (H)       Current Medications:  Current Outpatient Medications   Medication Sig Dispense Refill     acetaminophen (TYLENOL) 325 MG tablet Take 2 tablets (650 mg) by mouth every 4 hours as needed for mild pain or fever      amLODIPine (NORVASC) 5 MG tablet Take 1 tablet (5 mg) by mouth daily 30 tablet 0    atorvastatin (LIPITOR) 20 MG tablet Take 1 tablet (20 mg) by mouth every evening 30 tablet 0    lisinopril (ZESTRIL) 20 MG tablet Take 1 tablet (20 mg) by mouth daily 30 tablet 0    senna-docusate (SENOKOT-S/PERICOLACE) 8.6-50 MG tablet Take 1-2 tablets by mouth nightly as needed for constipation 30 tablet 0     Allergies:  No Known Allergies    Stroke Care Coordination Enrollment    Stroke Care Coordination Start Date: 4/13/2023   Frequency of Stroke Care Coordination: Monthly       Form Last Updated: 05/08/2023

## 2023-05-09 ENCOUNTER — HOSPITAL ENCOUNTER (OUTPATIENT)
Dept: SPEECH THERAPY | Facility: REHABILITATION | Age: 53
Discharge: HOME | End: 2023-05-09
Attending: PHYSICIAN ASSISTANT
Payer: COMMERCIAL

## 2023-05-09 DIAGNOSIS — I61.0 NONTRAUMATIC SUBCORTICAL HEMORRHAGE OF LEFT CEREBRAL HEMISPHERE (H): ICD-10-CM

## 2023-05-09 DIAGNOSIS — R41.3 MEMORY LOSS: ICD-10-CM

## 2023-05-09 DIAGNOSIS — R47.01 APHASIA: Primary | ICD-10-CM

## 2023-05-09 PROCEDURE — 92523 SPEECH SOUND LANG COMPREHEN: CPT | Mod: GN

## 2023-05-10 ENCOUNTER — HOSPITAL ENCOUNTER (OUTPATIENT)
Dept: OCCUPATIONAL THERAPY | Facility: REHABILITATION | Age: 53
Discharge: HOME | End: 2023-05-10
Attending: PHYSICIAN ASSISTANT
Payer: COMMERCIAL

## 2023-05-10 DIAGNOSIS — Z78.9 DECREASED ACTIVITIES OF DAILY LIVING (ADL): ICD-10-CM

## 2023-05-10 DIAGNOSIS — I61.0 NONTRAUMATIC SUBCORTICAL HEMORRHAGE OF LEFT CEREBRAL HEMISPHERE (H): ICD-10-CM

## 2023-05-10 DIAGNOSIS — G81.91 RIGHT HEMIPARESIS (H): Primary | ICD-10-CM

## 2023-05-10 PROCEDURE — 97165 OT EVAL LOW COMPLEX 30 MIN: CPT | Mod: GO | Performed by: OCCUPATIONAL THERAPIST

## 2023-05-10 NOTE — PROGRESS NOTES
"   05/09/23 0911       Present Yes   Comments In person Hmong  present for evaluation. Daughter also present and occasionally interpreted information to her mother.   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 Mercy was accompanied by her daughter and Hmong  to the evaluation; her primary language is Hmong. Mercy lives at home with her daughters and sons (total of 7 people in the home). Per hospital note in EMR on 4-14-23, \"past medical history of hypertension, Fanshawe' palsy who was admitted on 4/8/23 with spontaneous acute left thalamic intracranial hemorrhage with hospital course complicated by hyperlipidemia, prediabetes, elevated troponin, anemia, hypokalemia, and loose stools.  She was admitted to ARU on 4/14/23 for multidisciplinary rehabilitation and ongoing medical management.      Per SLP Inpatient discharge note from 5/4/2023:  Continued therapy is recommended.  Rationale/Recommendations: Continue addressing higher-level language and cognition.  Patient has been on an easy to chew diet but due to patient and family preference for softer food textures.  Patient has been able to show significant improvement in her verbal expression and ability to complete word finding tasks. Suspect that some of the deficits were more due to cognition and reasoning which is also showing significant improvement over the course of rehab stay.  Patient would benefit from ongoing SLP " "intervention to continue maximizing her cognitive function and to decrease burden of care for IADL tasks .     Currently, Mercy reports difficulty with memory, word finding, and eating. Pt feels her speech has slowed down and is less fluent since her stroke. Patient and daughter report it can take Mercy a while to find the word she wants to use and sometimes she uses words that do not make sense with the rest of the sentence; daughter reports Mercy does not realize when she uses a word that does not make sense and is unrelated to the rest of the sentence or conversation. Patient s daughter reports the words Mercy does use are easy to understand.      Regarding swallowing, patient reports she has numbness in her mouth/throat. Mercy's daughter reports Mercy will cough when eating if the meal is not prepared correctly. She notes Mercy has a hard time chewing her food. Mercy also reports she is unable to taste her food. Daughter has tried cooking with different flavors and Mercy was not able to taste the difference. Daughter also notes Mercy frequently pockets her food.    Prior Level Of Function Comment Independent   Current Community Support  Family/friend caregiver;Therapy services   Patient Role/employment History Homemaker   General Observations Pt appeared and reported sadness related to her current deficits. She is concerned her symptoms will not improve.   Patient/family Goals Pt states goal as follows, \"to get better\".   Fall Risk Screen   Fall screen completed by SLP   Have you fallen 2 or more times in the past year? No   Have you fallen and had an injury in the past year? No   Is patient a fall risk? Yes   Fall screen comments Upcoming physical therapy evaluation   Abuse Screen (yes response referral indicated)   Feels Unsafe at Home or Work/School no   Feels Threatened by Someone no   Does Anyone Try to Keep You From Having Contact with Others or Doing Things Outside Your Home? no   Physical Signs of Abuse Present no "   Patient needs abuse support services and resources No   Speech   Speech Comments Unable to formally assess at this time due to time limitations. Daughter reports Mercy's speech is easy to understand. Consider evaluating speech once treatment is initiated.    Language: Auditory Comprehension (understanding of spoken language)   Comments (Auditory Comprehension) Informally assessed through patient/family interview, informal assessment, chart review, and clinical observation. Recommend further assessment of auditory comprehension when treatment is initiated. Pt benefits from shortened instructions, repetition, and visual reminders. Pt reports she is able to understand what others say to her but has difficulty remembering what they told her later.    Language: Verbal Expression (use of spoken language to express information)   Comments (Verbal Expression) Verbal expression was evaluated through informal assessment, patient interview, and partial adapted CLQT administration. Pt experiences word finding difficulty, especially in the conversation level. In the picture naming subtest of the RBANS, pt was able to provide the following personal facts: when she was born, where she was born, and age. Patient unable to provide current address. Daughter reports she used to be able to do this. In confrontational naming subtest of the CLQT, patient was able to label 8 out of 10 images. The images that patient was unable to name are names her daughter reports she previously knew. During story retell subtest, the [facility]  presented the story two times and then the patient was asked to retell the story. Patient was able to retell the story using 6 out of the 18 crucial elements of the story. When then asked yes/no questions about the story, patient was able to correctly answer 6 out of 6 questions. During generative naming subtest of the assessment, patient was able to name 7 different animals in one minute. All animals  named were farm animals. In a separate generative naming task, patient was asked to name as many fruits and vegetables as she could in one minute. Patient was able to name 7 different fruits and vegetables. Based on results, clinical observations, Mercy Osman presents with moderate expressive language deficits.    Reading Comprehension (understanding of written language)   Comments (Reading Comprehension) Not formally assessed this date due to time constraint. Mercy Osman reports she does not read and did not read before the stroke.   Written Expression (use of writing to express information)   Comments (Written Expression) Not formally assessed this date due to time constraint. Patient reports she does not write and didn't prior to stroke. However, prior to stroke, pt's daughter reports she could write her name and now she is unable. Difficulty writing because she used to be right handed and now has to use her left hand to write.    Pragmatics (the social or functional use of a language)   Deficits noted in Nonverbal None   Deficits noted in Conversational Skills None   Deficits noted in the Use of Linguistic Context None   Deficits noted in the Organization of Narrative; RICE None   Cognitive Status Examination   Attention intact   Behavioral Observations WFL   Short Term Memory impaired   Long Term Memory unable to assess  (unable to assess due to time limitations)   Reasoning unable to assess  (unable to assess due to time limitations)   Organization   (unable to assess due to time limitations)   Standardized cognitive-linguistic assessment completed CLQT  (Partial and adapted CLQT administered.)   Cognitive Status Exam Comments Portions and adapted subtests of the CLQT was administered to Mercy Osman on May 16, 2023. Unable to standardized assessment because this test is standardized to English speakers only. Mercy's primary language is Hmong and therefore the scores are invalid. Mercy Osman presents with moderate  cognitive-linguistic deficits following CVA. Deficits were observed in the areas of word finding and memory. Deficits also noted in swallowing; further assessing needed. A course of skilled speech therapy targeting the cognitive-linguistic goals is recommended to improve pt's ability to participate in daily conversations with family and friends, and to improve overall communication and quality of life.    Education Assessment   Barriers to Learning Reading;Language;Physical;Emotional   Preferred Learning Style Listening;Demonstration;Pictures/video   General Therapy Interventions   Planned Therapy Interventions Dysphagia Treatment;Language   Dysphagia treatment Instruction of safe swallow strategies   Language Verbal expression   Clinical Impression, SLP Eval   Criteria for Skilled Therapeutic Interventions Met (SLP Eval) Yes, treatment indicated   SLP Diagnosis Moderate cognitive-linguistic deficits ; dysphagia   Rehab potential affected by Carryover of home program recommendations   Therapy Frequency 1 time;per week   Predicted Duration of Therapy Intervention (days/wks) 3-6 months, pending progress   Risks and Benefits of Treatment have been explained. Yes   Patient, Family & other staff in agreement with plan of care Yes   Clinical Impression Comments Based on the patient/family interview, chart review, informal assessment, partial adapted CLQT administration, and clinical observations, Mercy Osman presents with moderate cognitive-linguistic deficits characterized by difficulty with word finding and memory following CVA. Deficits in swallowing anticipated but further testing is needed. Her reduced cognitive-communication abilities negatively impact functional communication at home and in the community. It is recommended that Mercy Osman participate in SLP therapy at a frequency of 1x/week for 3-6 months, pending progress. Skilled speech-language therapy services are medically necessary in order to address  cognitive-linguistic skills and improve overall communication abilities.    Cognitive/Communication Goals   Cognitive/Communication Goals 1;2;3;4   Cognitive/Communication Goal 1   Goal Identifier 1. Word Finding Strategies   Goal Description Mercy will demonstrate use of 2-3 word finding strategies/circumlucation to support verbal expression with mild cues.   Target Date 08/06/23   Cognitive/Communication Goal 2   Goal Identifier 2.  Swallowing Eval   Goal Description Mercy will complete clinical swallow evaluation to assess swallow function and determine recommended swallow strategies and exercises.   Target Date 08/06/23   Cognitive/Communication Goal 3   Goal Identifier 3. Verbal Expression   Goal Description Mercy will complete moderate level verbal expression tasks such sentence generation, describing and expressing thoughts in structured conversational situations to improve verbal expression needed for daily communication and vocational needs with 90% accuracy and minimal assistance.   Target Date 08/06/23   Cognitive/Communication Goal 4   Goal Identifier 4. Home Program   Goal Description Mercy will verbalize understanding of a home programming recommendations each session to facilitate carryover from therapy session.   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  (Physician co-signature of this document indicates review and certification of the therapy plan).     The risks and benefits have been explained. These results, goals, and recommendations were discussed and agreed upon. It was a pleasure  to work with Mercy Jacqui. Thank you for your referral. If you have any questions or concerns, please feel free to contact me at your convenience.    Yung Mccormick MS, CCC-SLP  Speech Language Pathologist  Outpatient [facility]  [email or web address]  [email or web address]

## 2023-05-10 NOTE — PROGRESS NOTES
[facility]          OUTPATIENT OCCUPATIONAL THERAPY  EVALUATION  PLAN OF TREATMENT FOR OUTPATIENT REHABILITATION  (COMPLETE FOR INITIAL CLAIMS ONLY)  Patient's Last Name, First Name, M.I.  YOB: 1970  Mercy Osman                           Provider's Name  [facility] Medical Record No.  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  Target Date: 08/02/23     Goal Description: Patient to demonstrate a R UE HEP(including NMES) with SBA and min cues for good follow through at home and increase functional strength and ROM for maximum independence with performance of ADLs.  Target Date: 08/02/23     Goal Description: Patient to demonstrate functional tasks (feeding self, wiping the counter/table, washing dishes, etc.) with 30% use of R UE as gross stabilizer or gross assist for increased ADL/IADL independence  Target Date: 08/02/23     Goal Description: Patient to demonstrate improved R   strength to 5# for increased ADL independence (hanging onto objects for hygiene, feeding self, completing B UE tasks, etc.).  Target Date: 08/02/23      Goal Description: Patient will demonstrate  "3+/5 strength R elbow for increased independence with ADL tasks.  Target Date: 08/02/23      Therapy Frequency: once a week     Predicted Duration of Therapy Intervention (days/wks): 12 weeks  Yady Ceron OT          I CERTIFY THE NEED FOR THESE SERVICES FURNISHED UNDER        THIS PLAN OF TREATMENT AND WHILE UNDER MY CARE     (Physician co-signature of this document indicates review and certification of the therapy plan).                 Certification date from: 05/10/23, Certification date to: 08/02/23               Referring Physician: Fabi Virgen PA-C     Initial Assessment        See Epic Evaluation      Start Of Care Date: 05/10/23       05/10/23 0700   Quick Adds   Quick Adds Certification   Type of Visit Initial Outpatient Occupational Therapy Evaluation       Present Yes   Language Hmong    Comment [facility] [place] [place] [id number]   General Information   Start Of Care Date 05/10/23   Referring Physician Fabi Virgen PA-C   Orders Evaluate and treat as indicated   Orders Date 05/03/23   Medical Diagnosis 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  She was admitted to ARU on 4/14/23 for multidisciplinary rehabilitation and ongoing medical management. \"   Role/Living Environment   Role/Living Environment Comments Patient lives with her daughter and son in 3 story single " family home. 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 Mercy was unemployed prior to stroke   Pain   Patient currently in pain Yes   Pain location right side in general   Pain rating 3/10   Fall Risk Screen   Fall screen completed by OT   Have you fallen 2 or more times in the past year? No   Have you fallen and had an injury in the past year? No   Is patient a fall risk? Yes   Fall screen comments Will be having PT evaluation on MOnday   Abuse Screen (yes response referral indicated)   Feels Unsafe at Home or Work/School no   Feels Threatened by Someone no   Does Anyone Try to Keep You From Having Contact with Others or Doing Things Outside Your Home? no   Physical Signs of Abuse Present no   Patient needs abuse support services and resources No   Cognitive Status Examination   Cognitive Comment Patient's daughter states that Mercy is impulsive and a little confused since her stroke. Plan to assess cognition at follow up visits   Visual Perception   Visual Perception Comments Patient reports no deficits   Sensation   Sensation Comments Patient reports numbness on right side of body   Right Shoulder Flexion ROM   Right Shoulder Flexion AROM - degrees 0   Right Shoulder Extension ROM   Right Shoulder Extension AROM - degrees 15   Right Shoulder ABduction ROM   Right Shoulder ABduction AROM - degrees 54   Right Elbow Extension/Flexion ROM   Right Elbow Extension/Flexion AROM - degrees WNL/135   Right Forearm Supination ROM   Right Forearm Supination AROM - degrees 70   Right Forearm Pronation ROM   Right Forearm Pronation AROM - degrees WNL   Right Wrist Flexion ROM   Right Wrist Flexion AROM - degrees 50   Right Wrist Extension ROM   Right Wrist Extension AROM - degrees 24   MMT: Shoulder   Shoulder Flexion - Right Side (1+/5) trace plus, right   Shoulder Extension - Right Side (2/5) poor, right   Shoulder ABduction - Right Side (2+/5) poor plus, right   Shoulder ADduction -  Right Side (3/5) fair, right   MMT: Elbow/Forearm   Elbow Flexion - Right Side (3/5) fair, right   Elbow Extension - Right Side (3/5) fair, right   Forearm Supination - Right Side (3-/5) fair minus, right   Forearm Pronation - Right Side (3/5) fair, right   MMT:  Wrist   Wrist Flexion - Right Side (3/5) fair, right   Wrist Extension - Right Side (3-/5) fair minus, right   Hand Strength   Hand Dominance Right   Right Hand  (pounds) 1 pounds   Right Lateral Pinch (pounds) 0 pounds   Right Three Point Pinch (pounds) 0 pounds   Coordination   Coordination Comments Patient unable to perform coordination tests due to limited movement on right UE   Functional Mobility   Ambulation quad cane with CGA per daughters report   Wheelchair Patient is in the wheelchair much of the day   Transfer Skill   Level of [place]: Transfers minimum assist (75% patients effort)   Assistive Device quad cane   Bathing   Level of [place] - Bathing minimum assist (75% patients effort)   Assistive Device tub transfer bench   Upper Body Dressing   Level of [place]: Dress Upper Body minimum assist (75% patients effort)   Lower Body Dressing   Level of [place]: Dress Lower Body moderate assist (50% patients effort)   Toileting   Level of [place]: Toilet minimum assist (75% patients effort)   Grooming   Level of [place]: Grooming stand-by assist   Eating/Self-Feeding   Level of [place]: Eating minimum assist (75% patients effort)   Eating/Self Feeding Comments minimal assist for cutting up food   Instrumental Activities of Daily Living Assessment   IADL Assessment/Observations Patient's daughter is performing all the cooking, cleaning and laundry. She also manages the finances and medications for her mother.   Planned Therapy Interventions   Planned Therapy Interventions ADL training;Neuromuscular re-education;Strengthening;Stretching;ROM    OT Goal 1   Goal Description Patient to increase R shoulder flexion and  abduction AROM by 10 degrees for improved R UE function for ADL/IADLs (during dressing and grooming tasks, etc.).   Target Date 08/02/23    OT Goal 2   Goal Description Patient to demonstrate a R UE HEP(including NMES) with SBA and min cues for good follow through at home and increase functional strength and ROM for maximum independence with performance of ADLs.   Target Date 08/02/23    OT Goal 3   Goal Description Patient to demonstrate functional tasks (feeding self, wiping the counter/table, washing dishes, etc.) with 30% use of R UE as gross stabilizer or gross assist for increased ADL/IADL independence   Target Date 08/02/23   OT Goal 4   Goal Description Patient to demonstrate improved R   strength to 5# for increased ADL independence (hanging onto objects for hygiene, feeding self, completing B UE tasks, etc.).   Target Date 08/02/23   OT Goal 5   Goal Description Patient will demonstrate 3+/5 strength R elbow for increased independence with ADL tasks.   Target Date 08/02/23   Clinical Impression   Criteria for Skilled Therapeutic Interventions Met Yes, treatment indicated   OT Diagnosis right UE weakness, numbness, decreased ADL function, cognitive changes   Clinical Decision Making (Complexity) Low complexity   Therapy Frequency once a week   Predicted Duration of Therapy Intervention (days/wks) 12 weeks   Risks and Benefits of Treatment have been explained. Yes   Patient, Family & other staff in agreement with plan of care Yes   Clinical Impression Comments Patient has right UE weakness due to recent stroke and would benefit from OT for right UE NMRE and work on ADL independence   Education Assessment   Barriers To Learning Language   Therapy Certification   Certification date from 05/10/23   Certification date to 08/02/23   Certification I certify the need for these services furnished under this plan of treatment and while under my care.  (Physician co-signature of this document indicates review and  certification of the therapy plan)   Total Evaluation Time   OT Eval, Low Complexity Minutes (61868) 23

## 2023-05-16 NOTE — PROGRESS NOTES
[facility]      OUTPATIENT SPEECH LANGUAGE PATHOLOGY LANGUAGE COGNITION EVALUATION  PLAN OF TREATMENT FOR OUTPATIENT REHABILITATION  (COMPLETE FOR INITIAL CLAIMS ONLY)  Patient's Last Name, First Name, M.I.  YOB: 1970  Mercy Osman                           Provider s Name: [facility] Medical Record No.  [id number]     Onset Date:  4/8/2023    Start of Care Date:  05/09/23   Type:     ___PT  ___OT   _X_SLP    Medical Diagnosis:  I61.0 (ICD-10-CM) - Nontraumatic subcortical    Speech Language Pathology Diagnosis:   Moderate cognitive-linguistic deficits ; dysphagia    Visits from SOC: 1      _________________________________________________________________________________  Plan of Treatment/Functional Goals:  Planned Therapy Interventions:                  Cognitive Communication Goals  Goal Identifier: 1. Word Finding Strategies  Goal Description: Mercy will demonstrate use of 2-3 word finding strategies/circumlucation to support verbal expression with mild cues.  Target Date: 08/06/23    Goal Identifier: 2.  Swallowing Eval  Goal Description: Mercy will complete clinical swallow evaluation to assess swallow function and determine recommended swallow strategies and exercises.  Target Date: 08/06/23    Goal Identifier: 3. Verbal Expression  Goal Description: Mercy will complete moderate level verbal expression tasks such sentence generation, describing and expressing thoughts in structured conversational situations to improve verbal expression needed for daily communication and vocational needs with 90% accuracy and minimal assistance.  Target Date: 08/06/23    Goal Identifier: 4. Home Program  Goal Description: Mercy will verbalize understanding of a home programming recommendations each session to facilitate carryover from therapy session.  Target Date: 08/06/23              Therapy Frequency:   1x/week  Predicted Duration of Therapy Intervention:   3-6 months, pending progress    Nhi Mccormick, SLP         I CERTIFY THE NEED FOR THESE SERVICES FURNISHED UNDER        THIS PLAN OF TREATMENT AND WHILE UNDER MY CARE     (Physician co-signature of this document indicates review and certification of the therapy plan).                Certification Period:  05/09/23  to  08/06/23            Referring Physician:   Fabi Virgen PA-C    Initial Assessment        See Epic Evaluation Start of Care Date:   5/9/2023

## 2023-05-20 ENCOUNTER — HEALTH MAINTENANCE LETTER (OUTPATIENT)
Age: 53
End: 2023-05-20

## 2023-05-23 ENCOUNTER — THERAPY VISIT (OUTPATIENT)
Dept: PHYSICAL THERAPY | Facility: REHABILITATION | Age: 53
End: 2023-05-23
Payer: COMMERCIAL

## 2023-05-23 DIAGNOSIS — M62.81 GENERALIZED MUSCLE WEAKNESS: ICD-10-CM

## 2023-05-23 DIAGNOSIS — I61.0 NONTRAUMATIC SUBCORTICAL HEMORRHAGE OF LEFT CEREBRAL HEMISPHERE (H): Primary | ICD-10-CM

## 2023-05-23 DIAGNOSIS — R26.9 ABNORMAL GAIT: ICD-10-CM

## 2023-05-23 PROCEDURE — 97162 PT EVAL MOD COMPLEX 30 MIN: CPT | Mod: GP

## 2023-05-23 PROCEDURE — 97110 THERAPEUTIC EXERCISES: CPT | Mod: GP

## 2023-05-23 NOTE — PROGRESS NOTES
PHYSICAL THERAPY EVALUATION  Type of Visit: Evaluation    See electronic medical record for Abuse and Falls Screening details.    Subjective       Pt is a 53 yo F who presents to PT after a L CVA. PER CHART: Pt admitted on 4/8/23 with spontaneous acute L thalamic intracranial hemorrhage presenting with R hemiparesis, R facial droop, R hemisensory deficit, R visual neglect, dysarthria, aphasia, dysphagia, impaired cognition. Hospital course complicated by hyperlipidemia, prediabetes, elevated troponin, anemia, hypokalemia, and loose stools.  Pt admitted to ARU on 4/14/23 for multidisciplinary rehabilitation and ongoing medical management with d/c on 5/5/23. Pt states that her R side is affected, making it feel hard to do everything right now She needs help with transfers, gait, and cares at this time. She is ambulating around the main level of her home with a quad cane and her daughter providing SBA.     Presenting condition or subjective complaint:   Recent stroke   Date of onset: 04/07/23    Relevant medical history:   HTN, Savanna' palsy, hyperlipidemia, anemia, pre-diabetes, major depressive disorder         Prior therapy history for the same diagnosis, illness or injury:    While in ARU in April 2023    Prior Level of Function   Transfers: Independent  Ambulation: Independent  ADL: Independent     Living Environment  Social support:   Lives with family members, states that 1 daughter has been by her side through the entire process and is here today  Type of home:   2 story house   Stairs to enter the home:       3 without a railing   Ramp:   No   Stairs inside the home:       12 with a railing   Help at home:  Daughter   Equipment owned:   4 point cane and standard wheelchair     Employment:     No  Hobbies/Interests:  Gardening, walking at park, cooking, spending time with grandkids    Patient goals for therapy:    Strength in arm to use it, walk, work and doing the things that she needs to do.     Pain  assessment: No pain      Objective   Cognitive Status Examination  Level of Consciousness: Alert  Follows Commands and Answers Questions: 100% of the time, Follows multi step instructions  Personal Safety and Judgement: Intact    VITALS: Seated, resting, L UE pre-activity  HR: 80 bpm  BP: 129/87 mmHg    INTEGUMENTARY: Intact  RANGE OF MOTION: LE ROM WFL except R DF  STRENGTH:   Pain: - none + mild ++ moderate +++ severe  Strength Scale: 0-5/5 Left Right   Ankle Dorsiflexion 5 2+   Ankle Plantarflexion 5 3+     Pain: - none + mild ++ moderate +++ severe  Strength Scale: 0-5/5 Left Right   Hip Flexion 4+ 3+   Knee Flexion 5 4-   Knee Extension 5 4+     TRANSFERS: Contact Guard    WHEELCHAIR MOBILITY: Requires family member to push WC    GAIT:   Level of [place]: Contact Guard  Assistive Device(s): Cane (quad) DF assist   Gait Deviations: Stance time decreased  Stride length decreased  Marilou decreased    BALANCE: Decreased static and dynamic balance noted    SPECIAL TESTS  5 Times Sit-to-Stand (5TSTS)  31.96 seconds      Timed Up and Go (TUG) - sec 39.10 seconds      SENSATION: NT, will assess further as needed    REFLEXES: NT, will assess further as needed  COORDINATION: NT, will assess further as needed  MUSCLE TONE: NT, will assess further as needed    Assessment & Plan   CLINICAL IMPRESSIONS   Medical Diagnosis: Nontraumatic subcortical hemorrhage of left cerebral hemisphere    Treatment Diagnosis: Force production deficit   Impression/Assessment: Patient is a 52 year old female with recent L CVA.  The following significant findings have been identified: Decreased strength, Impaired balance, Impaired gait, Impaired muscle performance, Decreased activity tolerance and Instability. These impairments interfere with their ability to perform self care tasks, recreational activities, household chores, driving , household mobility and community mobility as compared to previous level of function. Pt would benefit  from skilled PT services to increase strength, weight bearing on R LE, improve gait speed to allow for home and community ambulation, balance training to decrease risk of falls in the future.      Clinical Decision Making (Complexity):   Clinical Presentation: Evolving/Changing  Clinical Presentation Rationale: based on medical and personal factors listed in PT evaluation  Clinical Decision Making (Complexity): Moderate complexity    PLAN OF CARE  Treatment Interventions:  Modalities: Per provider discretion  Interventions: Gait Training, Manual Therapy, Neuromuscular Re-education, Therapeutic Activity, Therapeutic Exercise, Orthotic Fitting/Training, Standardized Testing    Long Term Goals     PT Goal 1  Goal Identifier: HEP  Goal Description: Pt will be able to demonstrate HEP activities without need for cues from provider to demonstrate understanding and independence with HEP.  Target Date: 08/09/23  PT Goal 2  Goal Identifier: LE strength  Goal Description: Pt will demonstrate a 2.3 seconds improvement on 5xSTS to demonstrate minimum clinically significant difference in score to demonstrate improved LE strength  Goal Progress: 31.96s  Target Date: 08/09/23  PT Goal 3  Goal Identifier: Gait speed  Goal Description: Pt demonstrate a 0.12 second improvement in gait speed to demonstrate minimum clinically significant difference in score to demonstrate improved gait velocity  Target Date: 08/09/23  PT Goal 4  Goal Identifier: TUG  Goal Description: Pt will demonstrate a 6 second improvement on TUG to demonstrate a clinically significant difference in score to demonstrate improved decreased risk of falls, improved mobility, and balance  Goal Progress: 39.10s  Target Date: 08/09/23    Frequency of Treatment: 2x/week  Duration of Treatment: 12 weeks    Education Assessment:   Learner/Method: Patient;Family  Education Comments: edu on POC, HEP    Risks and benefits of evaluation/treatment have been explained.    Patient/Family/caregiver agrees with Plan of Care.     Evaluation Time:     PT Adriana, Moderate Complexity Minutes (21281): 35      Present: Yes: Language: Hmong, ID Number/Identifier: In person     Signing Clinician: Edelmira Cote, PT, DPT     [facility]                                                                                   OUTPATIENT PHYSICAL THERAPY      PLAN OF TREATMENT FOR OUTPATIENT REHABILITATION   Patient's Last Name, First Name, Mercy Addison    YOB: 1970   Provider's Name   [facility]   Medical Record No.  [id number]     Onset Date: 04/07/23  Start of Care Date: 05/24/23     Medical Diagnosis:  Nontraumatic subcortical hemorrhage of left cerebral hemisphere    PT Treatment Diagnosis:  Force production deficit Plan of Treatment  Frequency/Duration: 2x/week/ 12 weeks    Certification date from 05/24/23 to 08/21/23         See note for plan of treatment details and functional goals     Edelmira Cote, PT, DPT                         I CERTIFY THE NEED FOR THESE SERVICES FURNISHED UNDER        THIS PLAN OF TREATMENT AND WHILE UNDER MY CARE     (Physician attestation of this document indicates review and certification of the therapy plan).              Referring Provider: Fabi Virgen    Initial Assessment  See Epic Evaluation- Start of Care Date: 05/24/23

## 2023-05-26 NOTE — PROGRESS NOTES
__________________________________________________________    ___________________________________  ESTABLISHED PATIENT NEUROLOGY NOTE    DATE OF VISIT: 5/31/2023  MRN: [id number]  PATIENT NAME: Mercy Osman  YOB: 1970    Chief Complaint: Patient presents with:  Stroke: Patient reports her right arm is having pain for 2 weeks.    SUBJECTIVE:                                                      History of Present Illness: Mercy Osman is a 52 year old female presenting for follow-up forSpontaneous left thalamic intracranial hemorrhage, likely secondary to uncontrolled hypertension    She was hospitalized at [facility] on 04/08/23 - 04/14/23. Prior to the hospital stay, she had a past medical history of HTN (on PTA amlodipine-benazepril 10-20), history of prior Bell's Palsy.    She presented to the hospital with, Per chart review she presented 4/7/23 to [facility] ED via EMS after noted right sided weakness and facial droop. Last known well was 4/7 at 23:47. Per EHR review Ms. Osman was walking downstairs and fell due to sudden onset of right arm and leg weakness. She did not have obvious head trauma and she is not currently on anticoagulation. She was found to have a left basal ganglia hemorrhage. BP on arrival  To /115. Noted initially to be hypokalemic to 2.6, repleted. Started on a nicardipine gtt which has been off since 4/8.  Currently tolerating lisinopril and amlodipine (reintroduced 4/11) for pressure management.  There have been moderate improvements in RLE, but the RUE remains plegic.  She was deemed a good candidate for acute rehabilitation and is discharging in stable condition on 4/13/23.     Stroke Evaluation summarized:     MRI/Head CT  CT head 4/8/2023 12:31 AM-  1. 2.7 x 1.4 x 2.5 cm acute hemorrhage in the left thalamus/left internal capsule with involvement of the left basal ganglia. No intraventricular extension. This is typical of a hypertensive bleed.  2.  Slight left to right bowing of the midline structures.   CT head 4/8/2023 9:48 AM-  Stable intraparenchymal hemorrhage centered at the left  thalamus with associated mass effect upon the left lateral ventricle.  No new intracranial hemorrhage or other acute intracranial findings.  CT head 4/28/2023 9:09 PM-  Unchanged intraparenchymal hemorrhage centered in the left thalamus with associated mass effect. No new hemorrhage or acute intracranial finding.   Intracranial Vasculature HEAD CTA:  ANTERIOR CIRCULATION: Scattered atheromatous disease. No stenosis/occlusion, aneurysm, or high flow vascular malformation. Fetal origin of the right posterior cerebral artery from the anterior circulation.     POSTERIOR CIRCULATION: No stenosis/occlusion, aneurysm, or high flow vascular malformation. Balanced vertebral arteries supply a normal basilar artery.      DURAL VENOUS SINUSES: Not well evaluated on a technical basis   Cervical Vasculature NECK CTA:  RIGHT CAROTID: No measurable stenosis or dissection.     LEFT CAROTID: No measurable stenosis or dissection.     VERTEBRAL ARTERIES: No focal stenosis or dissection. Balanced vertebral arteries.     AORTIC ARCH: Classic aortic arch anatomy with no significant stenosis at the origin of the great vess      Echocardiogram Left ventricular function is decreased. The ejection fraction is 50-55%  (borderline). Mild to moderate concentric wall thickening consistent with left  ventricular hypertrophy is present.  Global right ventricular function is normal.  No significant valvular abnormalities present.  Estimated mean right atrial pressure is normal.  Ascending aorta dilated at 4.4 cm.  Trivial pericardial effusion is present.   EKG/Telemetry Sinus rhythm with sinus arrhythmia   Voltage criteria for left ventricular hypertrophy ( R in aVL , Sokolow-Jackson , Jose product )   ST & T wave abnormality, consider lateral ischemia   Prolonged QT   Abnormal ECG    Other Testing Not  Applicable       LDL  4/8/2023: 142 mg/dL   A1C  4/8/2023: 6.2 %   Troponin 4/11/2023: 88 ng/L    Impression   #Acute hemorrhage in the left thalamus/left internal capsule with involvement of the left basal ganglia. She presented to [facility] after she fell due to sudden onset of right-sided weakness    05/31/23: Mercy presents to the clinic for stroke follow-up.  She is accompanied by her daughter Kayli and .  They report that Kayli has right facial, arm, hand and leg numbness and weakness.  She feels her strength has improved 2 to 3% since her stroke.  She denies any vision changes.  She states that it is difficult for her to swallow solid foods that are bigger in size.  She continues to work with speech therapy once a week, occupational therapy once a week and physical therapy twice a week.  She is ambulating independently with a cane.  Her movements are slow and steady.    Blood pressure in clinic today was 111/75.  Last LDL was 142.  She was started on atorvastatin.  We discussed lifestyle modifications for A1c of 6.2%.  She will follow with her primary care provider on follow-up lab work and discussions on if they would like to medically manage her prediabetes.  No history of smoking or DAJA.    Mercy shares that she has a history of depression when her children were younger.  She feels that this is better controlled now that they are adults.  Since her stroke she feels her depression has remained stable.  She is not interested in therapy at this time.  No thoughts of self-harm.     Current Prevention Medications:    Amlodipine 5 mg tablet  Atorvastatin 20 mg tablet  Lisinopril 20 mg tablet    Perceived Side Effects: No    Medication Notes:   AED Medication Compliance:  compliant      Psycho-Social History: Mercy Osman currently lives [place] with son. Employment status: Not currently working/ not working prior to stroke    Patient does not smoke, no alcohol use, no recreational drug use.  We  reviewed importance of mental and emotional wellbeing and impact on health.      Current Medications:   acetaminophen (TYLENOL) 325 MG tablet, Take 2 tablets (650 mg) by mouth every 4 hours as needed for mild pain or fever  amLODIPine (NORVASC) 5 MG tablet, Take 1 tablet (5 mg) by mouth daily  atorvastatin (LIPITOR) 20 MG tablet, Take 1 tablet (20 mg) by mouth every evening  lisinopril (ZESTRIL) 20 MG tablet, Take 1 tablet (20 mg) by mouth daily  senna-docusate (SENOKOT-S/PERICOLACE) 8.6-50 MG tablet, Take 1-2 tablets by mouth nightly as needed for constipation    No current facility-administered medications on file prior to visit.    Past Medical History:   Patient  has no past medical history on file.  Surgical History:  She  has no past surgical history on file.  Family and Social History:  Reviewed, and she  reports that she has never smoked. She has never used smokeless tobacco.  Reviewed, and family history is not on file.    RECENT DIAGNOSTIC STUDIES:   Labs:    Coagulation studies:  Recent Labs   Lab Test 04/08/23  0032   INR 0.99        Lipid panel:  Recent Labs   Lab Test 04/08/23  0842   *       HbA1C:  Recent Labs   Lab Test 04/08/23  0842   A1C 6.2*       Troponin:  No lab results found.  No lab results found.  No lab results found.    Imaging: see HPI     REVIEW OF SYSTEMS:                                                      10-point review of systems is negative except as mentioned above in HPI.    EXAM:                                                      Physical Exam:   Vitals: /75   Pulse 78   BMI= There is no height or weight on file to calculate BMI.  GENERAL: NAD.  HEENT: NC/AT.  PULM: Non-labored breathing.   Neurologic:  MENTAL STATUS: Alert, attentive. Speech is fluent. Normal comprehension. Normal concentration. Adequate fund of knowledge.   CRANIAL NERVES: Visual fields intact to confrontation. Pupils equally, round and reactive to light. Mild facial droop. EOM full. Hearing  intact to conversation. Trapezius strength intact. Palate moves symmetrically. Tongue midline.  MOTOR: 5/5 in proximal and distal muscle groups of upper and lower extremities on left. Severe weakness on RUE.  R:1/5 L: 5/5  DTRs: Intact, brisk and symmetric in biceps, BR, triceps and patellae.   SENSATION: Normal light touch throughout.   COORDINATION: Normal finger nose finger on left, unable to perform on right. Finger tapping normal bilaterally. Knee heel shin normal bilaterally.  STATION AND GAIT:  Casual gait - left sided dominant with cane.  right hand-dominant.      ASSESSMENT and PLAN:                                                      Assessment:    ICD-10-CM    1. Nontraumatic subcortical hemorrhage of left cerebral hemisphere (H)  I61.0 Stroke Hospital Follow Up        Mercy Osman is a 52 year old female presenting for follow-up forSpontaneous left thalamic intracranial hemorrhage, likely secondary to uncontrolled hypertension. She was hospitalized at [facility] on 04/08/23 - 04/14/23. Prior to the hospital stay, she had a past medical history of HTN (on PTA amlodipine-benazepril 10-20), history of prior Bell's Palsy. She presented to the hospital  after noted right sided weakness and facial droop.  She continues to experience right-sided numbness and weakness.  She is working with PT/OT/speech.  She will follow with her primary care provider on her vascular risk factors.  We discussed interventions outlined below and plan to see the patient back in 6 months.  Mercy understands and agrees with this plan.    Plan:     Symptom management  - Continue pt/ot/speech therapies    Secondary prevention    Risk Factors   Elevated blood Pressure  BP: 111/75  - Goal <130/80  - Continue preventive therapy: Amlodipine and lisinopril as prescribed     Elevated cholesterol levels   LDL: 142  - Goal < 70 mg/dl  - Continue preventive therapy: Atorvastatin as prescribed     Diabetes   A1c: 6.2%  -  Goal: < 7.0%  - Continue preventive therapy: Lifestyle modifications.     --- Plan to follow-up with your primary care provider to manage your vascular risk factors  --- Plan on follow up in the Neurology Clinic in 6 months.  --- Please feel free to reach out if you have any further questions or concerns.  --- Seek immediate medical attention if an emergency arises or if your health becomes progressively worse.     For any acute neurologic deficits she was advised to  go directly to the hospital rather than call the clinic.    It was a pleasure to meet you today!     Total Time: Total time spent for face to face visit, reviewing labs/imaging studies, counseling and coordination of care was: 45 Minutes spent on the date of the encounter doing chart review, review of test results, patient visit, documentation, discussion with family and        This note was dictated using voice recognition software.  Any grammatical or context distortions are unintentional and inherent to the software.    Simran Turner, DNP, APRN, CNP  [facility]

## 2023-05-31 ENCOUNTER — OFFICE VISIT (OUTPATIENT)
Dept: NEUROLOGY | Facility: CLINIC | Age: 53
End: 2023-05-31
Attending: PSYCHIATRY & NEUROLOGY
Payer: COMMERCIAL

## 2023-05-31 VITALS — SYSTOLIC BLOOD PRESSURE: 111 MMHG | HEART RATE: 78 BPM | DIASTOLIC BLOOD PRESSURE: 75 MMHG

## 2023-05-31 DIAGNOSIS — I61.0 NONTRAUMATIC SUBCORTICAL HEMORRHAGE OF LEFT CEREBRAL HEMISPHERE (H): ICD-10-CM

## 2023-05-31 PROCEDURE — 99204 OFFICE O/P NEW MOD 45 MIN: CPT

## 2023-05-31 ASSESSMENT — PATIENT HEALTH QUESTIONNAIRE - PHQ9: SUM OF ALL RESPONSES TO PHQ QUESTIONS 1-9: 8

## 2023-05-31 NOTE — LETTER
5/31/2023         RE: Mercy Osman  [address] [id number]        Dear Colleague,    Thank you for referring your patient, Mercy Osman, to the [facility] [place]. Please see a copy of my visit note below.    __________________________________________________________    ___________________________________  ESTABLISHED PATIENT NEUROLOGY NOTE    DATE OF VISIT: 5/31/2023  MRN: [id number]  PATIENT NAME: Mercy Osman  YOB: 1970    Chief Complaint: Patient presents with:  Stroke: Patient reports her right arm is having pain for 2 weeks.    SUBJECTIVE:                                                      History of Present Illness: Mercy Osman is a 52 year old female presenting for follow-up forSpontaneous left thalamic intracranial hemorrhage, likely secondary to uncontrolled hypertension    She was hospitalized at [facility] on 04/08/23 - 04/14/23. Prior to the hospital stay, she had a past medical history of HTN (on PTA amlodipine-benazepril 10-20), history of prior Bell's Palsy.    She presented to the hospital with, Per chart review she presented 4/7/23 to [facility] ED via EMS after noted right sided weakness and facial droop. Last known well was 4/7 at 23:47. Per EHR review Ms. Osman was walking downstairs and fell due to sudden onset of right arm and leg weakness. She did not have obvious head trauma and she is not currently on anticoagulation. She was found to have a left basal ganglia hemorrhage. BP on arrival  To /115. Noted initially to be hypokalemic to 2.6, repleted. Started on a nicardipine gtt which has been off since 4/8.  Currently tolerating lisinopril and amlodipine (reintroduced 4/11) for pressure management.  There have been moderate improvements in RLE, but the RUE remains plegic.  She was deemed a good candidate for acute rehabilitation and is discharging in stable condition on 4/13/23.     Stroke Evaluation  summarized:     MRI/Head CT  CT head 4/8/2023 12:31 AM-  1. 2.7 x 1.4 x 2.5 cm acute hemorrhage in the left thalamus/left internal capsule with involvement of the left basal ganglia. No intraventricular extension. This is typical of a hypertensive bleed.  2. Slight left to right bowing of the midline structures.   CT head 4/8/2023 9:48 AM-  Stable intraparenchymal hemorrhage centered at the left  thalamus with associated mass effect upon the left lateral ventricle.  No new intracranial hemorrhage or other acute intracranial findings.  CT head 4/28/2023 9:09 PM-  Unchanged intraparenchymal hemorrhage centered in the left thalamus with associated mass effect. No new hemorrhage or acute intracranial finding.   Intracranial Vasculature HEAD CTA:  ANTERIOR CIRCULATION: Scattered atheromatous disease. No stenosis/occlusion, aneurysm, or high flow vascular malformation. Fetal origin of the right posterior cerebral artery from the anterior circulation.     POSTERIOR CIRCULATION: No stenosis/occlusion, aneurysm, or high flow vascular malformation. Balanced vertebral arteries supply a normal basilar artery.      DURAL VENOUS SINUSES: Not well evaluated on a technical basis   Cervical Vasculature NECK CTA:  RIGHT CAROTID: No measurable stenosis or dissection.     LEFT CAROTID: No measurable stenosis or dissection.     VERTEBRAL ARTERIES: No focal stenosis or dissection. Balanced vertebral arteries.     AORTIC ARCH: Classic aortic arch anatomy with no significant stenosis at the origin of the great vess      Echocardiogram Left ventricular function is decreased. The ejection fraction is 50-55%  (borderline). Mild to moderate concentric wall thickening consistent with left  ventricular hypertrophy is present.  Global right ventricular function is normal.  No significant valvular abnormalities present.  Estimated mean right atrial pressure is normal.  Ascending aorta dilated at 4.4 cm.  Trivial pericardial effusion is present.    EKG/Telemetry Sinus rhythm with sinus arrhythmia   Voltage criteria for left ventricular hypertrophy ( R in aVL , Sokolow-Jackson , [place] product )   ST & T wave abnormality, consider lateral ischemia   Prolonged QT   Abnormal ECG    Other Testing Not Applicable       LDL  4/8/2023: 142 mg/dL   A1C  4/8/2023: 6.2 %   Troponin 4/11/2023: 88 ng/L    Impression   #Acute hemorrhage in the left thalamus/left internal capsule with involvement of the left basal ganglia. She presented to [facility] after she fell due to sudden onset of right-sided weakness    05/31/23: Mercy presents to the clinic for stroke follow-up.  She is accompanied by her daughter Kayli and .  They report that Kayli has right facial, arm, hand and leg numbness and weakness.  She feels her strength has improved 2 to 3% since her stroke.  She denies any vision changes.  She states that it is difficult for her to swallow solid foods that are bigger in size.  She continues to work with speech therapy once a week, occupational therapy once a week and physical therapy twice a week.  She is ambulating independently with a cane.  Her movements are slow and steady.    Blood pressure in clinic today was 111/75.  Last LDL was 142.  She was started on atorvastatin.  We discussed lifestyle modifications for A1c of 6.2%.  She will follow with her primary care provider on follow-up lab work and discussions on if they would like to medically manage her prediabetes.  No history of smoking or DAJA.    Mercy shares that she has a history of depression when her children were younger.  She feels that this is better controlled now that they are adults.  Since her stroke she feels her depression has remained stable.  She is not interested in therapy at this time.  No thoughts of self-harm.     Current Prevention Medications:    Amlodipine 5 mg tablet  Atorvastatin 20 mg tablet  Lisinopril 20 mg tablet    Perceived Side Effects: No    Medication Notes:   AED  Medication Compliance:  compliant      Psycho-Social History: Mercy Osman currently lives [place] with son. Employment status: Not currently working/ not working prior to stroke    Patient does not smoke, no alcohol use, no recreational drug use.  We reviewed importance of mental and emotional wellbeing and impact on health.      Current Medications:   acetaminophen (TYLENOL) 325 MG tablet, Take 2 tablets (650 mg) by mouth every 4 hours as needed for mild pain or fever  amLODIPine (NORVASC) 5 MG tablet, Take 1 tablet (5 mg) by mouth daily  atorvastatin (LIPITOR) 20 MG tablet, Take 1 tablet (20 mg) by mouth every evening  lisinopril (ZESTRIL) 20 MG tablet, Take 1 tablet (20 mg) by mouth daily  senna-docusate (SENOKOT-S/PERICOLACE) 8.6-50 MG tablet, Take 1-2 tablets by mouth nightly as needed for constipation    No current facility-administered medications on file prior to visit.    Past Medical History:   Patient  has no past medical history on file.  Surgical History:  She  has no past surgical history on file.  Family and Social History:  Reviewed, and she  reports that she has never smoked. She has never used smokeless tobacco.  Reviewed, and family history is not on file.    RECENT DIAGNOSTIC STUDIES:   Labs:    Coagulation studies:  Recent Labs   Lab Test 04/08/23  0032   INR 0.99        Lipid panel:  Recent Labs   Lab Test 04/08/23  0842   *       HbA1C:  Recent Labs   Lab Test 04/08/23  0842   A1C 6.2*       Troponin:  No lab results found.  No lab results found.  No lab results found.    Imaging: see HPI     REVIEW OF SYSTEMS:                                                      10-point review of systems is negative except as mentioned above in HPI.    EXAM:                                                      Physical Exam:   Vitals: /75   Pulse 78   BMI= There is no height or weight on file to calculate BMI.  GENERAL: NAD.  HEENT: NC/AT.  PULM: Non-labored breathing.   Neurologic:  MENTAL  STATUS: Alert, attentive. Speech is fluent. Normal comprehension. Normal concentration. Adequate fund of knowledge.   CRANIAL NERVES: Visual fields intact to confrontation. Pupils equally, round and reactive to light. Mild facial droop. EOM full. Hearing intact to conversation. Trapezius strength intact. Palate moves symmetrically. Tongue midline.  MOTOR: 5/5 in proximal and distal muscle groups of upper and lower extremities on left. Severe weakness on RUE.  R:1/5 L: 5/5  DTRs: Intact, brisk and symmetric in biceps, BR, triceps and patellae.   SENSATION: Normal light touch throughout.   COORDINATION: Normal finger nose finger on left, unable to perform on right. Finger tapping normal bilaterally. Knee heel shin normal bilaterally.  STATION AND GAIT:  Casual gait - left sided dominant with cane.  right hand-dominant.      ASSESSMENT and PLAN:                                                      Assessment:    ICD-10-CM    1. Nontraumatic subcortical hemorrhage of left cerebral hemisphere (H)  I61.0 Stroke Hospital Follow Up        Mercy Osman is a 52 year old female presenting for follow-up forSpontaneous left thalamic intracranial hemorrhage, likely secondary to uncontrolled hypertension. She was hospitalized at [facility] on 04/08/23 - 04/14/23. Prior to the hospital stay, she had a past medical history of HTN (on PTA amlodipine-benazepril 10-20), history of prior Bell's Palsy. She presented to the hospital  after noted right sided weakness and facial droop.  She continues to experience right-sided numbness and weakness.  She is working with PT/OT/speech.  She will follow with her primary care provider on her vascular risk factors.  We discussed interventions outlined below and plan to see the patient back in 6 months.  Mercy understands and agrees with this plan.    Plan:     Symptom management  - Continue pt/ot/speech therapies    Secondary prevention    Risk Factors   Elevated blood  Pressure  BP: 111/75  - Goal <130/80  - Continue preventive therapy: Amlodipine and lisinopril as prescribed     Elevated cholesterol levels   LDL: 142  - Goal < 70 mg/dl  - Continue preventive therapy: Atorvastatin as prescribed     Diabetes   A1c: 6.2%  - Goal: < 7.0%  - Continue preventive therapy: Lifestyle modifications.     --- Plan to follow-up with your primary care provider to manage your vascular risk factors  --- Plan on follow up in the Neurology Clinic in 6 months.  --- Please feel free to reach out if you have any further questions or concerns.  --- Seek immediate medical attention if an emergency arises or if your health becomes progressively worse.     For any acute neurologic deficits she was advised to  go directly to the hospital rather than call the clinic.    It was a pleasure to meet you today!     Total Time: Total time spent for face to face visit, reviewing labs/imaging studies, counseling and coordination of care was: 45 Minutes spent on the date of the encounter doing chart review, review of test results, patient visit, documentation, discussion with family and        This note was dictated using voice recognition software.  Any grammatical or context distortions are unintentional and inherent to the software.    Simran Turner, JOEY, APRN, CNP  [facility]       Again, thank you for allowing me to participate in the care of your patient.        Sincerely,        HUMBLE Santos CNP

## 2023-05-31 NOTE — NURSING NOTE
Chief Complaint   Patient presents with     Stroke     Patient reports her right arm is having pain for 2 weeks.     Nida Law on 5/31/2023 at 8:20 AM

## 2023-06-07 ENCOUNTER — PATIENT OUTREACH (OUTPATIENT)
Dept: CARE COORDINATION | Facility: CLINIC | Age: 53
End: 2023-06-07
Payer: COMMERCIAL

## 2023-06-07 ENCOUNTER — APPOINTMENT (OUTPATIENT)
Dept: INTERPRETER SERVICES | Facility: CLINIC | Age: 53
End: 2023-06-07
Payer: COMMERCIAL

## 2023-06-07 ASSESSMENT — ACTIVITIES OF DAILY LIVING (ADL)
DEPENDENT_IADLS:: CLEANING;COOKING;LAUNDRY;SHOPPING;MEAL PREPARATION;MEDICATION MANAGEMENT;MONEY MANAGEMENT;TRANSPORTATION

## 2023-06-07 NOTE — PROGRESS NOTES
Stroke RN Care Coordination - Follow-Up Outreach Note     SITUATION     Mercy Osman is a 52 year old female who is receiving support for:  Clinic Care Coordination - Follow-up (Stroke RNCC Follow-Up)    BACKGROUND     Discharge summary 4/14/23: Ms. Mercy Osman is a 52 year old woman with a past medical history of HTN (on PTA amlodipine-benazepril 10-20), history of prior Bell's Palsy admitted on 04/08/23 as a transfer from [facility] for left intracranial hemorrhage.     Per chart review she presented 4/7/23 to [facility] ED via EMS after noted right sided weakness and facial droop. Last known well was 4/7 at 23:47. Per EHR review Ms. Osman was walking downstairs and fell due to sudden onset of right arm and leg weakness. She did not have obvious head trauma and she is not currently on anticoagulation. Stroke code activated upon arrival (see fellow note for full details). She was found to have a left basal ganglia hemorrhage. BP on arrival  To /115. Noted initially to be hypokalemic to 2.6, repleted. Started on a nicardipine gtt which has been off since 4/8.  Currently tolerating lisinopril and amlodipine (reintroduced 4/11) for pressure management.  There have been moderate improvements in RLE, but the RUE remains plegic.  She was deemed a good candidate for acute rehabilitation and is discharging in stable condition on 4/13/23    ASSESSMENT     Spoke with pt's daughter Kayli via [employer] . Pt was present at the time of the call per Kayli, but she was resting. Kayli reports that overall the pt is doing good at home. She has started outpatient therapies and continues to make small improvements in her R side strength. Kayli told me that the pt has started complaining of pain on her RUE and describes it as the numbness and tingling causing discomfort. I discussed with Kayli that nerve pain is common after stroke and that some may continue to improve, but there is a chance the pt has permanent  nerve damage from her stroke that may require medication for management. I encouraged Kayli to discuss the pain more with the pt's therapy providers and that they can reach out to her PCP should they want to trial medication like gabapentin. Kayli voiced understanding of this. She did not have any additional questions or concerns at the end of our call. RNCC will continue to follow with pt/family.     06/07/23 0901   Functional Status   Do you have any residual effects from the stroke Yes, I do   Weakness Yes   Weakness location R side   Numbness Yes   Numbness location R side (RUE worse)   Tingling Yes   Tingling location R side (RUE worse)   Fatigue Yes   Dizziness or Light-Headedness No   Memory Concerns recent   Other concerns: Language barrier   Dependent ADLs: Bathing;Dressing;Eating;Grooming;Ambulation-cane   Please indicate your level of independence in the following activities: Walking Need some help   Please indicate your level of independence in the following activities: Eating (including cutting food) Need some help   Please indicate your level of independence in the following activities: Going up and down stairs Unable   Please indicate your level of independence in the following activities: Bathing/showering including grooming Need some help   Please indicate your level of independence in the following activities: Using the toilet Need some help   Do you have bowel incontinence? No   Do you have bladder incontinence? No   Fallen 2 or more times in the past year? No   Any fall with injury in the past year? No   Resources and Support   Could you live alone without any help from another person? This means being able to bathe, use the toilet, shop, prepare or get meals, and manage finances No   Are you able to use electronics such as computers, tablets, and smartphones? No   Do you cook or do any house chores? No   Do you do your own shopping (including online shopping)? No   Do you manage your own  finances (balancing your checkbook, paying your bills, etc.)? No   What is your work status? Unemployed   What kind of work do you do (or did you do before the stroke)? Do not work outside the home   How do you get around? Family member drives me   What is your living situation right now? With family or significant other   Dependent IADLs: Cleaning;Cooking;Laundry;Shopping;Meal Preparation;Medication Management;Money Management;Transportation   Current living arrangement: I live in a private home with family   Type of residence: Private home - stairs   What was your living situation before the stroke? With family or significant other   Are you currently doing any physical therapy? Yes   How often 2x/wk   Are you currently doing any occupational therapy? Yes   How often 1x/wk   Are you currently doing any speech therapy? Yes   How often 1x/wk   Are you currently doing any other therapy? No   Do you get any home health services? No, but family helps a lot   [employer] DME   Mental health DX: Yes   Mental health DX how managed: None   Informal Support system: Children   Medications and Follow-up   How do you take your medications? Someone helps or supervises me when I take them   Do you sometimes miss or forget to take your medications? How often? Rarely   Medication adherence problem (GOAL): No   Effective medication organization strategy in place: Yes   Knowledgeable about how to use meds: Yes   Medication side effects suspected: No   Difficulty keeping appointments: No   Compliance Concerns No   No-Show Concerns No   No PCP office visit in Past Year No   Do you have the recommended follow-up scheduled? Yes   Risk Factor Management   Do you check your blood pressure at home? Yes   What are the blood pressure numbers that you usually get at home? SBP 110s-120s and DBP 70s-80s   Do you check your blood sugar at home? No   Is there any second-hand smoke at home? Yes   Stroke Care Coordination Outreach Frequency    Stroke Care Coordination Outreach Frequency Monthly     Stroke warning signs and symptoms - CALL 911 right away for:  - Sudden numbness or weakness in the face, arm or leg (often on one side of the body).  - Sudden confusion or trouble understanding what is going on.  - Sudden blurred or decreased vision in one or both eyes.  - Sudden trouble speaking, loss of balance, dizziness or problems with coordination.  - Sudden, severe headache for no reason.  - Fainting or seizures.  - Symptoms may go away then come back suddenly.    PLAN     Care Plan: Stroke       Problem: Stroke Risk Factors       Goal: Blood Pressure Management       Note:     6/7/23: BP well controlled and within recommended range.     Goal less then 140/90.   Lisinopril 20 mg  Amlodipine 5 mg  Checking daily at home.                 Goal: Hyperlipidemia Management       Note:     6/7/23: Compliant on lipitor. To follow up with PCP on rechecking at a later date.    LDL: 142.  Started Atorvastatin 20mg                Goal: Diabetes Management  Completed 6/7/2023      Note:     6/7/23: Following with PCP. Practicing diet and lifestyle modification.    A1C 6.2% on 04/08/2023. Not monitoring blood sugars at home.                 Goal: Nutrition  Completed 6/7/2023              Goal: Smoking Cessation  Completed 5/8/2023      Note:     Denies current tobacco use or second hand smoke exposure.                         Problem: Stroke Patient Support       Goal: Improve social support system       Note:     Lives with son, daughter in law, daughter                Goal: Improve Mental Health Support       Note:     6/7/23: Pt declining medication management and therapy at this time. Pt/family will continue to monitor and discuss concerns with PCP if they arise.    Plan to discuss at future scheduled follow up with Primary Care Provider on 05/12/2023 therapy versus medication.                 Goal: Improve Functional Status       Note:     6/7/23: Pt has  started OP therapies. Daughter reports slight improvement in functional status.    Outpatient ST/OT/PT.   Currently dependent with all ADLs/IADLs                        Problem: Stroke Care Follow-Up       Goal: PCP Follow-Up  Completed 5/12/2023      Note:     Scheduled Friday, May 12th at 10:55 am.                 Goal: Stroke Neurology Follow-Up  Completed 5/31/2023      Note:     Scheduled 05/31/2023 at 08:00 am                 Goal: Additional Specialty Follow-Up  Completed 6/7/2023      Note:     ST: 05/09/2023  OT: 05/10/2023  PT: 05/18/2023                            Follow-up plan:  Next planned outreach in 1 month. Pt's daughter has my direct contact information should she have questions or concerns prior to next outreach.    Rosita Koenig BS, RN, SCRN  RN Stroke Neurology Care Coordinator  [facility] Service Line

## 2023-06-08 ENCOUNTER — THERAPY VISIT (OUTPATIENT)
Dept: SPEECH THERAPY | Facility: REHABILITATION | Age: 53
End: 2023-06-08
Payer: COMMERCIAL

## 2023-06-08 DIAGNOSIS — R13.11 ORAL PHASE DYSPHAGIA: ICD-10-CM

## 2023-06-08 PROCEDURE — 92610 EVALUATE SWALLOWING FUNCTION: CPT | Mod: GN | Performed by: SPEECH-LANGUAGE PATHOLOGIST

## 2023-06-08 NOTE — PROGRESS NOTES
"SPEECH LANGUAGE PATHOLOGY EVALUATION    See electronic medical record for Abuse and Falls Screening details.    Subjective      Presenting condition or subjective complaint:  Dysphagia  Date of onset: 04/08/23    Relevant medical history:   Patient is a 52 year old female who presents to evaluation with dysphagia concerns.  Patient is currently being seen by speech therapy for cognitive-linguistic goals, with additional recommendations for swallowing evaluation.  Per initial evaluation on 5/9/23, \"Mercy is a 52-year-old female. Mercy was accompanied by her daughter and Hmong  to the evaluation; her primary language is Hmong. Mercy lives at home with her daughters and sons (total of 7 people in the home). Per hospital note in EMR on 4-14-23, \"past medical history of hypertension, Vici' palsy who was admitted on 4/8/23 with spontaneous acute left thalamic intracranial hemorrhage with hospital course complicated by hyperlipidemia, prediabetes, elevated troponin, anemia, hypokalemia, and loose stools.  She was admitted to ARU on 4/14/23 for multidisciplinary rehabilitation and ongoing medical management.       Per SLP Inpatient discharge note from 5/4/2023:  Continued therapy is recommended.  Rationale/Recommendations: Continue addressing higher-level language and cognition.  Patient has been on an easy to chew diet but due to patient and family preference for softer food textures.  Patient has been able to show significant improvement in her verbal expression and ability to complete word finding tasks. Suspect that some of the deficits were more due to cognition and reasoning which is also showing significant improvement over the course of rehab stay.  Patient would benefit from ongoing SLP intervention to continue maximizing her cognitive function and to decrease burden of care for IADL tasks .      Currently, Mercy reports difficulty with memory, word finding, and eating. Pt feels her speech has slowed down " "and is less fluent since her stroke. Patient and daughter report it can take Mercy a while to find the word she wants to use and sometimes she uses words that do not make sense with the rest of the sentence; daughter reports Mercy does not realize when she uses a word that does not make sense and is unrelated to the rest of the sentence or conversation. Patient s daughter reports the words Mercy does use are easy to understand.       Regarding swallowing, patient reports she has numbness in her mouth/throat. Mercy's daughter reports Mercy will cough when eating if the meal is not prepared correctly. She notes Mercy has a hard time chewing her food. Mercy also reports she is unable to taste her food. Daughter has tried cooking with different flavors and Mercy was not able to taste the difference. Daughter also notes Mercy frequently pockets her food.\" noted.  Dates & types of surgery:      Prior diagnostic imaging/testing results:     None reported  Prior therapy history for the same diagnosis, illness or injury:    No    Living Environment  Social support:   Family  Help at home:  family    Employment:    homemaker    Patient goals for therapy:  Find ways to improve swallow/chewing    Pain assessment: Pain not reported     Objective     SWALLOW EVALUTION  Dysphagia history: See above  Current Diet/Method of Nutritional Intake: thin liquids (level 0), soft & bite-sized (level 6)        CLINICAL SWALLOW EVALUATION  Oral Motor Function: anomalies present  Dentition: adequate dentition  Oral labial function: impaired retraction, impaired pursing, impaired seal, impaired strength on left  Lingual function: impaired left lateral movement, impaired strength on left, impaired coordination     Level of assist required for feeding: set up only required   Textures Trialed:   Clinical Swallow Eval: Thin Liquids  Mode of presentation: cup - patient prefers warm water only  Volume presented: 4oz  Preparatory Phase: WFL  Oral Phase: " WFL  Pharyngeal phase of swallow: intact , multiple swallows- large bolus  Strategies trialed during procedure: ANN MARIE SLP CLINICAL EVAL STRATEGIES: NA   Diagnostic statement: No s/s aspiration.  Large bolus accepted requiring multiple swallows.  Reports of anterior loss of liquid/solid bolus on right side per daughter.    Clinical Swallow Eval: Purees  Mode of presentation: self-fed   Volume presented: 1oz  Preparatory Phase: anterior loss of bolus, poor bolus control  Oral Phase: impaired AP movement  Pharyngeal phase of swallow: intact, repeated swallows   Strategies trialed during procedure: ANN MARIE SLP CLINICAL EVAL STRATEGIES: reduced bolus size   Diagnostic statement: Patient with no s/s aspiration.  Multiple swallows required with full spoonful- noted facial grimace but patient denied pain.  Improved timeliness and comfort reported with 1/3 spoonful.    Clinical Swallow Eval: Soft & Bite Sized  Mode of presentation: self-fed   Volume presented: x2 bites of soft fruit  Preparatory Phase: prolonged bolus preparation, insufficient mastication, poor bolus control  Oral Phase: impaired AP movement  Pharyngeal phase of swallow: intact   Strategies trialed during procedure: ANN MARIE SLP CLINICAL EVAL STRATEGIES: NA   Diagnostic statement: No s/s aspiration.  Slow mastication primarily in anterior and left side of mouth.  Adequate oral clearance; however, suspect with larger quantity this would be more challenging.     ADDITIONAL EVAL COMPLETED TODAY: No    ESOPHAGEAL PHASE OF SWALLOW  no observed or reported concerns related to esophageal function     SWALLOW ASSESSMENT CLINICAL IMPRESSIONS AND RATIONALE  Diet Consistency Recommendations: thin liquids (level 0), soft & bite-sized (level 6)    Recommended Feeding/Eating Techniques: assistance needed for feeding, small bolus size, place food on left side of mouth, slow rate of intake, alternate food and liquid intake, check mouth for oral residue/pocketing   Medication  Administration Recommendations: Pills in puree  Instrumental Assessment Recommendations: instrumental evaluation not recommended at this time     Assessment & Plan   CLINICAL IMPRESSIONS   Medical Diagnosis: I61.0 (ICD-10-CM) - Nontraumatic subcortical hemorrhage of left cerebral hemisphere (H)    Treatment Diagnosis: Oral Dysphagia   Impression/Assessment: Pt is a 52 year old female with chewing/swallowing complaints. The following significant findings have been identified: impaired swallowing, characterized by prolonged mastication, oral motor weakness, reduced bolus manipulation, and increased risk for buccal pocketing. Identified deficits interfere with their ability to consume an oral diet and maintain nutrition as compared to previous level of function.    PLAN OF CARE  Treatment Interventions:  Swallowing dysfunction and/or oral function for feeding    Prognosis to achieve stated therapy goals is fair   Rehab potential is impacted by: current level of function, family/caregiver support, patient awareness of deficits, pending medical intervention, prior level of function    Long Term Goals   SLP Goal 1  Goal Identifier: Swallow 1  Goal Description: Patient will consume snack portion of soft and bite size foods with timely mastication, efficient mastication, and complete oral clearance.  Rationale: To maximize safety, ease and/or independence of oral intake  Target Date: 09/08/23  SLP Goal 2  Goal Identifier: Swallow 2  Goal Description: Patient will complete x3 lingual/labial exercises to trial improved strength, coordination, and ROM to support oral phase of swallow.  Rationale: To maximize safety, ease and/or independence of oral intake  Target Date: 09/08/23  SLP Goal 3  Goal Identifier: Swallow 3  Goal Description: Patient will verbalize understanding of intake strategies to increase safety with oral intake by end of POC.  Rationale: To maximize safety, ease and/or independence of oral intake  Target Date:  09/08/23      Frequency of Treatment: 1x per week  Duration of Treatment: 3 months     Recommended Referrals to Other Professionals: NA  Education Assessment:   Learner/Method: Patient;Family  Education Comments: Oral phase dysphagia    Risks and benefits of evaluation/treatment have been explained.   Patient/Family/caregiver agrees with Plan of Care.     Evaluation Time:     SLP Eval: oral/pharyngeal swallow function, clinical minutes (67822): 45    Signing Clinician: ALLEN Albright        [facility]                                                                                   OUTPATIENT SPEECH LANGUAGE PATHOLOGY      PLAN OF TREATMENT FOR OUTPATIENT REHABILITATION   Patient's Last Name, First Name, MABEL OsmanMercy    YOB: 1970   Provider's Name   [facility]   Medical Record No.  [id number]     Onset Date: 04/08/23 Start of Care Date: 06/08/23     Medical Diagnosis:  I61.0 (ICD-10-CM) - Nontraumatic subcortical hemorrhage of left cerebral hemisphere (H)      SLP Treatment Diagnosis: Oral Dysphagia  Plan of Treatment  Frequency/Duration: 1x per week  / 3 months     Certification date from 06/08/23   To 09/08/23 (8/6/23 for cognitive linguistic POC)          See note for plan of treatment details and functional goals     Madeline Baltazar, SLP                         I CERTIFY THE NEED FOR THESE SERVICES FURNISHED UNDER        THIS PLAN OF TREATMENT AND WHILE UNDER MY CARE     (Physician attestation of this document indicates review and certification of the therapy plan).                  Referring Provider:  Ara Osman      Initial Assessment  See Epic Evaluation- 06/08/23

## 2023-06-13 ENCOUNTER — THERAPY VISIT (OUTPATIENT)
Dept: SPEECH THERAPY | Facility: REHABILITATION | Age: 53
End: 2023-06-13
Payer: COMMERCIAL

## 2023-06-13 DIAGNOSIS — R13.11 ORAL PHASE DYSPHAGIA: Primary | ICD-10-CM

## 2023-06-13 PROCEDURE — 92526 ORAL FUNCTION THERAPY: CPT | Mod: GN | Performed by: SPEECH-LANGUAGE PATHOLOGIST

## 2023-06-19 ENCOUNTER — THERAPY VISIT (OUTPATIENT)
Dept: OCCUPATIONAL THERAPY | Facility: REHABILITATION | Age: 53
End: 2023-06-19
Payer: COMMERCIAL

## 2023-06-19 DIAGNOSIS — I61.0 NONTRAUMATIC SUBCORTICAL HEMORRHAGE OF LEFT CEREBRAL HEMISPHERE (H): ICD-10-CM

## 2023-06-19 DIAGNOSIS — G81.91 RIGHT HEMIPARESIS (H): Primary | ICD-10-CM

## 2023-06-19 DIAGNOSIS — Z78.9 DECREASED ACTIVITIES OF DAILY LIVING (ADL): ICD-10-CM

## 2023-06-19 PROCEDURE — 97112 NEUROMUSCULAR REEDUCATION: CPT | Mod: GO | Performed by: OCCUPATIONAL THERAPIST

## 2023-06-20 ENCOUNTER — THERAPY VISIT (OUTPATIENT)
Dept: SPEECH THERAPY | Facility: REHABILITATION | Age: 53
End: 2023-06-20
Payer: COMMERCIAL

## 2023-06-20 DIAGNOSIS — R47.01 APHASIA: ICD-10-CM

## 2023-06-20 DIAGNOSIS — R13.11 ORAL PHASE DYSPHAGIA: Primary | ICD-10-CM

## 2023-06-20 PROCEDURE — 92526 ORAL FUNCTION THERAPY: CPT | Mod: GN | Performed by: SPEECH-LANGUAGE PATHOLOGIST

## 2023-06-26 ENCOUNTER — THERAPY VISIT (OUTPATIENT)
Dept: OCCUPATIONAL THERAPY | Facility: REHABILITATION | Age: 53
End: 2023-06-26
Payer: COMMERCIAL

## 2023-06-26 DIAGNOSIS — I61.0 NONTRAUMATIC SUBCORTICAL HEMORRHAGE OF LEFT CEREBRAL HEMISPHERE (H): ICD-10-CM

## 2023-06-26 DIAGNOSIS — Z78.9 DECREASED ACTIVITIES OF DAILY LIVING (ADL): ICD-10-CM

## 2023-06-26 DIAGNOSIS — G81.91 RIGHT HEMIPARESIS (H): Primary | ICD-10-CM

## 2023-06-26 PROCEDURE — 97112 NEUROMUSCULAR REEDUCATION: CPT | Mod: GO | Performed by: OCCUPATIONAL THERAPIST

## 2023-06-29 ENCOUNTER — THERAPY VISIT (OUTPATIENT)
Dept: SPEECH THERAPY | Facility: REHABILITATION | Age: 53
End: 2023-06-29
Payer: COMMERCIAL

## 2023-06-29 DIAGNOSIS — R47.01 APHASIA: ICD-10-CM

## 2023-06-29 DIAGNOSIS — R13.11 ORAL PHASE DYSPHAGIA: Primary | ICD-10-CM

## 2023-06-29 PROCEDURE — 92507 TX SP LANG VOICE COMM INDIV: CPT | Mod: GN | Performed by: SPEECH-LANGUAGE PATHOLOGIST

## 2023-07-03 ENCOUNTER — THERAPY VISIT (OUTPATIENT)
Dept: OCCUPATIONAL THERAPY | Facility: REHABILITATION | Age: 53
End: 2023-07-03
Payer: COMMERCIAL

## 2023-07-03 DIAGNOSIS — Z78.9 DECREASED ACTIVITIES OF DAILY LIVING (ADL): ICD-10-CM

## 2023-07-03 DIAGNOSIS — G81.91 RIGHT HEMIPARESIS (H): Primary | ICD-10-CM

## 2023-07-03 DIAGNOSIS — I61.0 NONTRAUMATIC SUBCORTICAL HEMORRHAGE OF LEFT CEREBRAL HEMISPHERE (H): ICD-10-CM

## 2023-07-03 PROCEDURE — 97112 NEUROMUSCULAR REEDUCATION: CPT | Mod: GO | Performed by: OCCUPATIONAL THERAPIST

## 2023-07-06 ENCOUNTER — THERAPY VISIT (OUTPATIENT)
Dept: SPEECH THERAPY | Facility: REHABILITATION | Age: 53
End: 2023-07-06
Payer: COMMERCIAL

## 2023-07-06 DIAGNOSIS — R13.11 ORAL PHASE DYSPHAGIA: Primary | ICD-10-CM

## 2023-07-06 DIAGNOSIS — R47.01 APHASIA: ICD-10-CM

## 2023-07-06 PROCEDURE — 92507 TX SP LANG VOICE COMM INDIV: CPT | Mod: GN | Performed by: SPEECH-LANGUAGE PATHOLOGIST

## 2023-07-10 ENCOUNTER — THERAPY VISIT (OUTPATIENT)
Dept: OCCUPATIONAL THERAPY | Facility: REHABILITATION | Age: 53
End: 2023-07-10
Payer: COMMERCIAL

## 2023-07-10 DIAGNOSIS — I61.0 NONTRAUMATIC SUBCORTICAL HEMORRHAGE OF LEFT CEREBRAL HEMISPHERE (H): ICD-10-CM

## 2023-07-10 DIAGNOSIS — Z78.9 DECREASED ACTIVITIES OF DAILY LIVING (ADL): ICD-10-CM

## 2023-07-10 DIAGNOSIS — G81.91 RIGHT HEMIPARESIS (H): Primary | ICD-10-CM

## 2023-07-10 PROCEDURE — 97112 NEUROMUSCULAR REEDUCATION: CPT | Mod: GO | Performed by: OCCUPATIONAL THERAPIST

## 2023-07-11 ENCOUNTER — PATIENT OUTREACH (OUTPATIENT)
Dept: CARE COORDINATION | Facility: CLINIC | Age: 53
End: 2023-07-11
Payer: COMMERCIAL

## 2023-07-11 ENCOUNTER — APPOINTMENT (OUTPATIENT)
Dept: INTERPRETER SERVICES | Facility: CLINIC | Age: 53
End: 2023-07-11
Payer: COMMERCIAL

## 2023-07-11 NOTE — PROGRESS NOTES
Stroke RN Care Coordination - Follow-Up Outreach Note     SITUATION     Mercy Osman is a 52 year old female who is receiving support for:  Clinic Care Coordination - Follow-up (Stroke RNCC Follow-Up)    BACKGROUND     Discharge summary 4/14/23: Ms. Mercy Osman is a 52 year old woman with a past medical history of HTN (on PTA amlodipine-benazepril 10-20), history of prior Bell's Palsy admitted on 04/08/23 as a transfer from [facility] for left intracranial hemorrhage.     Per chart review she presented 4/7/23 to [facility] ED via EMS after noted right sided weakness and facial droop. Last known well was 4/7 at 23:47. Per EHR review Ms. Osman was walking downstairs and fell due to sudden onset of right arm and leg weakness. She did not have obvious head trauma and she is not currently on anticoagulation. Stroke code activated upon arrival (see fellow note for full details). She was found to have a left basal ganglia hemorrhage. BP on arrival  To /115. Noted initially to be hypokalemic to 2.6, repleted. Started on a nicardipine gtt which has been off since 4/8.  Currently tolerating lisinopril and amlodipine (reintroduced 4/11) for pressure management.  There have been moderate improvements in RLE, but the RUE remains plegic.  She was deemed a good candidate for acute rehabilitation and is discharging in stable condition on 4/13/23    ASSESSMENT     Spoke with pt's daughter Kayli today to follow up on how her mother has been doing since my last outreach. She reports that the pt is working with therapies and making functional improvements. There were scheduling limitations with ongoing PT appts, but she reports pt is scheduled this week for resumption of PT. They have follow up scheduled with PCP for tomorrow and otherwise did not have any lingering questions or concerns. Ensured that Kayli has my contact information and encouraged her to reach out as needed.     07/11/23 1137   Functional Status   Do  you have any residual effects from the stroke Yes, I do   Weakness Yes   Weakness location R side   Numbness Yes   Numbness location R side (RUE improved from last outreach)   Tingling Yes   Tingling location R side   Fatigue Sometimes   Dizziness or Light-Headedness No   Memory Concerns recent   Other concerns: Language barrier   Dependent ADLs: Ambulation-cane;Dressing;Bathing;Eating;Grooming;Toileting   Please indicate your level of independence in the following activities: Walking Need some help   Please indicate your level of independence in the following activities: Eating (including cutting food) Need some help   Please indicate your level of independence in the following activities: Going up and down stairs Unable   Please indicate your level of independence in the following activities: Dressing and undressing (including shoes) Need some help   Please indicate your level of independence in the following activities: Bathing/showering including grooming Need some help   Please indicate your level of independence in the following activities: Using the toilet Need some help   Do you have bowel incontinence? No   Do you have bladder incontinence? No   Fallen 2 or more times in the past year? No   Any fall with injury in the past year? No   Resources and Support   Could you live alone without any help from another person? This means being able to bathe, use the toilet, shop, prepare or get meals, and manage finances No   Are you able to use electronics such as computers, tablets, and smartphones? No   Do you cook or do any house chores? No   Do you do your own shopping (including online shopping)? No   Do you manage your own finances (balancing your checkbook, paying your bills, etc.)? No   What is your work status? Unemployed   What kind of work do you do (or did you do before the stroke)? Do not work outside the home   How do you get around? Family member drives me   What is your living situation right now? With  family or significant other   Dependent IADLs: Cleaning;Cooking;Laundry;Shopping;Meal Preparation;Medication Management;Money Management;Transportation   Current living arrangement: I live in a private home with family   Type of residence: Private home - stairs   What was your living situation before the stroke? With family or significant other   Are you currently doing any physical therapy? Yes   How often expected 1x/wk, but appt availbility is variable per daughter   Are you currently doing any occupational therapy? Yes   How often 1x/wk   Are you currently doing any speech therapy? Yes   How often 1x/wk   Are you currently doing any other therapy? No   Do you get any home health services? No, but family helps a lot   [employer] DME   Mental health DX: Yes   Mental health DX how managed: None   Informal Support system: Children   Medications and Follow-up   How do you take your medications? Someone helps or supervises me when I take them   Do you sometimes miss or forget to take your medications? How often? Rarely   Medication adherence problem (GOAL): No   Effective medication organization strategy in place: Yes   Knowledgeable about how to use meds: Yes   Medication side effects suspected: No   Difficulty keeping appointments: No   Compliance Concerns No   No-Show Concerns No   No PCP office visit in Past Year No   Do you have the recommended follow-up scheduled? Yes   Risk Factor Management   Do you check your blood pressure at home? Yes   What are the blood pressure numbers that you usually get at home? WNLs, occasionally low so daughter will hold BP med x1 dose   Do you check your blood sugar at home? No   Is there any second-hand smoke at home? Yes   Stroke Care Coordination Outreach Frequency   Stroke Care Coordination Outreach Frequency 2 Months     Stroke warning signs and symptoms - CALL 911 right away for:  - Sudden numbness or weakness in the face, arm or leg (often on one side of the  body).  - Sudden confusion or trouble understanding what is going on.  - Sudden blurred or decreased vision in one or both eyes.  - Sudden trouble speaking, loss of balance, dizziness or problems with coordination.  - Sudden, severe headache for no reason.  - Fainting or seizures.  - Symptoms may go away then come back suddenly.    PLAN     Care Plan: Stroke       Problem: Stroke Risk Factors       Goal: Blood Pressure Management       Note:     7/11: BP well controlled on current meds.    Goal less then 140/90.   Lisinopril 20 mg  Amlodipine 5 mg                  Goal: Hyperlipidemia Management       Note:     7/11: Compliant on lipitor. Has PCP follow up with labs tomorrow 7/12.    LDL: 142.  Started Atorvastatin 20mg                Goal: Diabetes Management  Completed 6/7/2023      Note:     6/7/23: Following with PCP. Practicing diet and lifestyle modification.    A1C 6.2% on 04/08/2023. Not monitoring blood sugars at home.                 Goal: Nutrition  Completed 6/7/2023              Goal: Smoking Cessation  Completed 5/8/2023      Note:     Denies current tobacco use or second hand smoke exposure.                         Problem: Stroke Patient Support       Goal: Improve social support system  Completed 7/11/2023      Note:     No additional social support needs at this time.                Goal: Improve Mental Health Support  Completed 7/11/2023      Note:     Stable. Pt declining medication management at this time. Will discuss with PCP if there is additional needs in the future.                Goal: Improve Functional Status       Note:     7/11: Participating in OP therapies weekly. Functional status improving per daughter's report.    Outpatient ST/OT/PT.   Currently dependent with all ADLs/IADLs                        Problem: Stroke Care Follow-Up       Goal: PCP Follow-Up  Completed 5/12/2023      Note:     Scheduled Friday, May 12th at 10:55 am.                 Goal: Stroke Neurology Follow-Up   Completed 5/31/2023      Note:     Scheduled 05/31/2023 at 08:00 am                 Goal: Additional Specialty Follow-Up  Completed 6/7/2023      Note:     ST: 05/09/2023  OT: 05/10/2023  PT: 05/18/2023                              Follow-up plan:  Discussed transitioning to maintenance outreach given pt's stability. Kayli in agreement with this plan, so next planned outreach in 2 months.    Rosita Koenig BS, RN, SCRN  RN Stroke Neurology Care Coordinator  [facility] Line

## 2023-07-13 ENCOUNTER — THERAPY VISIT (OUTPATIENT)
Dept: PHYSICAL THERAPY | Facility: REHABILITATION | Age: 53
End: 2023-07-13
Payer: COMMERCIAL

## 2023-07-13 DIAGNOSIS — I61.0 NONTRAUMATIC SUBCORTICAL HEMORRHAGE OF LEFT CEREBRAL HEMISPHERE (H): ICD-10-CM

## 2023-07-13 DIAGNOSIS — M62.81 GENERALIZED MUSCLE WEAKNESS: Primary | ICD-10-CM

## 2023-07-13 DIAGNOSIS — R26.9 ABNORMAL GAIT: ICD-10-CM

## 2023-07-13 PROCEDURE — 97110 THERAPEUTIC EXERCISES: CPT | Mod: CQ | Performed by: PHYSICAL THERAPY ASSISTANT

## 2023-07-13 PROCEDURE — 97112 NEUROMUSCULAR REEDUCATION: CPT | Mod: CQ | Performed by: PHYSICAL THERAPY ASSISTANT

## 2023-07-17 ENCOUNTER — THERAPY VISIT (OUTPATIENT)
Dept: OCCUPATIONAL THERAPY | Facility: REHABILITATION | Age: 53
End: 2023-07-17
Payer: COMMERCIAL

## 2023-07-17 DIAGNOSIS — I61.0 NONTRAUMATIC SUBCORTICAL HEMORRHAGE OF LEFT CEREBRAL HEMISPHERE (H): ICD-10-CM

## 2023-07-17 DIAGNOSIS — G81.91 RIGHT HEMIPARESIS (H): Primary | ICD-10-CM

## 2023-07-17 DIAGNOSIS — Z78.9 DECREASED ACTIVITIES OF DAILY LIVING (ADL): ICD-10-CM

## 2023-07-17 PROCEDURE — 97112 NEUROMUSCULAR REEDUCATION: CPT | Mod: GO | Performed by: OCCUPATIONAL THERAPIST

## 2023-07-18 ENCOUNTER — THERAPY VISIT (OUTPATIENT)
Dept: PHYSICAL THERAPY | Facility: REHABILITATION | Age: 53
End: 2023-07-18
Payer: COMMERCIAL

## 2023-07-18 DIAGNOSIS — R26.9 ABNORMAL GAIT: ICD-10-CM

## 2023-07-18 DIAGNOSIS — M62.81 GENERALIZED MUSCLE WEAKNESS: Primary | ICD-10-CM

## 2023-07-18 DIAGNOSIS — I61.0 NONTRAUMATIC SUBCORTICAL HEMORRHAGE OF LEFT CEREBRAL HEMISPHERE (H): ICD-10-CM

## 2023-07-18 PROCEDURE — 97110 THERAPEUTIC EXERCISES: CPT | Mod: CQ | Performed by: PHYSICAL THERAPY ASSISTANT

## 2023-07-18 PROCEDURE — 97112 NEUROMUSCULAR REEDUCATION: CPT | Mod: CQ | Performed by: PHYSICAL THERAPY ASSISTANT

## 2023-07-24 ENCOUNTER — THERAPY VISIT (OUTPATIENT)
Dept: OCCUPATIONAL THERAPY | Facility: REHABILITATION | Age: 53
End: 2023-07-24
Payer: COMMERCIAL

## 2023-07-24 DIAGNOSIS — I61.0 NONTRAUMATIC SUBCORTICAL HEMORRHAGE OF LEFT CEREBRAL HEMISPHERE (H): ICD-10-CM

## 2023-07-24 DIAGNOSIS — Z78.9 DECREASED ACTIVITIES OF DAILY LIVING (ADL): ICD-10-CM

## 2023-07-24 DIAGNOSIS — G81.91 RIGHT HEMIPARESIS (H): Primary | ICD-10-CM

## 2023-07-24 PROCEDURE — 97112 NEUROMUSCULAR REEDUCATION: CPT | Mod: GO | Performed by: OCCUPATIONAL THERAPIST

## 2023-07-25 ENCOUNTER — THERAPY VISIT (OUTPATIENT)
Dept: SPEECH THERAPY | Facility: REHABILITATION | Age: 53
End: 2023-07-25
Payer: COMMERCIAL

## 2023-07-25 DIAGNOSIS — R13.11 ORAL PHASE DYSPHAGIA: Primary | ICD-10-CM

## 2023-07-25 PROCEDURE — 92526 ORAL FUNCTION THERAPY: CPT | Mod: GN | Performed by: SPEECH-LANGUAGE PATHOLOGIST

## 2023-07-25 PROCEDURE — 92507 TX SP LANG VOICE COMM INDIV: CPT | Mod: GN | Performed by: SPEECH-LANGUAGE PATHOLOGIST

## 2023-08-08 ENCOUNTER — THERAPY VISIT (OUTPATIENT)
Dept: PHYSICAL THERAPY | Facility: REHABILITATION | Age: 53
End: 2023-08-08
Payer: COMMERCIAL

## 2023-08-08 DIAGNOSIS — M62.81 GENERALIZED MUSCLE WEAKNESS: Primary | ICD-10-CM

## 2023-08-08 DIAGNOSIS — R26.9 ABNORMAL GAIT: ICD-10-CM

## 2023-08-08 DIAGNOSIS — I61.0 NONTRAUMATIC SUBCORTICAL HEMORRHAGE OF LEFT CEREBRAL HEMISPHERE (H): ICD-10-CM

## 2023-08-08 PROCEDURE — 97110 THERAPEUTIC EXERCISES: CPT | Mod: GP | Performed by: PHYSICAL THERAPY ASSISTANT

## 2023-08-08 PROCEDURE — 97112 NEUROMUSCULAR REEDUCATION: CPT | Mod: GP | Performed by: PHYSICAL THERAPY ASSISTANT

## 2023-08-12 ENCOUNTER — HEALTH MAINTENANCE LETTER (OUTPATIENT)
Age: 53
End: 2023-08-12

## 2023-08-30 ENCOUNTER — THERAPY VISIT (OUTPATIENT)
Dept: PHYSICAL THERAPY | Facility: REHABILITATION | Age: 53
End: 2023-08-30
Payer: COMMERCIAL

## 2023-08-30 ENCOUNTER — THERAPY VISIT (OUTPATIENT)
Dept: OCCUPATIONAL THERAPY | Facility: REHABILITATION | Age: 53
End: 2023-08-30
Payer: COMMERCIAL

## 2023-08-30 DIAGNOSIS — M62.81 GENERALIZED MUSCLE WEAKNESS: Primary | ICD-10-CM

## 2023-08-30 DIAGNOSIS — I61.0 NONTRAUMATIC SUBCORTICAL HEMORRHAGE OF LEFT CEREBRAL HEMISPHERE (H): ICD-10-CM

## 2023-08-30 DIAGNOSIS — G81.91 RIGHT HEMIPARESIS (H): Primary | ICD-10-CM

## 2023-08-30 DIAGNOSIS — Z78.9 DECREASED ACTIVITIES OF DAILY LIVING (ADL): ICD-10-CM

## 2023-08-30 DIAGNOSIS — R26.9 ABNORMAL GAIT: ICD-10-CM

## 2023-08-30 PROCEDURE — 97112 NEUROMUSCULAR REEDUCATION: CPT | Mod: GP | Performed by: PHYSICAL THERAPIST

## 2023-08-30 PROCEDURE — 97535 SELF CARE MNGMENT TRAINING: CPT | Mod: GO | Performed by: OCCUPATIONAL THERAPIST

## 2023-08-30 PROCEDURE — 97112 NEUROMUSCULAR REEDUCATION: CPT | Mod: GO | Performed by: OCCUPATIONAL THERAPIST

## 2023-08-30 PROCEDURE — 97110 THERAPEUTIC EXERCISES: CPT | Mod: GP | Performed by: PHYSICAL THERAPIST

## 2023-08-30 NOTE — PROGRESS NOTES
[facility]                                                                                   OUTPATIENT OCCUPATIONAL THERAPY    PLAN OF TREATMENT FOR OUTPATIENT REHABILITATION   Patient's Last Name, First Name, Mercy Addison    YOB: 1970   Provider's Name   [facility]   Medical Record No.  [id number]     Onset Date: 04/07/23 Start of Care Date: 05/10/23     Medical Diagnosis:  Nontraumatic subcortical hemorrhage of left cerebral hemisphere (H) (I61.0)      OT Treatment Diagnosis:  right UE weakness, numbness, decreased ADL function, cognitive changes Plan of Treatment  Frequency/Duration:once a week/12 weeks    Certification date from 08/02/23   To 10/25/23        See note for plan of treatment details and functional goals     Yady Ceron OT                         I CERTIFY THE NEED FOR THESE SERVICES FURNISHED UNDER        THIS PLAN OF TREATMENT AND WHILE UNDER MY CARE .             Physician Signature               Date    X_____________________________________________________                    Referring Provider:  Ara Osman      Initial Assessment  See Epic Evaluation- 05/10/23                   08/30/23 0500   Appointment Info   Treating Provider Carline Ceron OTR/L   Visits Used 7   Medical Diagnosis Nontraumatic subcortical hemorrhage of left cerebral hemisphere (H) (I61.0)   OT Tx Diagnosis right UE weakness, numbness, decreased ADL function, cognitive changes   Progress Note/Certification   Start Of Care Date 05/10/23   Onset of Illness/Injury or Date of Surgery 04/07/23   Therapy Frequency once a week   Predicted Duration 12 weeks   Certification date from 08/02/23   Certification date to 10/25/23   KX Modifier Statement I certify the need for these services furnished under this plan of treatment and while under my care.  (Physician co-signature of this document indicates review and certification of the therapy  plan)   Progress Note Due Date 10/25/23   Progress Note Completed Date 08/30/23   Recertification Due 10/25/23       Present Yes   Preferred Language HMONG    ID Patient requested that her daughter interpret today.   OT Goal 1   Goal Description Patient to increase R shoulder flexion and abduction AROM by 10 degrees for improved R UE function for ADL/IADLs (during dressing and grooming tasks, etc.).   Goal Progress met-continue goal   Target Date 10/25/23   OT Goal 2   Goal Description Patient to demonstrate a R UE HEP(including NMES) with SBA and min cues for good follow through at home and increase functional strength and ROM for maximum independence with performance of ADLs.   Target Date 10/25/23   Goal Progress met-continue goal   OT Goal 3   Goal Description Patient to demonstrate functional tasks (feeding self, wiping the counter/table, washing dishes, etc.) with 30% use of R UE as gross stabilizer or gross assist for increased ADL/IADL independence   Target Date 10/25/23   Goal Progress making progress-continue goal   OT Goal 4   Goal Description Patient to demonstrate improved R   strength to 5# for increased ADL independence (hanging onto objects for hygiene, feeding self, completing B UE tasks, etc.).   Target Date 10/25/23   Goal Progress met-continue goal   OT Goal 5   Goal Description Patient will demonstrate 3+/5 strength R elbow for increased independence with ADL tasks.   Target Date 10/25/23   Date Met 08/30/23   Subjective Report   Subjective Report Patient reports that right shoulder pain is absent the past week   Objective Measure 1   Objective Measure Right POG   Details 8#   Objective Measure 2   Objective Measure Right shoulder flexion/extension-AROM/strength   Details 94/44, 3-/3   Objective Measure 3   Objective Measure Right abduction/adduction-AROM/strength   Details 84/WNL, 3-/3   Objective Measure 4   Objective Measure Right elbow  extension/flexion-AROM/strength   Details WNL/144, 3/3-   Objective Measure 5   Objective Measure Right pronation/supination-AROM   Details 75/WNL   Objective Measure 6   Objective Measure Right wrist flexion/extension-AROM/strength   Details 81/47, 3/3-   Objective Measure 7   Objective Measure Right 3 point pinch/lateral pinch   Details 1#/4#   Self Care/Home Management   Self-Care/Home Mgmt/ADL, Compensatory, Meal Prep Minutes (41175) 10 Minutes   Skilled Intervention AE   Treatment Detail Patient has increased right  strength and reports that has been trying to use right hand to feed self.   Neuromuscular Re-education   Neuromuscular Re-ed Minutes (59703) 30   Skilled Intervention Right UE AAROM, AROM and strengthening   Patient Response/Progress patient is highly engaged and motivated   Treatment Detail New measurements taken today and patient has made significant improvement in all areas.Patient is using her NMES machine at home 3 times a day to for shoulder subluxation, shoulder flexion, abductionm elbow flexion/extension. Patient also performing HEP including arm slides on table top.  Reviewed HEP to including AROM shoulder flexion/extesnion, elbow flexion/extension. She will continue using dorsal hand weight for wrist flexion, wrist extension, pronation and supination. (changing placement of hand weight for each exercise). Patient continue to use pink(soft) theraputty. Patient has tight left finger extensors and was re-instructed on PROM for this.   Education   Learner/Method Patient;Family   Readiness Acceptance   Plan   Plan for next session continue PROM to finger extensors.continue coordination and finger exercises(add finger abd/add). add internal and external rotation and abduction. Begin functional UE ex's(UBE, dowel, folding towels. peg board etc)   Comments   Comments .   Total Session Time   Timed Code Treatment Minutes 40   Total Treatment Time (sum of timed and untimed services) 40

## 2023-08-30 NOTE — PATIENT INSTRUCTIONS
Practice walking with crossing your foot in front of your other foot and then uncross and then cross it behind your other foot and uncross - at a counter top to hold on for balance      Practice raising your toes up    X10-20 reps 1-2x/day

## 2023-08-30 NOTE — PROGRESS NOTES
[facility]                                                                                   OUTPATIENT PHYSICAL THERAPY    PLAN OF TREATMENT FOR OUTPATIENT REHABILITATION   Patient's Last Name, First Name, Mercy Addison    YOB: 1970   Provider's Name   [facility]   Medical Record No.  [id number]     Onset Date: 04/07/23  Start of Care Date: 05/24/23     Medical Diagnosis:  Nontraumatic subcortical hemorrhage of left cerebral hemisphere      PT Treatment Diagnosis:  Force production deficit Plan of Treatment  Frequency/Duration: 2x/week/ 12 weeks    Certification date from 08/21/23 to 11/18/23         See note for plan of treatment details and functional goals     Mia Soares PT                         I CERTIFY THE NEED FOR THESE SERVICES FURNISHED UNDER        THIS PLAN OF TREATMENT AND WHILE UNDER MY CARE     (Physician attestation of this document indicates review and certification of the therapy plan).                Referring Provider:  Fabi Virgen      Initial Assessment  See Epic Evaluation- Start of Care Date: 05/24/23 08/30/23 0500   Appointment Info   Signing clinician's name / credentials Mia Soares PT, DPT, CLT   Visits Used 5   Medical Diagnosis Nontraumatic subcortical hemorrhage of left cerebral hemisphere   PT Tx Diagnosis Force production deficit   Quick Adds Certification   Progress Note/Certification   Start of Care Date 05/24/23   Onset of illness/injury or Date of Surgery 04/07/23   Therapy Frequency 2x/week   Predicted Duration 12 weeks   Certification date from 08/21/23   Certification date to 11/18/23   Progress Note Due Date 08/21/23   Progress Note Completed Date 05/24/23   Supervision   PT Assistant Visit Number 3      Preferred Language ON[place]    [email or web address]   GOALS   PT Goals 2;3;4   PT Goal 1   Goal Identifier HEP   Goal Description Pt will  be able to demonstrate HEP activities without need for cues from provider to demonstrate understanding and independence with HEP.   Goal Progress IMPROVING - pt reports compliance with HEP and with practicing walking - been able to go to the park   Target Date 11/18/23   PT Goal 2   Goal Identifier LE strength   Goal Description Pt will demonstrate a 2.3 seconds improvement on 5xSTS to demonstrate minimum clinically significant difference in score to demonstrate improved LE strength   Goal Progress 16 seconds today (was 32 sec)   Target Date 11/18/23   PT Goal 3   Goal Identifier Gait speed   Goal Description Pt demonstrate a 0.12 second improvement in gait speed to demonstrate minimum clinically significant difference in score to demonstrate improved gait velocity   Goal Progress 0.63 m/s   Target Date 11/18/23   PT Goal 4   Goal Identifier TUG   Goal Description Pt will demonstrate a 6 second improvement on TUG to demonstrate a clinically significant difference in score to demonstrate improved decreased risk of falls, improved mobility, and balance   Goal Progress 16.5 sec average today (was 39 sec)   Target Date 11/18/23   Subjective Report   Subjective Report Pt reports no falls over the past 3 weeks. Pt's family member reports that she has gone for a couple of walks in the park 2x/week and pt used to take about 2 hours to do that but the last time it only took her about 45 minutes.   Objective Measures   Objective Measures Objective Measure 1;Objective Measure 2;Objective Measure 3;Objective Measure 4;Objective Measure 5   Objective Measure 1   Objective Measure TUG   Details 17 and 16 seconds today (was 39 s)   Objective Measure 2   Objective Measure 5 STS   Details 16 seconds today (was 32 s)   Objective Measure 3   Objective Measure 2 minute walk test for speed   Details Hallway 12.6 meters 6 laps in 2 minutes - 75.6 m total = 0.63 meters per second   Objective Measure 4   Objective Measure R LE strength    Details R hip flexor 4+/5 (was 3+/5), quad 5-/5 (was 4+/5), DF 5-/5 (was 2+/5)   Objective Measure 5   Objective Measure Balance   Details SLS L 30 seconds R 17 seconds   Treatment Interventions (PT)   Interventions Therapeutic Procedure/Exercise;Gait Training   Therapeutic Procedure/Exercise   Therapeutic Procedures: strength, endurance, ROM, flexibillity minutes (89164) 24   Ther Proc 1 - Details Performed R LE strength testing, performed 2 minute walk test. Reviewed HEP -  walking programming, STS, standing marches, standing hip AB, standing hip extension, bridges, seated hip abd with resistance band, performed heel raises x20 reps, performed toe raises x20 reps.   Patient Response/Progress Pt did well exercises, could tell new exercise will help   Skilled Intervention Assessed response to HEP and activity level since last visit 3 weeks ago. Reassessed B LE strength and endurance/gait speed with 2 minute walk test and discussed improvements. Reviewed entire HEP and performed and added further standing LE strengthening to HEP for LE weakness per pt instructions and printed AVS for home. Cues for safety with exercise.   Neuromuscular Re-education   Neuromuscular re-ed of mvmt, balance, coord, kinesthetic sense, posture, proprioception minutes (27789) 30   Skilled Intervention Reassessed balance performance and discussed great improvements. Reviewed HEP and performed and added further balance training to home program per pt instructions and printed AVS for home.   Patient Response/Progress Pt challenged with carioca walking at counter needing significant verbal and tactile cueing for performance but no LOB - pt and pt's family member demo understanding of technique for home program   Neuro Re-ed 1 - Details Performed TUG, 5RSTS, SLS and discussed results. Performed B LE SLS x5 reps each and discussed double stance on pillow and reviewed for home program. Performed at counter top side stepping x8' x6 reps, front  cross overs x8' x6 reps, back cross overs x8' x6 reps, carioca B x8' x10 reps with significant cues - added to home program   Education   Learner/Method Patient;Family   Education Comments edu on POC, HEP   Plan   Home program STS, standing marches, standing hip abduction and hip extension, heel raises, toe raises, bridges, SLS, standing on pillow, resisted hip abd seated with band, carioca walking at countertop, walking around the house/park   Plan for next session Recommend continued PT services for B LE strengthening, gait training, balance and functional mobility for 1x every 1-3 weeks for up to 90 more days as needed to be I with HEP and goals met. Pt remains appropriate candidate for skilled PT services to address remaining impairments and reach goals.   Comments   Comments Pt returns today for her  follow up appointment with 3 weeks of HEP. Pt's daughter attended the entire session.  Pt demonstrates significant improved TUG and 5 STS today as well as R LE strength testing results. Pt has been compliant with her HEP and walks daily with her quad cane. Pt is independent wiht her transfers needing stand by assist. Pt remains appropriate to continue physical therapy.   Total Session Time   Timed Code Treatment Minutes 54   Total Treatment Time (sum of timed and untimed services) 54                Arm band on/IV intact/Admission wristband placed

## 2023-09-06 ENCOUNTER — THERAPY VISIT (OUTPATIENT)
Dept: PHYSICAL THERAPY | Facility: REHABILITATION | Age: 53
End: 2023-09-06
Payer: COMMERCIAL

## 2023-09-06 DIAGNOSIS — I61.0 NONTRAUMATIC SUBCORTICAL HEMORRHAGE OF LEFT CEREBRAL HEMISPHERE (H): ICD-10-CM

## 2023-09-06 DIAGNOSIS — M62.81 GENERALIZED MUSCLE WEAKNESS: Primary | ICD-10-CM

## 2023-09-06 DIAGNOSIS — R26.9 ABNORMAL GAIT: ICD-10-CM

## 2023-09-06 PROCEDURE — 97110 THERAPEUTIC EXERCISES: CPT | Mod: GP

## 2023-09-08 ENCOUNTER — PATIENT OUTREACH (OUTPATIENT)
Dept: CARE COORDINATION | Facility: CLINIC | Age: 53
End: 2023-09-08
Payer: COMMERCIAL

## 2023-09-08 NOTE — PROGRESS NOTES
Stroke RN Care Coordination - Unable to Reach / Voicemail Note     Stroke RN Care Coordinator Outreach:  Clinic Care Coordination - Follow-up (Stroke RNCC Follow-Up)     Outreach attempted x 1 to pt's daughter Kayli.      Unable to leave voicemail due to the mailbox being full.    Stroke RN Care Coordinator will try to reach daughter again in 1-2 business days.    Rosita Koenig BS, RN, SCRN  RN Stroke Neurology Care Coordinator  [facility] Neuroscience Service Line

## 2023-09-08 NOTE — LETTER
September 14, 2023      Mercy Jacqui  [address] [address]             Dear Mercy,    I have not been able to reach you since our last contact. At this time, I will stop trying to reach you. This does not change any of your scheduled visits. You will still be able to receive care from your doctors.      All of us with the stroke care coordination team are invested in your health. We are here to assist you in meeting your goals. If you need support from a stroke care coordinator in the future, please call Rosita at [phone].     Sincerely,     Rosita Koenig BS, RN, SCRN  RN Stroke Neurology Care Coordinator  [facility] Service Line

## 2023-09-12 NOTE — PROGRESS NOTES
Stroke RN Care Coordination - Unable to Reach / Voicemail Note     Stroke RN Care Coordinator Outreach:  Clinic Care Coordination - Follow-up (Stroke RNCC Follow-Up)     Outreach attempted x 2.      Left message on  daughter's  voicemail with call back information and requested return call.    Stroke RN Care Coordinator will try to reach daughter again in 1-2 business days.    Rosita Koenig BS, RN, SCRN  RN Stroke Neurology Care Coordinator  [facility] Neuroscience Service Line

## 2023-09-13 ENCOUNTER — THERAPY VISIT (OUTPATIENT)
Dept: OCCUPATIONAL THERAPY | Facility: REHABILITATION | Age: 53
End: 2023-09-13
Payer: COMMERCIAL

## 2023-09-13 ENCOUNTER — THERAPY VISIT (OUTPATIENT)
Dept: PHYSICAL THERAPY | Facility: REHABILITATION | Age: 53
End: 2023-09-13
Payer: COMMERCIAL

## 2023-09-13 DIAGNOSIS — G81.91 RIGHT HEMIPARESIS (H): Primary | ICD-10-CM

## 2023-09-13 DIAGNOSIS — R26.9 ABNORMAL GAIT: ICD-10-CM

## 2023-09-13 DIAGNOSIS — M62.81 GENERALIZED MUSCLE WEAKNESS: Primary | ICD-10-CM

## 2023-09-13 DIAGNOSIS — Z78.9 DECREASED ACTIVITIES OF DAILY LIVING (ADL): ICD-10-CM

## 2023-09-13 DIAGNOSIS — I61.0 NONTRAUMATIC SUBCORTICAL HEMORRHAGE OF LEFT CEREBRAL HEMISPHERE (H): ICD-10-CM

## 2023-09-13 PROCEDURE — 97110 THERAPEUTIC EXERCISES: CPT | Mod: CQ | Performed by: PHYSICAL THERAPY ASSISTANT

## 2023-09-13 PROCEDURE — 97140 MANUAL THERAPY 1/> REGIONS: CPT | Mod: GO | Performed by: OCCUPATIONAL THERAPIST

## 2023-09-13 PROCEDURE — 97112 NEUROMUSCULAR REEDUCATION: CPT | Mod: CQ | Performed by: PHYSICAL THERAPY ASSISTANT

## 2023-09-13 PROCEDURE — 97112 NEUROMUSCULAR REEDUCATION: CPT | Mod: GO | Performed by: OCCUPATIONAL THERAPIST

## 2023-09-27 ENCOUNTER — THERAPY VISIT (OUTPATIENT)
Dept: OCCUPATIONAL THERAPY | Facility: REHABILITATION | Age: 53
End: 2023-09-27
Payer: COMMERCIAL

## 2023-09-27 DIAGNOSIS — Z78.9 DECREASED ACTIVITIES OF DAILY LIVING (ADL): ICD-10-CM

## 2023-09-27 DIAGNOSIS — G81.91 RIGHT HEMIPARESIS (H): Primary | ICD-10-CM

## 2023-09-27 DIAGNOSIS — I61.0 NONTRAUMATIC SUBCORTICAL HEMORRHAGE OF LEFT CEREBRAL HEMISPHERE (H): ICD-10-CM

## 2023-09-27 PROCEDURE — 97110 THERAPEUTIC EXERCISES: CPT | Mod: GO | Performed by: OCCUPATIONAL THERAPIST

## 2023-10-09 ENCOUNTER — THERAPY VISIT (OUTPATIENT)
Dept: OCCUPATIONAL THERAPY | Facility: REHABILITATION | Age: 53
End: 2023-10-09
Payer: COMMERCIAL

## 2023-10-09 DIAGNOSIS — G81.91 RIGHT HEMIPARESIS (H): Primary | ICD-10-CM

## 2023-10-09 DIAGNOSIS — Z78.9 DECREASED ACTIVITIES OF DAILY LIVING (ADL): ICD-10-CM

## 2023-10-09 DIAGNOSIS — I61.0 NONTRAUMATIC SUBCORTICAL HEMORRHAGE OF LEFT CEREBRAL HEMISPHERE (H): ICD-10-CM

## 2023-10-09 PROCEDURE — 97110 THERAPEUTIC EXERCISES: CPT | Mod: GO | Performed by: OCCUPATIONAL THERAPIST

## 2023-10-16 ENCOUNTER — THERAPY VISIT (OUTPATIENT)
Dept: PHYSICAL THERAPY | Facility: REHABILITATION | Age: 53
End: 2023-10-16
Payer: COMMERCIAL

## 2023-10-16 ENCOUNTER — THERAPY VISIT (OUTPATIENT)
Dept: OCCUPATIONAL THERAPY | Facility: REHABILITATION | Age: 53
End: 2023-10-16
Payer: COMMERCIAL

## 2023-10-16 DIAGNOSIS — I61.0 NONTRAUMATIC SUBCORTICAL HEMORRHAGE OF LEFT CEREBRAL HEMISPHERE (H): ICD-10-CM

## 2023-10-16 DIAGNOSIS — R26.9 ABNORMAL GAIT: ICD-10-CM

## 2023-10-16 DIAGNOSIS — Z78.9 DECREASED ACTIVITIES OF DAILY LIVING (ADL): ICD-10-CM

## 2023-10-16 DIAGNOSIS — M62.81 GENERALIZED MUSCLE WEAKNESS: Primary | ICD-10-CM

## 2023-10-16 DIAGNOSIS — G81.91 RIGHT HEMIPARESIS (H): Primary | ICD-10-CM

## 2023-10-16 PROCEDURE — 97110 THERAPEUTIC EXERCISES: CPT | Mod: GO | Performed by: OCCUPATIONAL THERAPIST

## 2023-10-16 PROCEDURE — 97110 THERAPEUTIC EXERCISES: CPT | Mod: GP | Performed by: PHYSICAL THERAPY ASSISTANT

## 2023-10-16 PROCEDURE — 97112 NEUROMUSCULAR REEDUCATION: CPT | Mod: GP | Performed by: PHYSICAL THERAPY ASSISTANT

## 2023-10-26 ENCOUNTER — THERAPY VISIT (OUTPATIENT)
Dept: PHYSICAL THERAPY | Facility: REHABILITATION | Age: 53
End: 2023-10-26
Payer: COMMERCIAL

## 2023-10-26 DIAGNOSIS — M62.81 GENERALIZED MUSCLE WEAKNESS: Primary | ICD-10-CM

## 2023-10-26 DIAGNOSIS — I61.0 NONTRAUMATIC SUBCORTICAL HEMORRHAGE OF LEFT CEREBRAL HEMISPHERE (H): ICD-10-CM

## 2023-10-26 DIAGNOSIS — R26.9 ABNORMAL GAIT: ICD-10-CM

## 2023-10-26 PROCEDURE — 97112 NEUROMUSCULAR REEDUCATION: CPT | Mod: GP | Performed by: PHYSICAL THERAPY ASSISTANT

## 2023-10-26 PROCEDURE — 97110 THERAPEUTIC EXERCISES: CPT | Mod: GP | Performed by: PHYSICAL THERAPY ASSISTANT

## 2023-10-27 ENCOUNTER — THERAPY VISIT (OUTPATIENT)
Dept: OCCUPATIONAL THERAPY | Facility: REHABILITATION | Age: 53
End: 2023-10-27
Payer: COMMERCIAL

## 2023-10-27 DIAGNOSIS — G81.91 RIGHT HEMIPARESIS (H): Primary | ICD-10-CM

## 2023-10-27 DIAGNOSIS — Z78.9 DECREASED ACTIVITIES OF DAILY LIVING (ADL): ICD-10-CM

## 2023-10-27 DIAGNOSIS — I61.0 NONTRAUMATIC SUBCORTICAL HEMORRHAGE OF LEFT CEREBRAL HEMISPHERE (H): ICD-10-CM

## 2023-10-27 PROCEDURE — 97110 THERAPEUTIC EXERCISES: CPT | Mod: GO | Performed by: OCCUPATIONAL THERAPIST

## 2023-10-27 NOTE — PROGRESS NOTES
[facility]                                                                                   OUTPATIENT OCCUPATIONAL THERAPY    PLAN OF TREATMENT FOR OUTPATIENT REHABILITATION   Patient's Last Name, First Name, Mercy Addison    YOB: 1970   Provider's Name   [facility]   Medical Record No.  [id number]     Onset Date: 04/07/23 Start of Care Date: 05/10/23     Medical Diagnosis:  Nontraumatic subcortical hemorrhage of left cerebral hemisphere (H) (I61.0)      OT Treatment Diagnosis:  right UE weakness, numbness, decreased ADL function, cognitive changes Plan of Treatment  Frequency/Duration:once a week/12 weeks    Certification date from 10/25/23   To 01/17/24        See note for plan of treatment details and functional goals     Yady Ceron OT                         I CERTIFY THE NEED FOR THESE SERVICES FURNISHED UNDER        THIS PLAN OF TREATMENT AND WHILE UNDER MY CARE .             Physician Signature               Date    X_____________________________________________________                    Referring Provider:  Ara Osman MD      Initial Assessment  See Epic Evaluation- 05/10/23       10/27/23 0500   Appointment Info   Treating Provider Carline Ceron OTR/L   Visits Used 12   Medical Diagnosis Nontraumatic subcortical hemorrhage of left cerebral hemisphere (H) (I61.0)   OT Tx Diagnosis right UE weakness, numbness, decreased ADL function, cognitive changes   Progress Note/Certification   Start Of Care Date 05/10/23   Onset of Illness/Injury or Date of Surgery 04/07/23   Therapy Frequency once a week   Predicted Duration 12 weeks   Certification date from 10/25/23   Certification date to 01/17/24   KX Modifier Statement I certify the need for these services furnished under this plan of treatment and while under my care.  (Physician co-signature of this document indicates review and certification of the therapy plan)    Progress Note Due Date 01/17/24   Progress Note Completed Date 10/27/23   Recertification Due 01/17/24       Present Yes   Preferred Language [facility]    ID FV-ID [id number]   OT Goal 1   Goal Description Patient to increase R shoulder flexion and abduction AROM by 10 degrees for improved R UE function for ADL/IADLs (during dressing and grooming tasks, etc.).   Goal Progress met-continue goal   Target Date 01/17/24   OT Goal 2   Goal Description Patient to demonstrate a R UE HEP(including NMES) with SBA and min cues for good follow through at home and increase functional strength and ROM for maximum independence with performance of ADLs.   Goal Progress met-continue goal   Target Date 01/17/24   OT Goal 3   Goal Description Patient to demonstrate functional tasks (feeding self, wiping the counter/table, washing dishes, etc.) with 30% use of R UE as gross stabilizer or gross assist for increased ADL/IADL independence   Goal Progress making progress-continue goal   Target Date 01/17/24   OT Goal 4   Goal Description Patient to demonstrate improved R   strength to 5# for increased ADL independence (hanging onto objects for hygiene, feeding self, completing B UE tasks, etc.).   Goal Progress met-continue goal   Target Date 01/17/24   OT Goal 5   Goal Description Patient will demonstrate 3+/5 strength R elbow for increased independence with ADL tasks.   Target Date 10/25/23   Date Met 08/30/23   Objective Measure 1   Objective Measure Right POG   Details 15#   Objective Measure 2   Objective Measure Right shoulder flexion/extension-AROM/strength   Details 130/WNL, 3+/3+   Objective Measure 3   Objective Measure Right abduction/adduction-AROM/strength   Details 115/WNL, 3-/3   Objective Measure 4   Objective Measure Right elbow extension/flexion-AROM/strength   Details WNL/153, 3+/3   Objective Measure 5   Objective Measure Right pronation/supination-AROM   Details WNL/WNL,   Objective  Measure 6   Objective Measure Right wrist flexion/extension-AROM/strength   Details WNL/WNL, 3+/3+   Objective Measure 7   Objective Measure Right 3 point pinch/lateral pinch   Details 5#/5#   Therapeutic Procedure/Exercise   Therapeutic Procedure: strength, endurance, ROM, flexibillity minutes (12715) 40   Skilled Intervention Right UE AAROM, AROM and strengthening   Patient Response/Progress patient is highly engaged and motivated   Treatment Detail new measurements taken with significant improvement noted in all areas of AROM and strength. Reviewed PROM to right fingers, Added gentle stretch to right triceps. Patient would benefit from progressing HEP to include weight.(next visit).   Education   Learner/Method Patient;Family   Readiness Acceptance   Plan   Plan for next session Build new HEP with end range AROM, stretch to elbow and finger extensors and add new strengthening to entire left UE. Begin functional UE ex's(UBE, dowel, folding towels. peg board etc).   Total Session Time   Timed Code Treatment Minutes 40   Total Treatment Time (sum of timed and untimed services) 40

## 2023-11-01 ENCOUNTER — THERAPY VISIT (OUTPATIENT)
Dept: PHYSICAL THERAPY | Facility: REHABILITATION | Age: 53
End: 2023-11-01
Payer: COMMERCIAL

## 2023-11-01 DIAGNOSIS — I61.0 NONTRAUMATIC SUBCORTICAL HEMORRHAGE OF LEFT CEREBRAL HEMISPHERE (H): ICD-10-CM

## 2023-11-01 DIAGNOSIS — R26.9 ABNORMAL GAIT: ICD-10-CM

## 2023-11-01 DIAGNOSIS — M62.81 GENERALIZED MUSCLE WEAKNESS: Primary | ICD-10-CM

## 2023-11-01 PROCEDURE — 97110 THERAPEUTIC EXERCISES: CPT | Mod: GP | Performed by: PHYSICAL THERAPY ASSISTANT

## 2023-11-01 PROCEDURE — 97112 NEUROMUSCULAR REEDUCATION: CPT | Mod: GP | Performed by: PHYSICAL THERAPY ASSISTANT

## 2023-11-03 ENCOUNTER — THERAPY VISIT (OUTPATIENT)
Dept: OCCUPATIONAL THERAPY | Facility: REHABILITATION | Age: 53
End: 2023-11-03
Payer: COMMERCIAL

## 2023-11-03 DIAGNOSIS — Z78.9 DECREASED ACTIVITIES OF DAILY LIVING (ADL): ICD-10-CM

## 2023-11-03 DIAGNOSIS — G81.91 RIGHT HEMIPARESIS (H): Primary | ICD-10-CM

## 2023-11-03 DIAGNOSIS — I61.0 NONTRAUMATIC SUBCORTICAL HEMORRHAGE OF LEFT CEREBRAL HEMISPHERE (H): ICD-10-CM

## 2023-11-03 PROCEDURE — 97110 THERAPEUTIC EXERCISES: CPT | Mod: GO | Performed by: OCCUPATIONAL THERAPIST

## 2023-11-09 ENCOUNTER — THERAPY VISIT (OUTPATIENT)
Dept: PHYSICAL THERAPY | Facility: REHABILITATION | Age: 53
End: 2023-11-09
Payer: COMMERCIAL

## 2023-11-09 DIAGNOSIS — M62.81 GENERALIZED MUSCLE WEAKNESS: Primary | ICD-10-CM

## 2023-11-09 DIAGNOSIS — R26.9 ABNORMAL GAIT: ICD-10-CM

## 2023-11-09 DIAGNOSIS — I61.0 NONTRAUMATIC SUBCORTICAL HEMORRHAGE OF LEFT CEREBRAL HEMISPHERE (H): ICD-10-CM

## 2023-11-09 PROCEDURE — 97112 NEUROMUSCULAR REEDUCATION: CPT | Mod: GP | Performed by: PHYSICAL THERAPY ASSISTANT

## 2023-11-09 PROCEDURE — 97110 THERAPEUTIC EXERCISES: CPT | Mod: GP | Performed by: PHYSICAL THERAPY ASSISTANT

## 2023-11-10 ENCOUNTER — THERAPY VISIT (OUTPATIENT)
Dept: OCCUPATIONAL THERAPY | Facility: REHABILITATION | Age: 53
End: 2023-11-10
Payer: COMMERCIAL

## 2023-11-10 DIAGNOSIS — G81.91 RIGHT HEMIPARESIS (H): Primary | ICD-10-CM

## 2023-11-10 DIAGNOSIS — Z78.9 DECREASED ACTIVITIES OF DAILY LIVING (ADL): ICD-10-CM

## 2023-11-10 DIAGNOSIS — I61.0 NONTRAUMATIC SUBCORTICAL HEMORRHAGE OF LEFT CEREBRAL HEMISPHERE (H): ICD-10-CM

## 2023-11-10 PROCEDURE — 97110 THERAPEUTIC EXERCISES: CPT | Mod: GO | Performed by: OCCUPATIONAL THERAPIST

## 2023-11-13 NOTE — PROGRESS NOTES
"DISCHARGE  Reason for Discharge: Patient has met all goals.    Equipment Issued: NA    Discharge Plan: Continue via home program.  Per last therapy visit, \"Patient verbalized she will continue via home program with strategy use.  Will leave chart open through end of POC, 9/8/23, should patient wish to return for in person session.\"    Referring Provider:  Ara Osman       07/25/23 0500   Appointment Info   Treating Provider Madeline Baltazar M.S. CCC-SLP   Total/Authorized Visits 12   Visits Used 7/12   Medical Diagnosis I61.0 (ICD-10-CM) - Nontraumatic subcortical hemorrhage of left cerebral hemisphere (H)   SLP Tx Diagnosis Oral Dysphagia   Quick Adds Certification   Progress Note/Certification   Start Of Care Date 06/08/23   Onset Of Illness/injury Or Date Of Surgery 04/08/23   Therapy Frequency 1x per week   Predicted Duration 3 months   Certification date from 06/08/23   Certification date to 09/08/23  (8/6/23 for cognitive linguistic POC)   KX Modifier Statement I certify the need for these services furnished under this plan of treatment and while under my care.  (Physician co-signature of this document indicates review and certification of the therapy plan)   Progress Note Due Date 08/06/23       Present Yes   Preferred Language [facility]    ID [id number]   Subjective Report   Subjective Report Patient arrived to therapy in good spirits.  She expressed that  eating and drinking are good  and that she no longer needs her daughter to cut her food into small pieces.  She does not have loss of bolus, but does have ongoing sensation that she has food on her right side of her mouth, when she doesn t.  She expressed that she still has some difficultly finding the words to say, but is able to compensate with strategy use that he daughter will cue her for.   SLP Goals   SLP Goals 2;1;3;4;5;6;7   SLP Goal 1   Goal Identifier Swallow 1   Goal Description Patient will consume snack portion of soft and " bite size foods with timely mastication, efficient mastication, and complete oral clearance.   Rationale To maximize safety, ease and/or independence of oral intake   Goal Progress Goal met per patient report   Target Date 09/08/23   Date Met 07/25/23   SLP Goal 2   Goal Identifier Swallow 2   Goal Description Patient will complete x3 lingual/labial exercises to trial improved strength, coordination, and ROM to support oral phase of swallow.   Rationale To maximize safety, ease and/or independence of oral intake   Goal Progress Goal met   Target Date 09/08/23   Date Met 07/25/23   SLP Goal 3   Goal Identifier Swallow 3   Goal Description Patient will verbalize understanding of intake strategies to increase safety with oral intake by end of POC.   Rationale To maximize safety, ease and/or independence of oral intake   Goal Progress Goal met   Target Date 09/08/23   Date Met 07/25/23   SLP Goal 4   Goal Identifier 4. Word Finding Strategies   Goal Description Goal Description: Mercy will demonstrate use of 2-3 word finding strategies/circumlucation to support verbal expression with mild cues.   Rationale To maximize functional communication within the home or community   Goal Progress Goal met   Target Date 08/06/23   Date Met 07/25/23   SLP Goal 5   Goal Identifier Swallow Evaluation   Goal Description Mercy will complete clinical swallow evaluation to assess swallow function and determine recommended swallow strategies and exercises.   Goal Progress Goal met   Target Date 08/06/23   Date Met 06/08/23   SLP Goal 6   Goal Identifier 6. Verbal Expression Tasks   Goal Description Mercy will complete clinical swallow evaluation to assess swallow function and determine recommended swallow strategies and exercises.   Rationale To maximize functional communication within the home or community   Goal Progress Goal met   Target Date 08/06/23   SLP Goal 7   Goal Identifier 7. Home Program   Goal Description Mercy will verbalize  understanding of a home programming recommendations each session to facilitate carryover from therapy session.   Rationale To maximize functional communication within the home or community   Goal Progress Goal met   Target Date 23   Date Met 23   Treatment Interventions (SLP)   Treatment Interventions Treatment Swallow/Oral dysfunction;Treatment Speech/Lang/Voice   Treatment Swallow/Oral dysfunction   Treatment of Swallowing Dysfunction &/or Oral Function for Feeding Minutes (35526) 20 Minutes   Swallow/Oral Dysfunction 1 - Details Patient expressed improved bolus control, manipulation, oral clearance, and decreased loss of bolus since initiating therapy.  She continues to completed exercises provided.  Button Pull: Medium Large Button, x10 in middle, right, and left.  Medium button- x10 in the middle, x10 on te right, and x8-9 on the left, improved from last session.  Bolus manipulation: Patient able to manipulate sucker bilaterally x26 in 1 minute, one greater than initial evaluation.  Given improvement with tasks and functional intake, no further exercises required, may continue if she would like.   Skilled Intervention Other  (oral motor exercises)   Patient Response/Progress Patient progressed since onset of exercise program with structured tasks and functional intake.   Treatment Speech/Lang/Voice   Treatment of Speech, Language, Voice Communication&/or Auditory Processing (76800) 20 Minutes   Speech/Lang/Voice 1 - Details Patient expressed that she is able to compensate for word finding difficulties with strategy use; however, at times still requires prompts/cues from her daughter to use them.  Confrontation Namin% on today s date to either name or describe in instances where there was not a specific name in ong.   Skilled Intervention Provided written and verbal information on.;Modeled compensatory strategies;Provided feedback on performance of tasks   Patient Response/Progress High  accuracy with confrontation naming on today's date.   Education   Learner/Method Patient;Family   Education Comments word finding strategies   Plan   Home program word finding strategies   Updates to plan of care Patient verbalized she will continue via home program with strategy use.  Will leave chart open through end of POC, 9/8/23, should patient wish to return for in person session.   Total Session Time   Total Treatment Time (sum of timed and untimed services) 40

## 2023-11-16 ENCOUNTER — THERAPY VISIT (OUTPATIENT)
Dept: PHYSICAL THERAPY | Facility: REHABILITATION | Age: 53
End: 2023-11-16
Payer: COMMERCIAL

## 2023-11-16 DIAGNOSIS — R26.9 ABNORMAL GAIT: ICD-10-CM

## 2023-11-16 DIAGNOSIS — I61.0 NONTRAUMATIC SUBCORTICAL HEMORRHAGE OF LEFT CEREBRAL HEMISPHERE (H): ICD-10-CM

## 2023-11-16 DIAGNOSIS — M62.81 GENERALIZED MUSCLE WEAKNESS: Primary | ICD-10-CM

## 2023-11-16 PROCEDURE — 97112 NEUROMUSCULAR REEDUCATION: CPT | Mod: GP | Performed by: PHYSICAL THERAPY ASSISTANT

## 2023-11-16 PROCEDURE — 97110 THERAPEUTIC EXERCISES: CPT | Mod: GP | Performed by: PHYSICAL THERAPY ASSISTANT

## 2023-11-17 ENCOUNTER — THERAPY VISIT (OUTPATIENT)
Dept: OCCUPATIONAL THERAPY | Facility: REHABILITATION | Age: 53
End: 2023-11-17
Payer: COMMERCIAL

## 2023-11-17 DIAGNOSIS — G81.91 RIGHT HEMIPARESIS (H): Primary | ICD-10-CM

## 2023-11-17 DIAGNOSIS — Z78.9 DECREASED ACTIVITIES OF DAILY LIVING (ADL): ICD-10-CM

## 2023-11-17 DIAGNOSIS — I61.0 NONTRAUMATIC SUBCORTICAL HEMORRHAGE OF LEFT CEREBRAL HEMISPHERE (H): ICD-10-CM

## 2023-11-17 PROCEDURE — 97110 THERAPEUTIC EXERCISES: CPT | Mod: GO | Performed by: OCCUPATIONAL THERAPIST

## 2023-11-24 ENCOUNTER — THERAPY VISIT (OUTPATIENT)
Dept: OCCUPATIONAL THERAPY | Facility: REHABILITATION | Age: 53
End: 2023-11-24
Payer: COMMERCIAL

## 2023-11-24 DIAGNOSIS — Z78.9 DECREASED ACTIVITIES OF DAILY LIVING (ADL): ICD-10-CM

## 2023-11-24 DIAGNOSIS — G81.91 RIGHT HEMIPARESIS (H): Primary | ICD-10-CM

## 2023-11-24 DIAGNOSIS — I61.0 NONTRAUMATIC SUBCORTICAL HEMORRHAGE OF LEFT CEREBRAL HEMISPHERE (H): ICD-10-CM

## 2023-11-24 PROCEDURE — 97110 THERAPEUTIC EXERCISES: CPT | Mod: GO | Performed by: OCCUPATIONAL THERAPIST

## 2023-11-26 PROBLEM — E78.5 HYPERLIPIDEMIA: Status: ACTIVE | Noted: 2023-05-12

## 2023-11-26 PROBLEM — G51.0 LEFT-SIDED BELL'S PALSY: Status: ACTIVE | Noted: 2018-04-18

## 2023-11-26 PROBLEM — I10 ESSENTIAL HYPERTENSION: Status: ACTIVE | Noted: 2018-04-18

## 2023-11-26 PROBLEM — R73.03 PREDIABETES: Status: ACTIVE | Noted: 2023-05-04

## 2023-11-26 PROBLEM — E55.9 VITAMIN D DEFICIENCY: Status: ACTIVE | Noted: 2023-07-12

## 2023-11-26 NOTE — PROGRESS NOTES
NEUROLOGY FOLLOW UP VISIT  NOTE       [facility] NEUROLOGY [place]  [address] [place] [address]  Tel: [phone]  Fax: [phone]  [email or web address]     Mercy Osman DOB 1970, MRN [id number]  PCP: Ara Osman  Date: 2023      ASSESSMENT & PLAN     Visit Diagnosis  Nontraumatic subcortical hemorrhage of left cerebral hemisphere (H)     Left thalamic hemorrhage  53-year-old female with history of HTN, HLD, prediabetes who was admitted to the hospital with thalamic hypertensive hemorrhage.  She continues to be weak on the right side and I have recommended:    1.  Continue home exercises  2.  Keep blood pressure 120-130/80-90  3.  Follow-up on a as needed basis    Thank you again for this referral, please feel free to contact me if you have any questions.    Isidoro Daniels MD  [facility]  (Formerly, [facility]     HISTORY OF PRESENT ILLNESS     Patient is a 53-year-old female with history of HTN, HLD, prediabetes, left Bell's palsy who was admitted to [facility] in 2023 with left thalamic intracranial hemorrhage.  He noticed sudden onset of right-sided weakness and facial droop.  In the ER she was found to have left thalamic hemorrhage.  Her blood pressure was significantly elevated and she was started on nicardipine.  Her symptoms were managed conservatively and was eventually transferred to rehab.  Since her discharge she was seen by the nurse practitioner and was told to continue with PT, OT and speech therapy with better control of prediabetes and hypertension.  Continues to have right-sided weakness and is unable to ambulate without cane.  She completed physical therapy and is trying exercises on her own at home     PROBLEM LIST   Patient Active Problem List   Diagnosis Code    H/O Let thalamic hypertensive hemorrhage I61.9    Oral phase dysphagia R13.11    Left-sided Bell's palsy G51.0    Essential hypertension  I10    Hyperlipidemia E78.5    Prediabetes R73.03    Vitamin D deficiency E55.9         PAST MEDICAL & SURGICAL HISTORY     Past Medical History:   Patient  has no past medical history on file.    Surgical History:  She  has no past surgical history on file.     SOCIAL HISTORY     Reviewed, and she  reports that she has never smoked. She has never used smokeless tobacco.     FAMILY HISTORY     Reviewed, and family history is not on file.     ALLERGIES     No Known Allergies      REVIEW OF SYSTEMS     A 12 point review of system was performed and was negative except as outlined in the history of present illness.     HOME MEDICATIONS     Current Outpatient Rx   Medication Sig Dispense Refill    acetaminophen (TYLENOL) 325 MG tablet Take 2 tablets (650 mg) by mouth every 4 hours as needed for mild pain or fever      amLODIPine (NORVASC) 5 MG tablet Take 1 tablet (5 mg) by mouth daily 30 tablet 0    lisinopril (ZESTRIL) 20 MG tablet Take 1 tablet (20 mg) by mouth daily 30 tablet 0    senna-docusate (SENOKOT-S/PERICOLACE) 8.6-50 MG tablet Take 1-2 tablets by mouth nightly as needed for constipation 30 tablet 0    atorvastatin (LIPITOR) 20 MG tablet Take 1 tablet (20 mg) by mouth every evening (Patient not taking: Reported on 12/5/2023) 30 tablet 0         PHYSICAL EXAM     Vital signs  BP (!) 139/93   Pulse 67   Wt 49 kg (108 lb)   BMI 22.57 kg/m      Weight:   108 lbs 0 oz    Middle-age female who is alert and oriented vital signs are reviewed and documented in electronic medical record.  Neck supple.  Neurologically speech was normal.  Cranial nerves II through XII are intact motor strength on the right 4/5 on the left 5/5 reflexes 3+ on the right 2+ on the left upgoing toe on the right she walks with a cane and has a right hemiparetic gait     PERTINENT DIAGNOSTIC STUDIES     Following studies were reviewed:     CT BRAIN 4/11/2023  Unchanged intraparenchymal hemorrhage in the left thalamus with  minimal mass effect  and midline shift. No new acute intracranial  findings.    CTA HEAD AND NECK 4/8/2023  HEAD CT:  1. 2.7 x 1.4 x 2.5 cm acute hemorrhage in the left thalamus/left internal capsule with involvement of the left basal ganglia. No intraventricular extension. This is typical of a hypertensive bleed.  2. Slight left to right bowing of the midline structures.      HEAD CTA: No stenosis/occlusion, aneurysm, or high flow vascular malformation.     NECK CTA:  No measurable stenosis or dissection.       PERTINENT LABS  Following labs were reviewed:  No visits with results within 3 Month(s) from this visit.   Latest known visit with results is:   Admission on 04/14/2023, Discharged on 05/05/2023   Component Date Value Ref Range Status    Sodium 04/17/2023 139  136 - 145 mmol/L Final    Potassium 04/17/2023 3.5  3.4 - 5.3 mmol/L Final    Chloride 04/17/2023 104  98 - 107 mmol/L Final    Carbon Dioxide (CO2) 04/17/2023 23  22 - 29 mmol/L Final    Anion Gap 04/17/2023 12  7 - 15 mmol/L Final    Urea Nitrogen 04/17/2023 12.4  6.0 - 20.0 mg/dL Final    Creatinine 04/17/2023 0.71  0.51 - 0.95 mg/dL Final    Calcium 04/17/2023 10.0  8.6 - 10.0 mg/dL Final    Glucose 04/17/2023 182 (H)  70 - 99 mg/dL Final    GFR Estimate 04/17/2023 >90  >60 mL/min/1.73m2 Final    WBC Count 04/17/2023 8.1  4.0 - 11.0 10e3/uL Final    RBC Count 04/17/2023 5.12  3.80 - 5.20 10e6/uL Final    Hemoglobin 04/17/2023 12.9  11.7 - 15.7 g/dL Final    Hematocrit 04/17/2023 42.6  35.0 - 47.0 % Final    MCV 04/17/2023 83  78 - 100 fL Final    MCH 04/17/2023 25.2 (L)  26.5 - 33.0 pg Final    MCHC 04/17/2023 30.3 (L)  31.5 - 36.5 g/dL Final    RDW 04/17/2023 16.5 (H)  10.0 - 15.0 % Final    Platelet Count 04/17/2023 388  150 - 450 10e3/uL Final    Lactic Acid 04/22/2023 1.7  0.7 - 2.0 mmol/L Final    Sodium 04/24/2023 140  136 - 145 mmol/L Final    Potassium 04/24/2023 4.7  3.4 - 5.3 mmol/L Final    Chloride 04/24/2023 102  98 - 107 mmol/L Final    Carbon Dioxide  (CO2) 04/24/2023 26  22 - 29 mmol/L Final    Anion Gap 04/24/2023 12  7 - 15 mmol/L Final    Urea Nitrogen 04/24/2023 12.0  6.0 - 20.0 mg/dL Final    Creatinine 04/24/2023 0.69  0.51 - 0.95 mg/dL Final    Calcium 04/24/2023 10.2 (H)  8.6 - 10.0 mg/dL Final    Glucose 04/24/2023 98  70 - 99 mg/dL Final    GFR Estimate 04/24/2023 >90  >60 mL/min/1.73m2 Final    WBC Count 04/24/2023 10.1  4.0 - 11.0 10e3/uL Final    RBC Count 04/24/2023 4.79  3.80 - 5.20 10e6/uL Final    Hemoglobin 04/24/2023 12.4  11.7 - 15.7 g/dL Final    Hematocrit 04/24/2023 40.2  35.0 - 47.0 % Final    MCV 04/24/2023 84  78 - 100 fL Final    MCH 04/24/2023 25.9 (L)  26.5 - 33.0 pg Final    MCHC 04/24/2023 30.8 (L)  31.5 - 36.5 g/dL Final    RDW 04/24/2023 16.2 (H)  10.0 - 15.0 % Final    Platelet Count 04/24/2023 355  150 - 450 10e3/uL Final    Lactic Acid 04/30/2023 2.1 (H)  0.7 - 2.0 mmol/L Final    Lactic Acid 04/30/2023 2.1 (H)  0.7 - 2.0 mmol/L Final    Sodium 05/01/2023 141  136 - 145 mmol/L Final    Potassium 05/01/2023 4.1  3.4 - 5.3 mmol/L Final    Chloride 05/01/2023 103  98 - 107 mmol/L Final    Carbon Dioxide (CO2) 05/01/2023 25  22 - 29 mmol/L Final    Anion Gap 05/01/2023 13  7 - 15 mmol/L Final    Urea Nitrogen 05/01/2023 13.1  6.0 - 20.0 mg/dL Final    Creatinine 05/01/2023 0.70  0.51 - 0.95 mg/dL Final    Calcium 05/01/2023 10.4 (H)  8.6 - 10.0 mg/dL Final    Glucose 05/01/2023 107 (H)  70 - 99 mg/dL Final    GFR Estimate 05/01/2023 >90  >60 mL/min/1.73m2 Final    WBC Count 05/01/2023 9.2  4.0 - 11.0 10e3/uL Final    RBC Count 05/01/2023 4.72  3.80 - 5.20 10e6/uL Final    Hemoglobin 05/01/2023 12.3  11.7 - 15.7 g/dL Final    Hematocrit 05/01/2023 39.8  35.0 - 47.0 % Final    MCV 05/01/2023 84  78 - 100 fL Final    MCH 05/01/2023 26.1 (L)  26.5 - 33.0 pg Final    MCHC 05/01/2023 30.9 (L)  31.5 - 36.5 g/dL Final    RDW 05/01/2023 15.9 (H)  10.0 - 15.0 % Final    Platelet Count 05/01/2023 377  150 - 450 10e3/uL Final         Total  time spent for face to face visit, reviewing labs/imaging studies, counseling and coordination of care was: 45 Minutes spent on the date of the encounter doing chart review, review of outside records, review of test results, interpretation of tests, patient visit, and documentation     This note was dictated using voice recognition software.  Any grammatical or context distortions are unintentional and inherent to the software.    No orders of the defined types were placed in this encounter.     New Prescriptions    No medications on file     Modified Medications    No medications on file

## 2023-12-05 ENCOUNTER — OFFICE VISIT (OUTPATIENT)
Dept: NEUROLOGY | Facility: CLINIC | Age: 53
End: 2023-12-05
Payer: COMMERCIAL

## 2023-12-05 VITALS
HEART RATE: 67 BPM | WEIGHT: 108 LBS | DIASTOLIC BLOOD PRESSURE: 93 MMHG | BODY MASS INDEX: 22.57 KG/M2 | SYSTOLIC BLOOD PRESSURE: 139 MMHG

## 2023-12-05 DIAGNOSIS — I61.0 NONTRAUMATIC SUBCORTICAL HEMORRHAGE OF LEFT CEREBRAL HEMISPHERE (H): Primary | ICD-10-CM

## 2023-12-05 PROCEDURE — 99215 OFFICE O/P EST HI 40 MIN: CPT | Performed by: PSYCHIATRY & NEUROLOGY

## 2023-12-05 NOTE — NURSING NOTE
"Mercy Osman is a 53 year old female who presents for:  Chief Complaint   Patient presents with    Stroke     No concerns.  Still taking medications as prescribed.   Completed Therapies         Initial Vitals:  BP (!) 139/93   Pulse 67   Wt 49 kg (108 lb)   BMI 22.57 kg/m   Estimated body mass index is 22.57 kg/m  as calculated from the following:    Height as of 4/14/23: 1.473 m (4' 10\").    Weight as of this encounter: 49 kg (108 lb).. Body surface area is 1.42 meters squared. BP completed using cuff size: wrist cuff    Juliocesar KM Richard  "

## 2023-12-05 NOTE — LETTER
2023         RE: Mercy Osman  [address] [id number]        Dear Colleague,    Thank you for referring your patient, Mercy Osman, to the [facility] NEUROLOG[facility] CLINIC [place]. Please see a copy of my visit note below.    NEUROLOGY FOLLOW UP VISIT  NOTE       [facility] NEUROLOGY [place]  [address] [place] [id number]  Tel: [phone]  Fax: [phone]  [email or web address]     Mercy OsmanMORTEZA 1970, MRN [id number]  PCP: Ara Osman  Date: 2023      ASSESSMENT & PLAN     Visit Diagnosis  Nontraumatic subcortical hemorrhage of left cerebral hemisphere (H)     Left thalamic hemorrhage  53-year-old female with history of HTN, HLD, prediabetes who was admitted to the hospital with thalamic hypertensive hemorrhage.  She continues to be weak on the right side and I have recommended:    1.  Continue home exercises  2.  Keep blood pressure 120-130/80-90  3.  Follow-up on a as needed basis    Thank you again for this referral, please feel free to contact me if you have any questions.    Isidoro Daniels MD  [facility]  ([facility], [facility]     HISTORY OF PRESENT ILLNESS     Patient is a 53-year-old female with history of HTN, HLD, prediabetes, left Bell's palsy who was admitted to [facility] in 2023 with left thalamic intracranial hemorrhage.  He noticed sudden onset of right-sided weakness and facial droop.  In the ER she was found to have left thalamic hemorrhage.  Her blood pressure was significantly elevated and she was started on nicardipine.  Her symptoms were managed conservatively and was eventually transferred to rehab.  Since her discharge she was seen by the nurse practitioner and was told to continue with PT, OT and speech therapy with better control of prediabetes and hypertension.  Continues to have right-sided weakness and is unable to ambulate without cane.  She completed physical  therapy and is trying exercises on her own at home     PROBLEM LIST   Patient Active Problem List   Diagnosis Code     H/O Let thalamic hypertensive hemorrhage I61.9     Oral phase dysphagia R13.11     Left-sided Bell's palsy G51.0     Essential hypertension I10     Hyperlipidemia E78.5     Prediabetes R73.03     Vitamin D deficiency E55.9         PAST MEDICAL & SURGICAL HISTORY     Past Medical History:   Patient  has no past medical history on file.    Surgical History:  She  has no past surgical history on file.     SOCIAL HISTORY     Reviewed, and she  reports that she has never smoked. She has never used smokeless tobacco.     FAMILY HISTORY     Reviewed, and family history is not on file.     ALLERGIES     No Known Allergies      REVIEW OF SYSTEMS     A 12 point review of system was performed and was negative except as outlined in the history of present illness.     HOME MEDICATIONS     Current Outpatient Rx   Medication Sig Dispense Refill     acetaminophen (TYLENOL) 325 MG tablet Take 2 tablets (650 mg) by mouth every 4 hours as needed for mild pain or fever       amLODIPine (NORVASC) 5 MG tablet Take 1 tablet (5 mg) by mouth daily 30 tablet 0     lisinopril (ZESTRIL) 20 MG tablet Take 1 tablet (20 mg) by mouth daily 30 tablet 0     senna-docusate (SENOKOT-S/PERICOLACE) 8.6-50 MG tablet Take 1-2 tablets by mouth nightly as needed for constipation 30 tablet 0     atorvastatin (LIPITOR) 20 MG tablet Take 1 tablet (20 mg) by mouth every evening (Patient not taking: Reported on 12/5/2023) 30 tablet 0         PHYSICAL EXAM     Vital signs  BP (!) 139/93   Pulse 67   Wt 49 kg (108 lb)   BMI 22.57 kg/m      Weight:   108 lbs 0 oz    Middle-age female who is alert and oriented vital signs are reviewed and documented in electronic medical record.  Neck supple.  Neurologically speech was normal.  Cranial nerves II through XII are intact motor strength on the right 4/5 on the left 5/5 reflexes 3+ on the right 2+ on  the left upgoing toe on the right she walks with a cane and has a right hemiparetic gait     PERTINENT DIAGNOSTIC STUDIES     Following studies were reviewed:     CT BRAIN 4/11/2023  Unchanged intraparenchymal hemorrhage in the left thalamus with  minimal mass effect and midline shift. No new acute intracranial  findings.    CTA HEAD AND NECK 4/8/2023  HEAD CT:  1. 2.7 x 1.4 x 2.5 cm acute hemorrhage in the left thalamus/left internal capsule with involvement of the left basal ganglia. No intraventricular extension. This is typical of a hypertensive bleed.  2. Slight left to right bowing of the midline structures.      HEAD CTA: No stenosis/occlusion, aneurysm, or high flow vascular malformation.     NECK CTA:  No measurable stenosis or dissection.       PERTINENT LABS  Following labs were reviewed:  No visits with results within 3 Month(s) from this visit.   Latest known visit with results is:   Admission on 04/14/2023, Discharged on 05/05/2023   Component Date Value Ref Range Status     Sodium 04/17/2023 139  136 - 145 mmol/L Final     Potassium 04/17/2023 3.5  3.4 - 5.3 mmol/L Final     Chloride 04/17/2023 104  98 - 107 mmol/L Final     Carbon Dioxide (CO2) 04/17/2023 23  22 - 29 mmol/L Final     Anion Gap 04/17/2023 12  7 - 15 mmol/L Final     Urea Nitrogen 04/17/2023 12.4  6.0 - 20.0 mg/dL Final     Creatinine 04/17/2023 0.71  0.51 - 0.95 mg/dL Final     Calcium 04/17/2023 10.0  8.6 - 10.0 mg/dL Final     Glucose 04/17/2023 182 (H)  70 - 99 mg/dL Final     GFR Estimate 04/17/2023 >90  >60 mL/min/1.73m2 Final     WBC Count 04/17/2023 8.1  4.0 - 11.0 10e3/uL Final     RBC Count 04/17/2023 5.12  3.80 - 5.20 10e6/uL Final     Hemoglobin 04/17/2023 12.9  11.7 - 15.7 g/dL Final     Hematocrit 04/17/2023 42.6  35.0 - 47.0 % Final     MCV 04/17/2023 83  78 - 100 fL Final     MCH 04/17/2023 25.2 (L)  26.5 - 33.0 pg Final     MCHC 04/17/2023 30.3 (L)  31.5 - 36.5 g/dL Final     RDW 04/17/2023 16.5 (H)  10.0 - 15.0 %  Final     Platelet Count 04/17/2023 388  150 - 450 10e3/uL Final     Lactic Acid 04/22/2023 1.7  0.7 - 2.0 mmol/L Final     Sodium 04/24/2023 140  136 - 145 mmol/L Final     Potassium 04/24/2023 4.7  3.4 - 5.3 mmol/L Final     Chloride 04/24/2023 102  98 - 107 mmol/L Final     Carbon Dioxide (CO2) 04/24/2023 26  22 - 29 mmol/L Final     Anion Gap 04/24/2023 12  7 - 15 mmol/L Final     Urea Nitrogen 04/24/2023 12.0  6.0 - 20.0 mg/dL Final     Creatinine 04/24/2023 0.69  0.51 - 0.95 mg/dL Final     Calcium 04/24/2023 10.2 (H)  8.6 - 10.0 mg/dL Final     Glucose 04/24/2023 98  70 - 99 mg/dL Final     GFR Estimate 04/24/2023 >90  >60 mL/min/1.73m2 Final     WBC Count 04/24/2023 10.1  4.0 - 11.0 10e3/uL Final     RBC Count 04/24/2023 4.79  3.80 - 5.20 10e6/uL Final     Hemoglobin 04/24/2023 12.4  11.7 - 15.7 g/dL Final     Hematocrit 04/24/2023 40.2  35.0 - 47.0 % Final     MCV 04/24/2023 84  78 - 100 fL Final     MCH 04/24/2023 25.9 (L)  26.5 - 33.0 pg Final     MCHC 04/24/2023 30.8 (L)  31.5 - 36.5 g/dL Final     RDW 04/24/2023 16.2 (H)  10.0 - 15.0 % Final     Platelet Count 04/24/2023 355  150 - 450 10e3/uL Final     Lactic Acid 04/30/2023 2.1 (H)  0.7 - 2.0 mmol/L Final     Lactic Acid 04/30/2023 2.1 (H)  0.7 - 2.0 mmol/L Final     Sodium 05/01/2023 141  136 - 145 mmol/L Final     Potassium 05/01/2023 4.1  3.4 - 5.3 mmol/L Final     Chloride 05/01/2023 103  98 - 107 mmol/L Final     Carbon Dioxide (CO2) 05/01/2023 25  22 - 29 mmol/L Final     Anion Gap 05/01/2023 13  7 - 15 mmol/L Final     Urea Nitrogen 05/01/2023 13.1  6.0 - 20.0 mg/dL Final     Creatinine 05/01/2023 0.70  0.51 - 0.95 mg/dL Final     Calcium 05/01/2023 10.4 (H)  8.6 - 10.0 mg/dL Final     Glucose 05/01/2023 107 (H)  70 - 99 mg/dL Final     GFR Estimate 05/01/2023 >90  >60 mL/min/1.73m2 Final     WBC Count 05/01/2023 9.2  4.0 - 11.0 10e3/uL Final     RBC Count 05/01/2023 4.72  3.80 - 5.20 10e6/uL Final     Hemoglobin 05/01/2023 12.3  11.7 - 15.7  g/dL Final     Hematocrit 05/01/2023 39.8  35.0 - 47.0 % Final     MCV 05/01/2023 84  78 - 100 fL Final     MCH 05/01/2023 26.1 (L)  26.5 - 33.0 pg Final     MCHC 05/01/2023 30.9 (L)  31.5 - 36.5 g/dL Final     RDW 05/01/2023 15.9 (H)  10.0 - 15.0 % Final     Platelet Count 05/01/2023 377  150 - 450 10e3/uL Final         Total time spent for face to face visit, reviewing labs/imaging studies, counseling and coordination of care was: 45 Minutes spent on the date of the encounter doing chart review, review of outside records, review of test results, interpretation of tests, patient visit, and documentation     This note was dictated using voice recognition software.  Any grammatical or context distortions are unintentional and inherent to the software.    No orders of the defined types were placed in this encounter.     New Prescriptions    No medications on file     Modified Medications    No medications on file                 Again, thank you for allowing me to participate in the care of your patient.        Sincerely,        Isidoro Daniels MD

## 2023-12-31 NOTE — PROGRESS NOTES
DISCHARGE  Reason for Discharge: Patient has met all goals.    Equipment Issued: none    Discharge Plan: Patient to continue home program.    Referring Provider:  Ara Osman MD       11/24/23 0500   Appointment Info   Treating Provider Carline Ceron, OTR/L   Visits Used 16   Medical Diagnosis Nontraumatic subcortical hemorrhage of left cerebral hemisphere (H) (I61.0)   OT Tx Diagnosis right UE weakness, numbness, decreased ADL function, cognitive changes   Progress Note/Certification   Start Of Care Date 05/10/23   Onset of Illness/Injury or Date of Surgery 04/07/23   Therapy Frequency once a week   Predicted Duration 12 weeks   Certification date from 10/25/23   Certification date to 01/17/24   KX Modifier Statement I certify the need for these services furnished under this plan of treatment and while under my care.  (Physician co-signature of this document indicates review and certification of the therapy plan)   Progress Note Due Date 01/17/24   Progress Note Completed Date 10/27/23   Recertification Due 01/17/24       Present Yes    ID LLS-ID [id number]   [place] [facility] [place] Goal 1   Goal Description Patient to increase R shoulder flexion and abduction AROM by 10 degrees for improved R UE function for ADL/IADLs (during dressing and grooming tasks, etc.).   Goal Progress met-continue goal   Target Date 01/17/24   Date Met 11/24/23   OT Goal 2   Goal Description Patient to demonstrate a R UE HEP(including NMES) with SBA and min cues for good follow through at home and increase functional strength and ROM for maximum independence with performance of ADLs.   Target Date 01/17/24   Date Met 11/24/23   OT Goal 3   Goal Description Patient to demonstrate functional tasks (feeding self, wiping the counter/table, washing dishes, etc.) with 30% use of R UE as gross stabilizer or gross assist for increased ADL/IADL independence   Target Date 01/17/24   Date Met 11/24/23   OT Goal  4   Goal Description Patient to demonstrate improved R   strength to 5# for increased ADL independence (hanging onto objects for hygiene, feeding self, completing B UE tasks, etc.).   Target Date 01/17/24   Date Met 11/24/23   OT Goal 5   Goal Description Patient will demonstrate 3+/5 strength R elbow for increased independence with ADL tasks.   Target Date 10/25/23   Date Met 08/30/23   Objective Measure 1   Objective Measure Right POG   Details 18#   Objective Measure 2   Objective Measure Right shoulder flexion/extension-AROM/strength   Details 140/WNL, 3+/4-   Objective Measure 3   Objective Measure Right abduction/adduction-AROM/strength   Details 126/WNL, 3+/4-   Objective Measure 4   Objective Measure Right elbow extension/flexion-AROM/strength   Details WNL/WNL, 3+/3+   Objective Measure 5   Objective Measure Right pronation/supination-AROM   Details WNL/WNL, 3+/3+   Objective Measure 6   Objective Measure Right wrist flexion/extension-AROM/strength   Details WNL/WNL, 4-/4-   Objective Measure 7   Objective Measure Right 3 point pinch/lateral pinch   Details 7#/6#   Objective Measure 9   Objective Measure 9 HPT   Details 105.81 seconds   Therapeutic Procedure/Exercise   Therapeutic Procedure: strength, endurance, ROM, flexibillity minutes (16608) 40   Skilled Intervention Right UE AAROM, AROM and strengthening   Patient Response/Progress patient is highly engaged and motivated   Treatment Detail new measurements taken to day with improvements in all areas. Patient able to complete 9 HPT with right hand today in under 3 minutes(unable at all in the past). Patient worked on fm exercises today. Reviewed HEP with emphasis on thoroughness with stretch to right finger extensors(MCP and PIP flexion).   Education   Learner/Method Patient;Family   Readiness Acceptance   Plan   Plan for next session Patient has met goals and will continue HEP on her own. Recommend re-evaluation in the 3-6 months.   Total Session  Time   Timed Code Treatment Minutes 40   Total Treatment Time (sum of timed and untimed services) 40

## 2024-07-27 ENCOUNTER — HEALTH MAINTENANCE LETTER (OUTPATIENT)
Age: 54
End: 2024-07-27

## 2024-10-23 ENCOUNTER — LAB (OUTPATIENT)
Dept: LAB | Facility: HOSPITAL | Age: 54
End: 2024-10-23
Payer: COMMERCIAL

## 2024-10-23 DIAGNOSIS — D64.9 ANEMIA, UNSPECIFIED: Primary | ICD-10-CM

## 2024-10-23 LAB
ERYTHROCYTE [DISTWIDTH] IN BLOOD BY AUTOMATED COUNT: 19.2 % (ref 10–15)
FERRITIN SERPL-MCNC: 10 NG/ML (ref 11–328)
FOLATE SERPL-MCNC: 10.3 NG/ML (ref 4.6–34.8)
HCT VFR BLD AUTO: 29.4 % (ref 35–47)
HGB BLD-MCNC: 8.4 G/DL (ref 11.7–15.7)
IRON BINDING CAPACITY (ROCHE): 342 UG/DL (ref 240–430)
IRON SATN MFR SERPL: 10 % (ref 15–46)
IRON SERPL-MCNC: 34 UG/DL (ref 37–145)
MCH RBC QN AUTO: 23.3 PG (ref 26.5–33)
MCHC RBC AUTO-ENTMCNC: 28.6 G/DL (ref 31.5–36.5)
MCV RBC AUTO: 82 FL (ref 78–100)
PLATELET # BLD AUTO: 358 10E3/UL (ref 150–450)
RBC # BLD AUTO: 3.6 10E6/UL (ref 3.8–5.2)
VIT B12 SERPL-MCNC: 769 PG/ML (ref 232–1245)
WBC # BLD AUTO: 7.8 10E3/UL (ref 4–11)

## 2024-10-23 PROCEDURE — 82728 ASSAY OF FERRITIN: CPT

## 2024-10-23 PROCEDURE — 82746 ASSAY OF FOLIC ACID SERUM: CPT

## 2024-10-23 PROCEDURE — 36415 COLL VENOUS BLD VENIPUNCTURE: CPT

## 2024-10-23 PROCEDURE — 82607 VITAMIN B-12: CPT

## 2024-10-23 PROCEDURE — 85027 COMPLETE CBC AUTOMATED: CPT

## 2024-10-23 PROCEDURE — 83550 IRON BINDING TEST: CPT

## 2024-12-05 ENCOUNTER — TELEPHONE (OUTPATIENT)
Dept: PHYSICAL MEDICINE AND REHAB | Facility: CLINIC | Age: 54
End: 2024-12-05
Payer: COMMERCIAL

## 2024-12-05 NOTE — TELEPHONE ENCOUNTER
[facility] Call Center    Phone Message    May a detailed message be left on voicemail: yes     Reason for Call: Other: Patients son called wanting to speak to the provider. Please call back [phone].     Action Taken: Message routed to:  [facility]    Travel Screening: Not Applicable     Date of Service:

## 2024-12-17 ENCOUNTER — TELEPHONE (OUTPATIENT)
Dept: PHYSICAL MEDICINE AND REHAB | Facility: CLINIC | Age: 54
End: 2024-12-17
Payer: COMMERCIAL

## 2024-12-17 NOTE — TELEPHONE ENCOUNTER
Writer called patient , but was unable to leave voice message as mailbox has not been set up. Susan Patel RN on 12/17/2024 at 1:08 PM

## 2024-12-17 NOTE — TELEPHONE ENCOUNTER
[facility] Call Center    Phone Message    May a detailed message be left on voicemail: yes     Reason for Call: Other: Patient's son Marleny called requesting to discuss a work letter for patient.No consent to communicate on file, patient's son is confused about this, stating he spoke with someone earlier and that patient was in the hospital within the last year and they are concerned why no one tried to obtain this for him to be able to call in. He states Dr. Diaz knows what he is talking about and he is requesting to speak to provider directly at [phone].       Action Taken: Message routed to:  [facility]    Travel Screening: Not Applicable     Date of Service:

## 2024-12-17 NOTE — TELEPHONE ENCOUNTER
"Writer called patient's son and he requested to speak directly to Dr. Diaz regarding a work note needed. Writer tried to obtain additional information, but patient's son states he would only like to speak to Dr. Diaz, \"Dr. Diaz will know what I am talking about. \"  Writer will route message to Dr. Diaz. Susan Patel RN on 12/17/2024 at 5:03 PM    " Lab Facility: [id number] Lab Facility: [id number]

## 2024-12-17 NOTE — TELEPHONE ENCOUNTER
----- Message from Adryan Zambrano sent at 12/17/2024  8:43 AM CST -----  Family called [facility], wants to speak with Dr. Zimmerman about work letter can someone please notify/reach out to family.

## 2024-12-17 NOTE — TELEPHONE ENCOUNTER
Writer attempted to call patient via cell phone number listed, but call would not go through. Writer will try home telephone number. Susan Patel RN on 12/17/2024 at 1:07 PM

## 2024-12-18 NOTE — TELEPHONE ENCOUNTER
Mailbox is full, unable to leave message.  I have note from Dr. ROMAN.  Need to know how Marleny would like to receive note.     Hafsa Hill MA

## 2025-01-07 NOTE — TELEPHONE ENCOUNTER
Mailbox is still full.  Going to complete this message.  I will hold onto the note if he calls back.     Hafsa Hill MA

## 2025-06-08 ENCOUNTER — HEALTH MAINTENANCE LETTER (OUTPATIENT)
Age: 55
End: 2025-06-08

## 2025-08-10 ENCOUNTER — HEALTH MAINTENANCE LETTER (OUTPATIENT)
Age: 55
End: 2025-08-10